# Patient Record
Sex: FEMALE | Race: BLACK OR AFRICAN AMERICAN | NOT HISPANIC OR LATINO | Employment: FULL TIME | URBAN - METROPOLITAN AREA
[De-identification: names, ages, dates, MRNs, and addresses within clinical notes are randomized per-mention and may not be internally consistent; named-entity substitution may affect disease eponyms.]

---

## 2017-01-09 RX ORDER — METFORMIN HYDROCHLORIDE 1000 MG/1
TABLET ORAL
Qty: 180 TABLET | Refills: 0 | Status: SHIPPED | OUTPATIENT
Start: 2017-01-09 | End: 2017-06-12 | Stop reason: SDUPTHER

## 2017-01-09 NOTE — TELEPHONE ENCOUNTER
Patient scheduled appt for lab and OV. Patient requesting to have CBC and CMP as well. Please advise

## 2017-02-02 ENCOUNTER — OFFICE VISIT (OUTPATIENT)
Dept: FAMILY MEDICINE | Facility: CLINIC | Age: 57
End: 2017-02-02
Payer: COMMERCIAL

## 2017-02-02 VITALS
RESPIRATION RATE: 16 BRPM | OXYGEN SATURATION: 96 % | SYSTOLIC BLOOD PRESSURE: 150 MMHG | HEIGHT: 67 IN | DIASTOLIC BLOOD PRESSURE: 84 MMHG | WEIGHT: 183 LBS | HEART RATE: 70 BPM | TEMPERATURE: 98 F | BODY MASS INDEX: 28.72 KG/M2

## 2017-02-02 DIAGNOSIS — E11.42 DIABETIC PERIPHERAL NEUROPATHY: Chronic | ICD-10-CM

## 2017-02-02 DIAGNOSIS — Z12.31 ENCOUNTER FOR SCREENING MAMMOGRAM FOR BREAST CANCER: ICD-10-CM

## 2017-02-02 DIAGNOSIS — Z00.00 WELL ADULT EXAM: Primary | ICD-10-CM

## 2017-02-02 DIAGNOSIS — E11.42 TYPE 2 DIABETES MELLITUS WITH DIABETIC POLYNEUROPATHY, WITH LONG-TERM CURRENT USE OF INSULIN: ICD-10-CM

## 2017-02-02 DIAGNOSIS — M17.0 PRIMARY OSTEOARTHRITIS OF BOTH KNEES: ICD-10-CM

## 2017-02-02 DIAGNOSIS — Z12.11 SCREEN FOR COLON CANCER: ICD-10-CM

## 2017-02-02 DIAGNOSIS — Z79.4 TYPE 2 DIABETES MELLITUS WITH DIABETIC POLYNEUROPATHY, WITH LONG-TERM CURRENT USE OF INSULIN: ICD-10-CM

## 2017-02-02 DIAGNOSIS — F51.02 INSOMNIA DUE TO STRESS: ICD-10-CM

## 2017-02-02 DIAGNOSIS — I10 ESSENTIAL HYPERTENSION: ICD-10-CM

## 2017-02-02 DIAGNOSIS — E11.3293 MILD NONPROLIFERATIVE DIABETIC RETINOPATHY OF BOTH EYES WITHOUT MACULAR EDEMA ASSOCIATED WITH TYPE 2 DIABETES MELLITUS: ICD-10-CM

## 2017-02-02 PROCEDURE — 99999 PR PBB SHADOW E&M-EST. PATIENT-LVL III: CPT | Mod: PBBFAC,,, | Performed by: FAMILY MEDICINE

## 2017-02-02 PROCEDURE — 99396 PREV VISIT EST AGE 40-64: CPT | Mod: S$GLB,,, | Performed by: FAMILY MEDICINE

## 2017-02-02 RX ORDER — INSULIN GLARGINE 300 [IU]/ML
48 INJECTION, SOLUTION SUBCUTANEOUS DAILY
Qty: 5 SYRINGE | Refills: 0 | Status: SHIPPED | OUTPATIENT
Start: 2017-02-02 | End: 2017-03-07 | Stop reason: SDUPTHER

## 2017-02-02 RX ORDER — DICLOFENAC SODIUM 75 MG/1
75 TABLET, DELAYED RELEASE ORAL 2 TIMES DAILY
Qty: 60 TABLET | Refills: 0 | Status: SHIPPED | OUTPATIENT
Start: 2017-02-02 | End: 2017-06-12

## 2017-02-02 NOTE — PROGRESS NOTES
Chief Complaint   Patient presents with    Diabetes       Cindy Billingsley is a 56 y.o. female who presents per the Chief Complaint.  Pt is known to me and was last seen by me on 9/9/2016.  All known chronic medical issues have been documented.       HPI Comments:       Diabetes   She presents for her follow-up diabetic visit. She has type 2 diabetes mellitus. Her disease course has been worsening. Hypoglycemia symptoms include headaches. Pertinent negatives for hypoglycemia include no confusion, dizziness, mood changes, nervousness/anxiousness, pallor, seizures, sleepiness, speech difficulty, sweats or tremors. Associated symptoms include blurred vision, foot paresthesias and visual change. Pertinent negatives for diabetes include no chest pain, no fatigue, no foot ulcerations, no polydipsia, no polyphagia, no polyuria, no weakness and no weight loss. There are no hypoglycemic complications. Diabetic complications include peripheral neuropathy and retinopathy. Pertinent negatives for diabetic complications include no autonomic neuropathy, CVA, heart disease, impotence, nephropathy or PVD. Risk factors for coronary artery disease include diabetes mellitus, hypertension and stress. Current diabetic treatment includes insulin injections and oral agent (monotherapy). She is compliant with treatment most of the time. Her weight is stable. She is following a generally healthy diet. She has not had a previous visit with a dietitian. She participates in exercise intermittently. An ACE inhibitor/angiotensin II receptor blocker is being taken. She does not see a podiatrist.Eye exam is current.   Hypertension   This is a chronic problem. The current episode started more than 1 year ago. The problem has been waxing and waning since onset. The problem is uncontrolled. Associated symptoms include anxiety, blurred vision, headaches and malaise/fatigue. Pertinent negatives include no chest pain, neck pain, orthopnea, palpitations,  peripheral edema, PND, shortness of breath or sweats. There are no associated agents to hypertension. Risk factors for coronary artery disease include diabetes mellitus, dyslipidemia and post-menopausal state. Past treatments include angiotensin blockers and diuretics. The current treatment provides moderate improvement. Compliance problems include psychosocial issues.  Hypertensive end-organ damage includes retinopathy. There is no history of angina, kidney disease, CAD/MI, CVA, heart failure, left ventricular hypertrophy, PVD or renovascular disease. There is no history of chronic renal disease, coarctation of the aorta, hyperparathyroidism, a hypertension causing med or sleep apnea.   Knee Pain    The incident occurred more than 1 week ago. There was no injury mechanism. The pain is present in the right knee and left knee. The quality of the pain is described as aching. The pain is at a severity of 6/10. The pain is moderate. The pain has been worsening since onset. Pertinent negatives include no inability to bear weight, loss of motion, loss of sensation, muscle weakness, numbness or tingling. She reports no foreign bodies present. The symptoms are aggravated by movement. She has tried NSAIDs for the symptoms. The treatment provided moderate relief.        ROS  Review of Systems   Constitutional: Positive for malaise/fatigue. Negative for activity change, appetite change, chills, diaphoresis, fatigue, fever, unexpected weight change and weight loss.   HENT: Negative for congestion, ear pain, hearing loss, nosebleeds, postnasal drip, rhinorrhea, sinus pressure, sneezing, sore throat and trouble swallowing.    Eyes: Positive for blurred vision. Negative for pain and visual disturbance.   Respiratory: Negative for cough, choking, chest tightness and shortness of breath.    Cardiovascular: Negative for chest pain, palpitations, orthopnea, leg swelling and PND.   Gastrointestinal: Negative for abdominal pain,  "constipation, diarrhea, nausea and vomiting.   Endocrine: Negative.  Negative for polydipsia, polyphagia and polyuria.   Genitourinary: Positive for flank pain (left sided). Negative for difficulty urinating, dysuria, frequency, impotence, menstrual problem, pelvic pain and urgency.   Musculoskeletal: Positive for arthralgias (left knee). Negative for back pain, gait problem, joint swelling, myalgias, neck pain and neck stiffness.   Skin: Negative.  Negative for pallor and rash.   Allergic/Immunologic: Negative.  Negative for environmental allergies, food allergies and immunocompromised state.   Neurological: Positive for headaches. Negative for dizziness, tingling, tremors, seizures, syncope, speech difficulty, weakness, light-headedness and numbness.   Hematological: Negative.    Psychiatric/Behavioral: Negative.  Negative for confusion, decreased concentration, dysphoric mood and sleep disturbance. The patient is not nervous/anxious.        Physical Exam  Vitals:    02/02/17 0900   BP: (!) 150/84   Pulse: 70   Resp: 16   Temp: 97.8 °F (36.6 °C)    Body mass index is 28.66 kg/(m^2).  Weight: 83 kg (182 lb 15.7 oz)   Height: 5' 7" (170.2 cm)     Physical Exam   Constitutional: She is oriented to person, place, and time. She appears well-developed and well-nourished. She is active and cooperative.  Non-toxic appearance. She does not have a sickly appearance. She does not appear ill. No distress.   HENT:   Head: Normocephalic and atraumatic.   Right Ear: Hearing and external ear normal. No decreased hearing is noted.   Left Ear: Hearing and external ear normal. No decreased hearing is noted.   Nose: Nose normal. No rhinorrhea or nasal deformity.   Mouth/Throat: Uvula is midline and oropharynx is clear and moist. She does not have dentures. Normal dentition.   Eyes: Conjunctivae, EOM and lids are normal. Pupils are equal, round, and reactive to light. Right eye exhibits no chemosis, no discharge and no exudate. No " foreign body present in the right eye. Left eye exhibits no chemosis, no discharge and no exudate. No foreign body present in the left eye. No scleral icterus.   Neck: Normal range of motion and full passive range of motion without pain. Neck supple.   Cardiovascular: Normal rate, regular rhythm, S1 normal, S2 normal and normal heart sounds.  Exam reveals no gallop and no friction rub.    No murmur heard.  Pulmonary/Chest: Effort normal and breath sounds normal. No accessory muscle usage. No respiratory distress. She has no decreased breath sounds. She has no wheezes. She has no rhonchi. She has no rales.   Abdominal: Soft. Normal appearance. She exhibits no distension. There is no hepatosplenomegaly. There is no tenderness. There is no rigidity, no rebound and no guarding.       Musculoskeletal: Normal range of motion.        Right knee: She exhibits abnormal alignment and bony tenderness. She exhibits normal range of motion, no swelling, no effusion, no ecchymosis, no deformity, no laceration, no erythema, no LCL laxity, normal patellar mobility, normal meniscus and no MCL laxity. Tenderness found. Medial joint line and lateral joint line tenderness noted. No MCL, no LCL and no patellar tendon tenderness noted.        Left knee: She exhibits bony tenderness. She exhibits normal range of motion, no swelling, no effusion, no ecchymosis, no deformity, no laceration, no erythema, normal alignment, no LCL laxity, normal meniscus and no MCL laxity. Tenderness found. Medial joint line and lateral joint line tenderness noted. No MCL, no LCL and no patellar tendon tenderness noted.   Neurological: She is alert and oriented to person, place, and time. She has normal strength. No cranial nerve deficit or sensory deficit. She exhibits normal muscle tone. She displays no seizure activity. Coordination and gait normal.   Skin: Skin is warm, dry and intact. No rash noted. She is not diaphoretic.   Psychiatric: She has a normal  mood and affect. Her speech is normal and behavior is normal. Judgment and thought content normal. Cognition and memory are normal. She is attentive.       Assessment & Plan    1. Well adult exam  Discussed age appropriate screenings at this visit and encouraged a healthy diet with low saturated fats, and increased physical activity.  Screening test will be ordered and once completed, patient will be notified of results when available.  If necessary, will follow up to discuss and manage further.     - CBC auto differential; Future  - Comprehensive metabolic panel; Future  - Lipid panel; Future  - TSH; Future  - T4, free; Future    2. Type 2 diabetes mellitus with diabetic polyneuropathy, with long-term current use of insulin  Patient is encouraged to continue a diet low in carbohydrates and simple sugars.  Advised to begin or continue a weight loss program which focuses on good food choices and increased physical activity and encouraged to adhere to medication regimen and check glucose as recommended daily and report any changes in usual trends.  Screening blood test is due at this time.   - Hemoglobin A1c; Future  - insulin glargine, TOUJEO, (TOUJEO SOLOSTAR) 300 unit/mL (1.5 mL) InPn pen; Inject 48 Units into the skin once daily. Pt has coupon card.  Dispense: 5 Syringe; Refill: 0    3. Essential hypertension  Patient was counseled and encouraged to maintain a low sodium diet, as well as increasing physical activity.  Recommend random BP checks at home on a regular basis.  Will continue medication at this time, and follow up as recommended, or sooner if blood pressure is not well controlled.     4. Mild nonproliferative diabetic retinopathy of both eyes without macular edema associated with type 2 diabetes mellitus  Stable; no therapeutic changes at this time.  Managed by Ophthalmology.    5. Diabetic peripheral neuropathy  Stable; no therapeutic changes at this time.     6. Primary osteoarthritis of both  knees  Patient is advised that arthritis is a result of regular daily use, and is caused by inflammation in the joint.  Patient was encouraged to exercise as tolerated, and attempt weight loss with sensible diet and lifestyle changes.  Patient was recommended to continue NSAID therapy to reduce inflammation, and to incorporate stretching into a daily routine.  Medication changed due to side effects.  Patient should follow up if pain is not well controlled.   - diclofenac (VOLTAREN) 75 MG EC tablet; Take 1 tablet (75 mg total) by mouth 2 (two) times daily.  Dispense: 60 tablet; Refill: 0    7. Screen for colon cancer  Patient is due for colonoscopy.  Order placed in [facility] and patient will be contacted to schedule appointment.  I will notify patient of results once available.    - Case request GI: COLONOSCOPY    8. Encounter for screening mammogram for breast cancer  Patient is due for her annual mammogram.  Order placed in [facility] and patient will be notified of results once available.   - Mammo Digital Screening Bilat with CAD; Future    Follow up documented    ACTIVE MEDICAL ISSUES:  Documented in Problem List    PAST MEDICAL HISTORY  Documented    PAST SURGICAL HISTORY:  Documented    SOCIAL HISTORY:  Documented    FAMILY HISTORY:  Documented    ALLERGIES AND MEDICATIONS: updated and reviewed.  Documented    Health Maintenance       Date Due Completion Date    Influenza Vaccine 8/1/2016 10/30/2015 (Done)    Override on 10/30/2015: Done    Override on 6/26/2015: Declined (not at this present time)    Override on 10/16/2014: Done    Hemoglobin A1c 12/22/2016 9/22/2016    Override on 6/26/2015: Done (will do future ,  appoint 06/29/15)    Eye Exam 4/1/2017 4/1/2016 (Done)    Override on 4/1/2016: Done (Dr Brown)    Override on 6/26/2015: Declined ( she will go until 08/2015)    Override on 6/24/2014: Declined (had one done)    Lipid Panel 6/7/2017 6/7/2016    Override on 1/12/2015: Done (future)    Foot  Exam 8/2/2017 8/2/2016    Override on 6/26/2015: Done (today)    Mammogram 4/12/2018 4/12/2016    Pap Smear 2/28/2019 2/29/2016    TETANUS VACCINE 8/1/2020 8/1/2010    Colonoscopy 1/24/2022 1/24/2012    Pneumococcal PPSV23 (Medium Risk) (2) 12/15/2024 8/2/2006

## 2017-02-03 ENCOUNTER — LAB VISIT (OUTPATIENT)
Dept: LAB | Facility: HOSPITAL | Age: 57
End: 2017-02-03
Attending: FAMILY MEDICINE
Payer: COMMERCIAL

## 2017-02-03 DIAGNOSIS — E11.42 TYPE 2 DIABETES MELLITUS WITH DIABETIC POLYNEUROPATHY, WITH LONG-TERM CURRENT USE OF INSULIN: ICD-10-CM

## 2017-02-03 DIAGNOSIS — Z79.4 TYPE 2 DIABETES MELLITUS WITH DIABETIC POLYNEUROPATHY, WITH LONG-TERM CURRENT USE OF INSULIN: ICD-10-CM

## 2017-02-03 DIAGNOSIS — Z00.00 WELL ADULT EXAM: ICD-10-CM

## 2017-02-03 LAB
BASOPHILS # BLD AUTO: 0.06 K/UL
BASOPHILS NFR BLD: 0.7 %
DIFFERENTIAL METHOD: NORMAL
EOSINOPHIL # BLD AUTO: 0.3 K/UL
EOSINOPHIL NFR BLD: 3.1 %
ERYTHROCYTE [DISTWIDTH] IN BLOOD BY AUTOMATED COUNT: 12.4 %
HCT VFR BLD AUTO: 38.9 %
HGB BLD-MCNC: 12.9 G/DL
LYMPHOCYTES # BLD AUTO: 3.8 K/UL
LYMPHOCYTES NFR BLD: 44.8 %
MCH RBC QN AUTO: 27.7 PG
MCHC RBC AUTO-ENTMCNC: 33.2 %
MCV RBC AUTO: 84 FL
MONOCYTES # BLD AUTO: 0.4 K/UL
MONOCYTES NFR BLD: 4.4 %
NEUTROPHILS # BLD AUTO: 4 K/UL
NEUTROPHILS NFR BLD: 46.8 %
PLATELET # BLD AUTO: 281 K/UL
PMV BLD AUTO: 11.6 FL
RBC # BLD AUTO: 4.66 M/UL
WBC # BLD AUTO: 8.46 K/UL

## 2017-02-03 PROCEDURE — 80061 LIPID PANEL: CPT

## 2017-02-03 PROCEDURE — 80053 COMPREHEN METABOLIC PANEL: CPT

## 2017-02-03 PROCEDURE — 83036 HEMOGLOBIN GLYCOSYLATED A1C: CPT

## 2017-02-03 PROCEDURE — 84439 ASSAY OF FREE THYROXINE: CPT

## 2017-02-03 PROCEDURE — 85025 COMPLETE CBC W/AUTO DIFF WBC: CPT

## 2017-02-03 PROCEDURE — 84443 ASSAY THYROID STIM HORMONE: CPT

## 2017-02-03 PROCEDURE — 36415 COLL VENOUS BLD VENIPUNCTURE: CPT | Mod: PO

## 2017-02-04 LAB
ALBUMIN SERPL BCP-MCNC: 3.5 G/DL
ALP SERPL-CCNC: 115 U/L
ALT SERPL W/O P-5'-P-CCNC: 17 U/L
ANION GAP SERPL CALC-SCNC: 13 MMOL/L
AST SERPL-CCNC: 19 U/L
BILIRUB SERPL-MCNC: 0.4 MG/DL
BUN SERPL-MCNC: 18 MG/DL
CALCIUM SERPL-MCNC: 9.2 MG/DL
CHLORIDE SERPL-SCNC: 104 MMOL/L
CHOLEST/HDLC SERPL: 5 {RATIO}
CO2 SERPL-SCNC: 21 MMOL/L
CREAT SERPL-MCNC: 1 MG/DL
EST. GFR  (AFRICAN AMERICAN): >60 ML/MIN/1.73 M^2
EST. GFR  (NON AFRICAN AMERICAN): >60 ML/MIN/1.73 M^2
ESTIMATED AVG GLUCOSE: 192 MG/DL
GLUCOSE SERPL-MCNC: 225 MG/DL
HBA1C MFR BLD HPLC: 8.3 %
HDL/CHOLESTEROL RATIO: 20.1 %
HDLC SERPL-MCNC: 174 MG/DL
HDLC SERPL-MCNC: 35 MG/DL
LDLC SERPL CALC-MCNC: 96 MG/DL
NONHDLC SERPL-MCNC: 139 MG/DL
POTASSIUM SERPL-SCNC: 4.4 MMOL/L
PROT SERPL-MCNC: 7.3 G/DL
SODIUM SERPL-SCNC: 138 MMOL/L
T4 FREE SERPL-MCNC: 0.85 NG/DL
TRIGL SERPL-MCNC: 215 MG/DL
TSH SERPL DL<=0.005 MIU/L-ACNC: 2.01 UIU/ML

## 2017-02-07 RX ORDER — ZOLPIDEM TARTRATE 5 MG/1
TABLET ORAL
Qty: 30 TABLET | Refills: 1 | Status: SHIPPED | OUTPATIENT
Start: 2017-02-07 | End: 2017-04-25 | Stop reason: SDUPTHER

## 2017-03-06 RX ORDER — VALSARTAN AND HYDROCHLOROTHIAZIDE 320; 25 MG/1; MG/1
TABLET, FILM COATED ORAL
Qty: 90 TABLET | Refills: 0 | Status: SHIPPED | OUTPATIENT
Start: 2017-03-06 | End: 2017-06-12 | Stop reason: SDUPTHER

## 2017-03-07 ENCOUNTER — TELEPHONE (OUTPATIENT)
Dept: FAMILY MEDICINE | Facility: CLINIC | Age: 57
End: 2017-03-07

## 2017-03-07 DIAGNOSIS — Z79.4 TYPE 2 DIABETES MELLITUS WITH DIABETIC POLYNEUROPATHY, WITH LONG-TERM CURRENT USE OF INSULIN: ICD-10-CM

## 2017-03-07 DIAGNOSIS — E11.42 TYPE 2 DIABETES MELLITUS WITH DIABETIC POLYNEUROPATHY, WITH LONG-TERM CURRENT USE OF INSULIN: ICD-10-CM

## 2017-03-07 NOTE — TELEPHONE ENCOUNTER
Patient informed of refill approval - valsartan/hctz and need for office visit. Patient verbalized understanding, scheduled appointment for 3/9/2017.

## 2017-03-08 RX ORDER — INSULIN GLARGINE 300 U/ML
INJECTION, SOLUTION SUBCUTANEOUS
Qty: 6 ML | Refills: 0 | Status: SHIPPED | OUTPATIENT
Start: 2017-03-08 | End: 2017-05-01 | Stop reason: SDUPTHER

## 2017-03-09 ENCOUNTER — OFFICE VISIT (OUTPATIENT)
Dept: FAMILY MEDICINE | Facility: CLINIC | Age: 57
End: 2017-03-09
Payer: COMMERCIAL

## 2017-03-09 VITALS
SYSTOLIC BLOOD PRESSURE: 146 MMHG | TEMPERATURE: 98 F | BODY MASS INDEX: 28.55 KG/M2 | OXYGEN SATURATION: 96 % | WEIGHT: 181.88 LBS | HEIGHT: 67 IN | RESPIRATION RATE: 16 BRPM | DIASTOLIC BLOOD PRESSURE: 78 MMHG | HEART RATE: 72 BPM

## 2017-03-09 DIAGNOSIS — Z79.4 TYPE 2 DIABETES MELLITUS WITH DIABETIC POLYNEUROPATHY, WITH LONG-TERM CURRENT USE OF INSULIN: Primary | ICD-10-CM

## 2017-03-09 DIAGNOSIS — F41.1 ANXIETY AS ACUTE REACTION TO EXCEPTIONAL STRESS: ICD-10-CM

## 2017-03-09 DIAGNOSIS — I10 ESSENTIAL HYPERTENSION: ICD-10-CM

## 2017-03-09 DIAGNOSIS — F43.0 ANXIETY AS ACUTE REACTION TO EXCEPTIONAL STRESS: ICD-10-CM

## 2017-03-09 DIAGNOSIS — E11.42 TYPE 2 DIABETES MELLITUS WITH DIABETIC POLYNEUROPATHY, WITH LONG-TERM CURRENT USE OF INSULIN: Primary | ICD-10-CM

## 2017-03-09 DIAGNOSIS — E78.3 HYPERCHYLOMICRONEMIA: ICD-10-CM

## 2017-03-09 DIAGNOSIS — M17.0 PRIMARY OSTEOARTHRITIS OF BOTH KNEES: ICD-10-CM

## 2017-03-09 PROCEDURE — 99214 OFFICE O/P EST MOD 30 MIN: CPT | Mod: S$GLB,,, | Performed by: FAMILY MEDICINE

## 2017-03-09 PROCEDURE — 99999 PR PBB SHADOW E&M-EST. PATIENT-LVL III: CPT | Mod: PBBFAC,,, | Performed by: FAMILY MEDICINE

## 2017-03-09 RX ORDER — BUSPIRONE HYDROCHLORIDE 15 MG/1
15 TABLET ORAL 2 TIMES DAILY
Qty: 60 TABLET | Refills: 2 | Status: SHIPPED | OUTPATIENT
Start: 2017-03-09 | End: 2017-10-18

## 2017-03-09 NOTE — PROGRESS NOTES
Chief Complaint   Patient presents with    Hypertension    Abdominal Pain       Cindy Billingsley is a 56 y.o. female who presents per the Chief Complaint.  Pt is known to me and was last seen by me on 2/2/2017.  All known chronic medical issues have been documented.       HPI Comments:       Hypertension   This is a chronic problem. The current episode started more than 1 year ago. The problem has been waxing and waning since onset. The problem is uncontrolled. Associated symptoms include anxiety, blurred vision, headaches and malaise/fatigue. Pertinent negatives include no chest pain, neck pain, orthopnea, palpitations, peripheral edema, PND, shortness of breath or sweats. There are no associated agents to hypertension. Risk factors for coronary artery disease include diabetes mellitus, dyslipidemia and post-menopausal state. Past treatments include angiotensin blockers and diuretics. The current treatment provides moderate improvement. Compliance problems include psychosocial issues.  Hypertensive end-organ damage includes retinopathy. There is no history of angina, kidney disease, CAD/MI, CVA, heart failure, left ventricular hypertrophy, PVD, renovascular disease or a thyroid problem. There is no history of chronic renal disease, coarctation of the aorta, hyperaldosteronism, hypercortisolism, hyperparathyroidism, a hypertension causing med, pheochromocytoma or sleep apnea.   Abdominal Pain   Associated symptoms include arthralgias (left knee) and headaches. Pertinent negatives include no constipation, diarrhea, dysuria, fever, frequency, myalgias, nausea, vomiting or weight loss.   Diabetes   She presents for her follow-up diabetic visit. She has type 2 diabetes mellitus. Her disease course has been worsening. Hypoglycemia symptoms include headaches and nervousness/anxiousness. Pertinent negatives for hypoglycemia include no confusion, dizziness, mood changes, pallor, seizures, sleepiness, speech difficulty,  sweats or tremors. Associated symptoms include blurred vision, foot paresthesias and visual change. Pertinent negatives for diabetes include no chest pain, no fatigue, no foot ulcerations, no polydipsia, no polyphagia, no polyuria, no weakness and no weight loss. There are no hypoglycemic complications. Diabetic complications include peripheral neuropathy and retinopathy. Pertinent negatives for diabetic complications include no autonomic neuropathy, CVA, heart disease, impotence, nephropathy or PVD. Risk factors for coronary artery disease include diabetes mellitus, hypertension and stress. Current diabetic treatment includes insulin injections and oral agent (monotherapy). She is compliant with treatment most of the time. Her weight is stable. She is following a generally healthy diet. She has not had a previous visit with a dietitian. She participates in exercise intermittently. An ACE inhibitor/angiotensin II receptor blocker is being taken. She does not see a podiatrist.Eye exam is current.   Knee Pain    The incident occurred more than 1 week ago. There was no injury mechanism. The pain is present in the right knee and left knee. The quality of the pain is described as aching. The pain is at a severity of 6/10. The pain is moderate. The pain has been worsening since onset. Pertinent negatives include no inability to bear weight, loss of motion, loss of sensation, muscle weakness, numbness or tingling. She reports no foreign bodies present. The symptoms are aggravated by movement. She has tried NSAIDs for the symptoms. The treatment provided moderate relief.        ROS  Review of Systems   Constitutional: Positive for malaise/fatigue. Negative for activity change, appetite change, chills, diaphoresis, fatigue, fever, unexpected weight change and weight loss.   HENT: Negative for congestion, ear pain, hearing loss, nosebleeds, postnasal drip, rhinorrhea, sinus pressure, sneezing, sore throat and trouble  "swallowing.    Eyes: Positive for blurred vision. Negative for pain and visual disturbance.   Respiratory: Negative for cough, choking and shortness of breath.    Cardiovascular: Negative for chest pain, palpitations, orthopnea, leg swelling and PND.   Gastrointestinal: Positive for abdominal pain. Negative for constipation, diarrhea, nausea and vomiting.   Endocrine: Negative for polydipsia, polyphagia and polyuria.   Genitourinary: Negative for difficulty urinating, dysuria, frequency, impotence and urgency.   Musculoskeletal: Positive for arthralgias (left knee). Negative for back pain, gait problem, joint swelling, myalgias and neck pain.   Skin: Negative.  Negative for pallor.   Allergic/Immunologic: Negative for environmental allergies and food allergies.   Neurological: Positive for headaches. Negative for dizziness, tingling, tremors, seizures, syncope, speech difficulty, weakness, light-headedness and numbness.   Psychiatric/Behavioral: Positive for dysphoric mood and sleep disturbance. Negative for agitation, behavioral problems, confusion, decreased concentration, hallucinations, self-injury and suicidal ideas. The patient is nervous/anxious. The patient is not hyperactive.        Physical Exam  Vitals:    03/09/17 0918   BP: (!) 146/78   Pulse: 72   Resp: 16   Temp: 98.1 °F (36.7 °C)    Body mass index is 28.49 kg/(m^2).  Weight: 82.5 kg (181 lb 14.1 oz)   Height: 5' 7" (170.2 cm)     Physical Exam   Constitutional: She is oriented to person, place, and time. She appears well-developed and well-nourished. She is active and cooperative.  Non-toxic appearance. She does not have a sickly appearance. She does not appear ill. No distress.   HENT:   Head: Normocephalic and atraumatic.   Right Ear: Hearing and external ear normal. No decreased hearing is noted.   Left Ear: Hearing and external ear normal. No decreased hearing is noted.   Nose: Nose normal. No rhinorrhea or nasal deformity.   Mouth/Throat: Uvula " is midline and oropharynx is clear and moist. She does not have dentures. Normal dentition.   Eyes: Conjunctivae, EOM and lids are normal. Pupils are equal, round, and reactive to light. Right eye exhibits no chemosis, no discharge and no exudate. No foreign body present in the right eye. Left eye exhibits no chemosis, no discharge and no exudate. No foreign body present in the left eye. No scleral icterus.   Neck: Normal range of motion and full passive range of motion without pain. Neck supple.   Cardiovascular: Normal rate, regular rhythm, S1 normal, S2 normal and normal heart sounds.  Exam reveals no gallop and no friction rub.    No murmur heard.  Pulmonary/Chest: Effort normal and breath sounds normal. No accessory muscle usage. No respiratory distress. She has no decreased breath sounds. She has no wheezes. She has no rhonchi. She has no rales.   Abdominal: Soft. Normal appearance and bowel sounds are normal. She exhibits no distension. There is no hepatosplenomegaly. There is no tenderness. There is no rigidity, no rebound and no guarding.   Musculoskeletal: Normal range of motion.        Right knee: She exhibits abnormal alignment and bony tenderness. She exhibits normal range of motion, no swelling, no effusion, no ecchymosis, no deformity, no laceration, no erythema, no LCL laxity, normal patellar mobility, normal meniscus and no MCL laxity. Tenderness found. Medial joint line and lateral joint line tenderness noted. No MCL, no LCL and no patellar tendon tenderness noted.        Left knee: She exhibits bony tenderness. She exhibits normal range of motion, no swelling, no effusion, no ecchymosis, no deformity, no laceration, no erythema, normal alignment, no LCL laxity, normal meniscus and no MCL laxity. Tenderness found. Medial joint line and lateral joint line tenderness noted. No MCL, no LCL and no patellar tendon tenderness noted.   Neurological: She is alert and oriented to person, place, and time.  She has normal strength. No cranial nerve deficit or sensory deficit. She exhibits normal muscle tone. She displays no seizure activity. Coordination and gait normal.   Skin: Skin is warm, dry and intact. No rash noted. She is not diaphoretic.   Psychiatric: She has a normal mood and affect. Her speech is normal and behavior is normal. Judgment and thought content normal. Cognition and memory are normal. She is attentive.       Assessment & Plan    1. Type 2 diabetes mellitus with diabetic polyneuropathy, with long-term current use of insulin  Patient is encouraged to continue a diet low in carbohydrates and simple sugars.  Advised to begin or continue a weight loss program which focuses on good food choices and increased physical activity and encouraged to adhere to medication regimen and check glucose as recommended daily and report any changes in usual trends.  Screening blood test is not due at this time.  Will restart Victoza which was most effective in the past but not covered by insurance; if glucose levels begin to drop, will decrease and possibly discontinue Toujeo.  - liraglutide 0.6 mg/0.1 mL, 18 mg/3 mL, subq PNIJ 0.6 mg/0.1 mL (18 mg/3 mL) PnIj; Inject 1.2 mg into the skin once daily.  Dispense: 3 mL; Refill: 5    2. Essential hypertension  Patient was counseled and encouraged to maintain a low sodium diet, as well as increasing physical activity.  Recommend random BP checks at home on a regular basis.  Will continue medication at this time, and follow up as recommended, or sooner if blood pressure is not well controlled.     3. Anxiety as acute reaction to exceptional stress  Discussed current issues that are contributing to anxiety.   We also discussed treatment options, including behavior modifications, alternative therapies, and traditional medications.  At this time, patient is interested in medication and is encouraged to follow up as needed.    - busPIRone (BUSPAR) 15 MG tablet; Take 1 tablet (15  mg total) by mouth 2 (two) times daily.  Dispense: 60 tablet; Refill: 2    4. Hyperchylomicronemia  We discussed ways to manage cholesterol levels, including increasing whole grain foods and decreasing fried and fatty foods.  I also recommended OTC Omega 3 and Omega 6 supplements to improve overall cholesterol levels.  Will continue current therapy at this visit; patient is not due for screening blood test at this time.     5. Primary osteoarthritis of both knees  The current medical regimen is effective at this time and no changes to present plan or medications will be made at this visit.         Follow up documented    ACTIVE MEDICAL ISSUES:  Documented in Problem List    PAST MEDICAL HISTORY  Documented    PAST SURGICAL HISTORY:  Documented    SOCIAL HISTORY:  Documented    FAMILY HISTORY:  Documented    ALLERGIES AND MEDICATIONS: updated and reviewed.  Documented    Health Maintenance       Date Due Completion Date    Influenza Vaccine 8/1/2016 10/30/2015 (Done)    Override on 10/30/2015: Done    Override on 6/26/2015: Declined (not at this present time)    Override on 10/16/2014: Done    Eye Exam 4/1/2017 4/1/2016 (Done)    Override on 4/1/2016: Done (Dr Brown)    Override on 6/26/2015: Declined ( she will go until 08/2015)    Override on 6/24/2014: Declined (had one done)    Hemoglobin A1c 5/3/2017 2/3/2017    Override on 6/26/2015: Done (will do future ,  appoint 06/29/15)    Foot Exam 8/2/2017 8/2/2016    Override on 6/26/2015: Done (today)    Lipid Panel 2/3/2018 2/3/2017    Override on 1/12/2015: Done (future)    Mammogram 4/12/2018 4/12/2016    Pap Smear 2/28/2019 2/29/2016    TETANUS VACCINE 8/1/2020 8/1/2010    Colonoscopy 1/24/2022 1/24/2012    Pneumococcal PPSV23 (Medium Risk) (2) 12/15/2024 8/2/2006

## 2017-03-22 ENCOUNTER — TELEPHONE (OUTPATIENT)
Dept: FAMILY MEDICINE | Facility: CLINIC | Age: 57
End: 2017-03-22

## 2017-04-13 ENCOUNTER — HOSPITAL ENCOUNTER (OUTPATIENT)
Dept: RADIOLOGY | Facility: HOSPITAL | Age: 57
Discharge: HOME | End: 2017-04-13
Attending: FAMILY MEDICINE
Payer: COMMERCIAL

## 2017-04-13 DIAGNOSIS — Z12.31 ENCOUNTER FOR SCREENING MAMMOGRAM FOR BREAST CANCER: ICD-10-CM

## 2017-04-13 PROCEDURE — 77067 SCR MAMMO BI INCL CAD: CPT | Mod: TC

## 2017-04-13 PROCEDURE — 77067 SCR MAMMO BI INCL CAD: CPT | Mod: 26,,, | Performed by: RADIOLOGY

## 2017-04-13 PROCEDURE — 77063 BREAST TOMOSYNTHESIS BI: CPT | Mod: 26,,, | Performed by: RADIOLOGY

## 2017-04-25 DIAGNOSIS — F51.02 INSOMNIA DUE TO STRESS: ICD-10-CM

## 2017-04-26 RX ORDER — ZOLPIDEM TARTRATE 5 MG/1
TABLET ORAL
Qty: 30 TABLET | Refills: 1 | Status: SHIPPED | OUTPATIENT
Start: 2017-04-26 | End: 2017-07-01 | Stop reason: SDUPTHER

## 2017-05-01 DIAGNOSIS — Z79.4 TYPE 2 DIABETES MELLITUS WITH DIABETIC POLYNEUROPATHY, WITH LONG-TERM CURRENT USE OF INSULIN: ICD-10-CM

## 2017-05-01 DIAGNOSIS — E11.42 TYPE 2 DIABETES MELLITUS WITH DIABETIC POLYNEUROPATHY, WITH LONG-TERM CURRENT USE OF INSULIN: ICD-10-CM

## 2017-05-01 RX ORDER — INSULIN GLARGINE 300 U/ML
INJECTION, SOLUTION SUBCUTANEOUS
Qty: 6 ML | Refills: 0 | Status: SHIPPED | OUTPATIENT
Start: 2017-05-01 | End: 2017-06-01 | Stop reason: SDUPTHER

## 2017-05-01 RX ORDER — INSULIN GLARGINE 300 [IU]/ML
48 INJECTION, SOLUTION SUBCUTANEOUS DAILY
Qty: 15 ML | Refills: 0 | OUTPATIENT
Start: 2017-05-01

## 2017-05-01 RX ORDER — PEN NEEDLE, DIABETIC 30 GX3/16"
NEEDLE, DISPOSABLE MISCELLANEOUS
Qty: 150 EACH | Refills: 6 | Status: SHIPPED | OUTPATIENT
Start: 2017-05-01

## 2017-06-01 DIAGNOSIS — E11.42 TYPE 2 DIABETES MELLITUS WITH DIABETIC POLYNEUROPATHY, WITH LONG-TERM CURRENT USE OF INSULIN: ICD-10-CM

## 2017-06-01 DIAGNOSIS — Z79.4 TYPE 2 DIABETES MELLITUS WITH DIABETIC POLYNEUROPATHY, WITH LONG-TERM CURRENT USE OF INSULIN: ICD-10-CM

## 2017-06-01 RX ORDER — INSULIN GLARGINE 300 U/ML
INJECTION, SOLUTION SUBCUTANEOUS
Qty: 6 ML | Refills: 0 | Status: SHIPPED | OUTPATIENT
Start: 2017-06-01 | End: 2017-07-01 | Stop reason: SDUPTHER

## 2017-06-07 ENCOUNTER — PATIENT MESSAGE (OUTPATIENT)
Dept: ADMINISTRATIVE | Facility: HOSPITAL | Age: 57
End: 2017-06-07

## 2017-06-12 ENCOUNTER — OFFICE VISIT (OUTPATIENT)
Dept: FAMILY MEDICINE | Facility: CLINIC | Age: 57
End: 2017-06-12
Payer: COMMERCIAL

## 2017-06-12 ENCOUNTER — HOSPITAL ENCOUNTER (OUTPATIENT)
Dept: RADIOLOGY | Facility: HOSPITAL | Age: 57
Discharge: HOME | End: 2017-06-12
Attending: FAMILY MEDICINE
Payer: COMMERCIAL

## 2017-06-12 VITALS
DIASTOLIC BLOOD PRESSURE: 74 MMHG | SYSTOLIC BLOOD PRESSURE: 126 MMHG | TEMPERATURE: 99 F | RESPIRATION RATE: 17 BRPM | OXYGEN SATURATION: 99 % | HEART RATE: 88 BPM | BODY MASS INDEX: 27.86 KG/M2 | HEIGHT: 67 IN | WEIGHT: 177.5 LBS

## 2017-06-12 DIAGNOSIS — Z12.11 SCREEN FOR COLON CANCER: ICD-10-CM

## 2017-06-12 DIAGNOSIS — M17.0 PRIMARY OSTEOARTHRITIS OF BOTH KNEES: ICD-10-CM

## 2017-06-12 DIAGNOSIS — Z79.4 TYPE 2 DIABETES MELLITUS WITH DIABETIC POLYNEUROPATHY, WITH LONG-TERM CURRENT USE OF INSULIN: Primary | ICD-10-CM

## 2017-06-12 DIAGNOSIS — I10 ESSENTIAL HYPERTENSION: ICD-10-CM

## 2017-06-12 DIAGNOSIS — E11.42 TYPE 2 DIABETES MELLITUS WITH DIABETIC POLYNEUROPATHY, WITH LONG-TERM CURRENT USE OF INSULIN: Primary | ICD-10-CM

## 2017-06-12 PROCEDURE — 3045F PR MOST RECENT HEMOGLOBIN A1C LEVEL 7.0-9.0%: CPT | Mod: S$GLB,,, | Performed by: FAMILY MEDICINE

## 2017-06-12 PROCEDURE — 99999 PR PBB SHADOW E&M-EST. PATIENT-LVL III: CPT | Mod: PBBFAC,,, | Performed by: FAMILY MEDICINE

## 2017-06-12 PROCEDURE — 73562 X-RAY EXAM OF KNEE 3: CPT | Mod: 50,TC,PO

## 2017-06-12 PROCEDURE — 4010F ACE/ARB THERAPY RXD/TAKEN: CPT | Mod: S$GLB,,, | Performed by: FAMILY MEDICINE

## 2017-06-12 PROCEDURE — 73562 X-RAY EXAM OF KNEE 3: CPT | Mod: 26,50,, | Performed by: RADIOLOGY

## 2017-06-12 PROCEDURE — 99214 OFFICE O/P EST MOD 30 MIN: CPT | Mod: S$GLB,,, | Performed by: FAMILY MEDICINE

## 2017-06-12 RX ORDER — METFORMIN HYDROCHLORIDE 1000 MG/1
1000 TABLET ORAL 2 TIMES DAILY WITH MEALS
Qty: 180 TABLET | Refills: 2 | Status: SHIPPED | OUTPATIENT
Start: 2017-06-12 | End: 2018-03-07 | Stop reason: SDUPTHER

## 2017-06-12 RX ORDER — VALSARTAN AND HYDROCHLOROTHIAZIDE 320; 25 MG/1; MG/1
1 TABLET, FILM COATED ORAL DAILY
Qty: 90 TABLET | Refills: 1 | Status: SHIPPED | OUTPATIENT
Start: 2017-06-12 | End: 2017-12-13 | Stop reason: SDUPTHER

## 2017-06-12 RX ORDER — INSULIN LISPRO 100 [IU]/ML
INJECTION, SOLUTION INTRAVENOUS; SUBCUTANEOUS
Qty: 3 BOX | Refills: 2 | Status: SHIPPED | OUTPATIENT
Start: 2017-06-12 | End: 2018-06-26 | Stop reason: SDUPTHER

## 2017-06-12 RX ORDER — DICLOFENAC SODIUM 10 MG/G
2 GEL TOPICAL 4 TIMES DAILY
Qty: 100 G | Refills: 2 | Status: SHIPPED | OUTPATIENT
Start: 2017-06-12 | End: 2017-08-16 | Stop reason: SDUPTHER

## 2017-06-12 NOTE — PROGRESS NOTES
Chief Complaint   Patient presents with    Diabetes    Routine Foot Care    Colon Cancer Screening       Cindy Billingsley is a 56 y.o. female who presents per the Chief Complaint.  Pt is known to me and was last seen by me on 3/9/2017.  All known chronic medical issues have been documented.               Diabetes   She presents for her follow-up diabetic visit. She has type 2 diabetes mellitus. Her disease course has been worsening. Pertinent negatives for hypoglycemia include no confusion, dizziness, headaches, mood changes, nervousness/anxiousness, seizures, sleepiness, speech difficulty, sweats or tremors. Associated symptoms include blurred vision, foot paresthesias and visual change. Pertinent negatives for diabetes include no chest pain, no fatigue, no foot ulcerations, no polydipsia, no polyphagia, no polyuria and no weakness. There are no hypoglycemic complications. Diabetic complications include peripheral neuropathy and retinopathy. Pertinent negatives for diabetic complications include no autonomic neuropathy, CVA, heart disease, impotence, nephropathy or PVD. Risk factors for coronary artery disease include diabetes mellitus, hypertension and stress. Current diabetic treatment includes insulin injections and oral agent (monotherapy). She is compliant with treatment most of the time. Her weight is stable. She is following a generally healthy diet. She has not had a previous visit with a dietitian. She participates in exercise intermittently. An ACE inhibitor/angiotensin II receptor blocker is being taken. She does not see a podiatrist.Eye exam is current.   Hypertension   This is a chronic problem. The current episode started more than 1 year ago. The problem has been waxing and waning since onset. The problem is uncontrolled. Associated symptoms include anxiety and blurred vision. Pertinent negatives include no chest pain, headaches, neck pain, peripheral edema, shortness of breath or sweats. There are  no associated agents to hypertension. Risk factors for coronary artery disease include diabetes mellitus, dyslipidemia and post-menopausal state. Past treatments include angiotensin blockers and diuretics. The current treatment provides moderate improvement. Compliance problems include psychosocial issues.  Hypertensive end-organ damage includes retinopathy. There is no history of angina, kidney disease, CAD/MI, CVA, heart failure, left ventricular hypertrophy, PVD, renovascular disease or a thyroid problem. There is no history of chronic renal disease, coarctation of the aorta, hyperaldosteronism, hypercortisolism, hyperparathyroidism, a hypertension causing med, pheochromocytoma or sleep apnea.   Knee Pain    The incident occurred more than 1 week ago. There was no injury mechanism. The pain is present in the right knee and left knee. The quality of the pain is described as aching. The pain is at a severity of 6/10. The pain is moderate. The pain has been worsening since onset. Pertinent negatives include no inability to bear weight, loss of motion, loss of sensation, muscle weakness, numbness or tingling. She reports no foreign bodies present. The symptoms are aggravated by movement. She has tried NSAIDs for the symptoms. The treatment provided moderate relief.        ROS  Review of Systems   Constitutional: Negative.  Negative for activity change, appetite change, chills, diaphoresis, fatigue, fever and unexpected weight change.   HENT: Negative.  Negative for congestion, ear pain, hearing loss, nosebleeds, postnasal drip, rhinorrhea, sinus pressure, sneezing, sore throat and trouble swallowing.    Eyes: Positive for blurred vision. Negative for pain and visual disturbance.   Respiratory: Negative for cough, choking, shortness of breath, wheezing and stridor.    Cardiovascular: Negative for chest pain and leg swelling.   Gastrointestinal: Negative for abdominal pain, constipation, diarrhea, nausea and vomiting.  "  Endocrine: Negative for polydipsia, polyphagia and polyuria.   Genitourinary: Negative for difficulty urinating, dysuria, frequency, impotence and urgency.   Musculoskeletal: Positive for arthralgias (bilateral knee pain; left worse than right) and gait problem. Negative for back pain, joint swelling, myalgias and neck pain.   Skin: Negative.    Allergic/Immunologic: Negative for environmental allergies and food allergies.   Neurological: Negative for dizziness, tingling, tremors, seizures, syncope, speech difficulty, weakness, light-headedness, numbness and headaches.   Psychiatric/Behavioral: Negative.  Negative for confusion, decreased concentration, dysphoric mood and sleep disturbance. The patient is not nervous/anxious.        Physical Exam  Vitals:    06/12/17 1358   BP: 126/74   Pulse: 88   Resp: 17   Temp: 98.5 °F (36.9 °C)    Body mass index is 27.8 kg/m².  Weight: 80.5 kg (177 lb 7.5 oz)   Height: 5' 7" (170.2 cm)     Physical Exam   Constitutional: She is oriented to person, place, and time. She appears well-developed and well-nourished. She is active and cooperative.  Non-toxic appearance. She does not have a sickly appearance. She does not appear ill. No distress.   HENT:   Head: Normocephalic and atraumatic.   Right Ear: Hearing and external ear normal. No decreased hearing is noted.   Left Ear: Hearing and external ear normal. No decreased hearing is noted.   Nose: Nose normal. No rhinorrhea or nasal deformity.   Mouth/Throat: Uvula is midline and oropharynx is clear and moist. She does not have dentures. Normal dentition.   Eyes: Conjunctivae, EOM and lids are normal. Pupils are equal, round, and reactive to light. Right eye exhibits no chemosis, no discharge and no exudate. No foreign body present in the right eye. Left eye exhibits no chemosis, no discharge and no exudate. No foreign body present in the left eye. No scleral icterus.   Neck: Normal range of motion and full passive range of motion " without pain. Neck supple.   Cardiovascular: Normal rate, regular rhythm, S1 normal, S2 normal and normal heart sounds.  Exam reveals no gallop and no friction rub.    No murmur heard.  Pulses:       Dorsalis pedis pulses are 1+ on the right side, and 1+ on the left side.   Pulmonary/Chest: Effort normal and breath sounds normal. No accessory muscle usage. No respiratory distress. She has no decreased breath sounds. She has no wheezes. She has no rhonchi. She has no rales.   Abdominal: Soft. Normal appearance. She exhibits no distension. There is no hepatosplenomegaly. There is no tenderness. There is no rigidity, no rebound and no guarding.   Musculoskeletal: Normal range of motion.        Right knee: She exhibits normal range of motion, no swelling, no effusion, no ecchymosis, no deformity, no laceration, no erythema, normal alignment, no LCL laxity, normal patellar mobility, no bony tenderness, normal meniscus and no MCL laxity. Tenderness found. No medial joint line, no lateral joint line, no MCL, no LCL and no patellar tendon tenderness noted.        Left knee: She exhibits normal range of motion, no swelling, no effusion, no ecchymosis, no deformity, no laceration, no erythema, normal alignment, no LCL laxity, normal patellar mobility, no bony tenderness, normal meniscus and no MCL laxity. Tenderness found. No medial joint line, no lateral joint line, no MCL, no LCL and no patellar tendon tenderness noted.        Legs:       Feet:    Feet:   Right Foot:   Protective Sensation: 7 sites tested. 7 sites sensed.   Skin Integrity: Negative for ulcer, blister, skin breakdown, erythema, warmth, callus or dry skin.   Left Foot:   Protective Sensation: 7 sites tested. 7 sites sensed.   Skin Integrity: Negative for ulcer, blister, skin breakdown, erythema, warmth, callus or dry skin.   Neurological: She is alert and oriented to person, place, and time. She has normal strength. No cranial nerve deficit or sensory  deficit. She exhibits normal muscle tone. She displays no seizure activity. Coordination and gait normal.   Skin: Skin is warm, dry and intact. No rash noted. She is not diaphoretic.   Psychiatric: She has a normal mood and affect. Her speech is normal and behavior is normal. Judgment and thought content normal. Cognition and memory are normal. She is attentive.       Assessment & Plan    1. Type 2 diabetes mellitus with diabetic polyneuropathy, with long-term current use of insulin  Patient is encouraged to continue a diet low in carbohydrates and simple sugars.  Advised to begin or continue a weight loss program which focuses on good food choices and increased physical activity and encouraged to adhere to medication regimen and check glucose as recommended daily and report any changes in usual trends.  Screening blood test is due at this time.  Foot exam was done at this visit.  Complete diabetic foot exam was performed at this visit.  Feet were exposed and checked for abnormality and 10 g monofilament testing was performed.  Visual inspection and pedal pulses normal and protective sensation intact.  Patient was advised to use caution with bare feet and to avoid traveling outside without feet being protected.   - Hemoglobin A1c; Future  - metformin (GLUCOPHAGE) 1000 MG tablet; Take 1 tablet (1,000 mg total) by mouth 2 (two) times daily with meals.  Dispense: 180 tablet; Refill: 2  - insulin lispro (HUMALOG KWIKPEN) 100 unit/mL InPn pen; Inject 10 units w/ each meal plus scale 150-200 +2, 201-250 +4, 251-300 +6, 301-350 +8, >350 +10.  Dispense: 3 Box; Refill: 2    2. Essential hypertension  Patient was counseled and encouraged to maintain a low sodium diet, as well as increasing physical activity.  Recommend random BP checks at home on a regular basis.  Repeat BP at end of visit was not necessary. Will continue medication at this time, and follow up in 3 months, or sooner if blood pressure is not well controlled.      - valsartan-hydrochlorothiazide (DIOVAN-HCT) 320-25 mg per tablet; Take 1 tablet by mouth once daily.  Dispense: 90 tablet; Refill: 1    3. Primary osteoarthritis of both knees  Will order imaging to further evaluate and manage accordingly.  Will start topical NSAID therapy for symptom relief.    - X-Ray Knee 3 View Bilateral; Future  - diclofenac sodium 1 % Gel; Apply 2 g topically 4 (four) times daily.  Dispense: 100 g; Refill: 2    4. Screen for colon cancer  Patient is due for colonoscopy.  Order placed in [facility] and patient will be contacted to schedule appointment.  I will notify patient of results once available.    - Case request GI: COLONOSCOPY      Follow up documented    ACTIVE MEDICAL ISSUES:  Documented in Problem List    PAST MEDICAL HISTORY  Documented    PAST SURGICAL HISTORY:  Documented    SOCIAL HISTORY:  Documented    FAMILY HISTORY:  Documented    ALLERGIES AND MEDICATIONS: updated and reviewed.  Documented    Health Maintenance       Date Due Completion Date    Eye Exam 04/01/2017 4/1/2016 (Done)    Override on 4/1/2016: Done (Dr Brown)    Override on 6/26/2015: Declined ( she will go until 08/2015)    Override on 6/24/2014: Declined (had one done)    Hemoglobin A1c 05/03/2017 2/3/2017    Override on 6/26/2015: Done (will do future ,  appoint 06/29/15)    Foot Exam 08/02/2017 8/2/2016    Override on 6/26/2015: Done (today)    Influenza Vaccine 08/01/2017 10/30/2015 (Done)    Override on 10/30/2015: Done    Override on 6/26/2015: Declined (not at this present time)    Override on 10/16/2014: Done    Lipid Panel 02/03/2018 2/3/2017    Override on 1/12/2015: Done (future)    Pap Smear with HPV Cotest 02/28/2019 2/29/2016    Mammogram 04/17/2019 4/17/2017    TETANUS VACCINE 08/01/2020 8/1/2010    Colonoscopy 01/24/2022 1/24/2012    Pneumococcal PPSV23 (Medium Risk) (2) 12/15/2024 8/2/2006

## 2017-07-01 DIAGNOSIS — F51.02 INSOMNIA DUE TO STRESS: ICD-10-CM

## 2017-07-01 DIAGNOSIS — E11.42 TYPE 2 DIABETES MELLITUS WITH DIABETIC POLYNEUROPATHY, WITH LONG-TERM CURRENT USE OF INSULIN: ICD-10-CM

## 2017-07-01 DIAGNOSIS — Z79.4 TYPE 2 DIABETES MELLITUS WITH DIABETIC POLYNEUROPATHY, WITH LONG-TERM CURRENT USE OF INSULIN: ICD-10-CM

## 2017-07-06 RX ORDER — INSULIN GLARGINE 300 U/ML
INJECTION, SOLUTION SUBCUTANEOUS
Qty: 6 SYRINGE | Refills: 0 | Status: SHIPPED | OUTPATIENT
Start: 2017-07-06 | End: 2017-08-16 | Stop reason: SDUPTHER

## 2017-07-06 RX ORDER — ZOLPIDEM TARTRATE 5 MG/1
TABLET ORAL
Qty: 30 TABLET | Refills: 0 | Status: SHIPPED | OUTPATIENT
Start: 2017-07-06 | End: 2017-08-16 | Stop reason: SDUPTHER

## 2017-07-13 DIAGNOSIS — F51.02 INSOMNIA DUE TO STRESS: ICD-10-CM

## 2017-07-16 RX ORDER — ZOLPIDEM TARTRATE 5 MG/1
5 TABLET ORAL NIGHTLY
Qty: 30 TABLET | Refills: 2 | OUTPATIENT
Start: 2017-07-16

## 2017-07-17 NOTE — TELEPHONE ENCOUNTER
Was trying to notify patient medication  Ambien was refused , no one answer , left message to call clinic .

## 2017-08-16 DIAGNOSIS — M17.0 PRIMARY OSTEOARTHRITIS OF BOTH KNEES: ICD-10-CM

## 2017-08-16 DIAGNOSIS — F51.02 INSOMNIA DUE TO STRESS: ICD-10-CM

## 2017-08-16 DIAGNOSIS — E11.42 TYPE 2 DIABETES MELLITUS WITH DIABETIC POLYNEUROPATHY, WITH LONG-TERM CURRENT USE OF INSULIN: ICD-10-CM

## 2017-08-16 DIAGNOSIS — Z79.4 TYPE 2 DIABETES MELLITUS WITH DIABETIC POLYNEUROPATHY, WITH LONG-TERM CURRENT USE OF INSULIN: ICD-10-CM

## 2017-08-17 RX ORDER — ZOLPIDEM TARTRATE 5 MG/1
5 TABLET ORAL NIGHTLY
Qty: 30 TABLET | Refills: 0 | Status: SHIPPED | OUTPATIENT
Start: 2017-08-17 | End: 2017-10-06 | Stop reason: SDUPTHER

## 2017-08-17 RX ORDER — INSULIN GLARGINE 300 [IU]/ML
INJECTION, SOLUTION SUBCUTANEOUS
Qty: 6 SYRINGE | Refills: 0 | Status: SHIPPED | OUTPATIENT
Start: 2017-08-17 | End: 2017-10-06 | Stop reason: SDUPTHER

## 2017-08-17 RX ORDER — DICLOFENAC SODIUM 10 MG/G
2 GEL TOPICAL 4 TIMES DAILY
Qty: 100 G | Refills: 2 | Status: SHIPPED | OUTPATIENT
Start: 2017-08-17 | End: 2018-02-26

## 2017-10-06 DIAGNOSIS — E11.42 TYPE 2 DIABETES MELLITUS WITH DIABETIC POLYNEUROPATHY, WITH LONG-TERM CURRENT USE OF INSULIN: ICD-10-CM

## 2017-10-06 DIAGNOSIS — Z79.4 TYPE 2 DIABETES MELLITUS WITH DIABETIC POLYNEUROPATHY, WITH LONG-TERM CURRENT USE OF INSULIN: ICD-10-CM

## 2017-10-06 DIAGNOSIS — F51.02 INSOMNIA DUE TO STRESS: ICD-10-CM

## 2017-10-12 RX ORDER — ZOLPIDEM TARTRATE 5 MG/1
TABLET ORAL
Qty: 30 TABLET | Refills: 0 | Status: SHIPPED | OUTPATIENT
Start: 2017-10-12 | End: 2017-10-19 | Stop reason: SDUPTHER

## 2017-10-12 RX ORDER — LIRAGLUTIDE 6 MG/ML
INJECTION SUBCUTANEOUS
Qty: 6 SYRINGE | Refills: 5 | Status: SHIPPED | OUTPATIENT
Start: 2017-10-12 | End: 2018-02-26

## 2017-10-12 RX ORDER — INSULIN GLARGINE 300 U/ML
INJECTION, SOLUTION SUBCUTANEOUS
Qty: 12 SYRINGE | Refills: 0 | Status: SHIPPED | OUTPATIENT
Start: 2017-10-12 | End: 2017-12-08 | Stop reason: SDUPTHER

## 2017-10-13 DIAGNOSIS — E11.9 TYPE 2 DIABETES MELLITUS WITHOUT COMPLICATION: ICD-10-CM

## 2017-10-18 ENCOUNTER — LAB VISIT (OUTPATIENT)
Dept: LAB | Facility: HOSPITAL | Age: 57
End: 2017-10-18
Attending: FAMILY MEDICINE
Payer: COMMERCIAL

## 2017-10-18 ENCOUNTER — OFFICE VISIT (OUTPATIENT)
Dept: FAMILY MEDICINE | Facility: CLINIC | Age: 57
End: 2017-10-18
Payer: COMMERCIAL

## 2017-10-18 VITALS
BODY MASS INDEX: 28 KG/M2 | HEIGHT: 67 IN | HEART RATE: 78 BPM | WEIGHT: 178.38 LBS | OXYGEN SATURATION: 96 % | RESPIRATION RATE: 17 BRPM | SYSTOLIC BLOOD PRESSURE: 138 MMHG | DIASTOLIC BLOOD PRESSURE: 82 MMHG | TEMPERATURE: 98 F

## 2017-10-18 DIAGNOSIS — R10.13 EPIGASTRIC PAIN: ICD-10-CM

## 2017-10-18 DIAGNOSIS — Z79.4 TYPE 2 DIABETES MELLITUS WITH DIABETIC POLYNEUROPATHY, WITH LONG-TERM CURRENT USE OF INSULIN: Primary | ICD-10-CM

## 2017-10-18 DIAGNOSIS — I10 ESSENTIAL HYPERTENSION: ICD-10-CM

## 2017-10-18 DIAGNOSIS — Z12.11 SCREEN FOR COLON CANCER: ICD-10-CM

## 2017-10-18 DIAGNOSIS — E11.42 TYPE 2 DIABETES MELLITUS WITH DIABETIC POLYNEUROPATHY, WITH LONG-TERM CURRENT USE OF INSULIN: Primary | ICD-10-CM

## 2017-10-18 DIAGNOSIS — E11.9 TYPE 2 DIABETES MELLITUS WITHOUT COMPLICATION: ICD-10-CM

## 2017-10-18 PROCEDURE — 83036 HEMOGLOBIN GLYCOSYLATED A1C: CPT

## 2017-10-18 PROCEDURE — 99999 PR PBB SHADOW E&M-EST. PATIENT-LVL III: CPT | Mod: PBBFAC,,, | Performed by: FAMILY MEDICINE

## 2017-10-18 PROCEDURE — 36415 COLL VENOUS BLD VENIPUNCTURE: CPT | Mod: PO

## 2017-10-18 PROCEDURE — 99214 OFFICE O/P EST MOD 30 MIN: CPT | Mod: S$GLB,,, | Performed by: FAMILY MEDICINE

## 2017-10-18 RX ORDER — DICYCLOMINE HYDROCHLORIDE 20 MG/1
20 TABLET ORAL EVERY 6 HOURS
Qty: 20 TABLET | Refills: 0 | Status: SHIPPED | OUTPATIENT
Start: 2017-10-18 | End: 2017-12-12

## 2017-10-18 RX ORDER — PANTOPRAZOLE SODIUM 40 MG/1
40 TABLET, DELAYED RELEASE ORAL DAILY
Qty: 30 TABLET | Refills: 1 | Status: SHIPPED | OUTPATIENT
Start: 2017-10-18 | End: 2018-01-15

## 2017-10-18 NOTE — PROGRESS NOTES
"Chief Complaint   Patient presents with    Diabetes    Abdominal Pain       Cindy Billingsley is a 56 y.o. female who presents per the Chief Complaint.  Pt is known to me and was last seen by me on 6/12/2017.  All known chronic medical issues have been documented.       HPI     ROS  Review of Systems   Gastrointestinal: Positive for abdominal pain.       Physical Exam  Vitals:    10/18/17 1530   BP: 138/82   Pulse: 78   Resp: 17   Temp: 98.2 °F (36.8 °C)    Body mass index is 27.93 kg/m².  Weight: 80.9 kg (178 lb 5.6 oz)   Height: 5' 7" (170.2 cm)     Physical Exam   Abdominal: Normal appearance and bowel sounds are normal. There is no hepatosplenomegaly or hepatomegaly. There is tenderness in the epigastric area. There is no rigidity, no rebound, no guarding, no CVA tenderness, no tenderness at McBurney's point and negative Bonilla's sign.       Assessment & Plan    1. Type 2 diabetes mellitus with diabetic polyneuropathy, with long-term current use of insulin  Patient is encouraged to continue a diet low in carbohydrates and simple sugars.  Advised to begin or continue a weight loss program which focuses on good food choices and increased physical activity and encouraged to adhere to medication regimen and check glucose as recommended daily and report any changes in usual trends.  Screening blood test is due at this time.  Foot exam was not done at this visit.     2. Essential hypertension  Patient was counseled and encouraged to maintain a low sodium diet, as well as increasing physical activity.  Recommend random BP checks at home on a regular basis.  Repeat BP at end of visit was not necessary. Will continue medication at this time, and follow up in 3 months, or sooner if blood pressure is not well controlled.       3. Epigastric pain  Possible gastritis; consider constipation.  Will treat with PPI and motility antagonist medication and monitor; will consider imaging and GI referral if necessary.  - pantoprazole " (PROTONIX) 40 MG tablet; Take 1 tablet (40 mg total) by mouth once daily.  Dispense: 30 tablet; Refill: 1  - dicyclomine (BENTYL) 20 mg tablet; Take 1 tablet (20 mg total) by mouth every 6 (six) hours.  Dispense: 20 tablet; Refill: 0    4. Screen for colon cancer  Patient is due for colonoscopy.  Order placed in [facility] and patient will be contacted to schedule appointment.  I will notify patient of results once available.    - Case request GI: COLONOSCOPY      Follow up documented    ACTIVE MEDICAL ISSUES:  Documented in Problem List    PAST MEDICAL HISTORY  Documented    PAST SURGICAL HISTORY:  Documented    SOCIAL HISTORY:  Documented    FAMILY HISTORY:  Documented    ALLERGIES AND MEDICATIONS: updated and reviewed.  Documented    Health Maintenance       Date Due Completion Date    Influenza Vaccine 08/01/2017 10/30/2015 (Done)    Override on 10/30/2015: Done    Override on 6/26/2015: Declined (not at this present time)    Override on 10/16/2014: Done    Hemoglobin A1c 09/12/2017 6/12/2017    Override on 6/26/2015: Done (will do future ,  appoint 06/29/15)    Lipid Panel 02/03/2018 2/3/2017    Override on 1/12/2015: Done (future)    Foot Exam 06/12/2018 6/12/2017    Override on 6/12/2017: Done    Override on 6/26/2015: Done (today)    Eye Exam 06/19/2018 6/19/2017 (Done)    Override on 6/19/2017: Done (Dr Enrique)    Override on 12/16/2016: Done (Dr. Royal)    Override on 4/1/2016: Done (Dr Brown)    Override on 6/26/2015: Declined ( she will go until 08/2015)    Override on 6/24/2014: Declined (had one done)    Pap Smear with HPV Cotest 02/28/2019 2/29/2016    Mammogram 04/17/2019 4/17/2017    TETANUS VACCINE 08/01/2020 8/1/2010    Colonoscopy 01/24/2022 1/24/2012    Pneumococcal PPSV23 (Medium Risk) (2) 12/15/2024 8/2/2006

## 2017-10-19 DIAGNOSIS — F51.02 INSOMNIA DUE TO STRESS: ICD-10-CM

## 2017-10-19 LAB
ESTIMATED AVG GLUCOSE: 160 MG/DL
HBA1C MFR BLD HPLC: 7.2 %

## 2017-10-19 RX ORDER — ZOLPIDEM TARTRATE 5 MG/1
5 TABLET ORAL NIGHTLY
Qty: 30 TABLET | Refills: 2 | Status: SHIPPED | OUTPATIENT
Start: 2017-10-19 | End: 2018-03-05 | Stop reason: SDUPTHER

## 2017-10-27 ENCOUNTER — TELEPHONE (OUTPATIENT)
Dept: HEPATOLOGY | Facility: CLINIC | Age: 57
End: 2017-10-27

## 2017-10-27 DIAGNOSIS — K76.0 NAFLD (NONALCOHOLIC FATTY LIVER DISEASE): Primary | ICD-10-CM

## 2017-10-27 NOTE — TELEPHONE ENCOUNTER
----- Message from Zulma Zambrano sent at 10/27/2017  9:49 AM CDT -----  Contact: pt [phone]  Pt requests to schedule a follow up visit. Pt can be reached at [phone] (home)

## 2017-10-30 ENCOUNTER — TELEPHONE (OUTPATIENT)
Dept: HEPATOLOGY | Facility: CLINIC | Age: 57
End: 2017-10-30

## 2017-10-30 NOTE — TELEPHONE ENCOUNTER
MA attempted to call patient to schedule her labs, US and follow up visit. Patient unable to reached left her VM to please give us a callback. SENIA

## 2017-11-16 ENCOUNTER — HOSPITAL ENCOUNTER (OUTPATIENT)
Facility: HOSPITAL | Age: 57
Discharge: HOME | End: 2017-11-16
Attending: INTERNAL MEDICINE | Admitting: INTERNAL MEDICINE
Payer: COMMERCIAL

## 2017-11-16 ENCOUNTER — ANESTHESIA (OUTPATIENT)
Dept: ENDOSCOPY | Facility: HOSPITAL | Age: 57
End: 2017-11-16
Payer: COMMERCIAL

## 2017-11-16 ENCOUNTER — ANESTHESIA EVENT (OUTPATIENT)
Dept: ENDOSCOPY | Facility: HOSPITAL | Age: 57
End: 2017-11-16
Payer: COMMERCIAL

## 2017-11-16 VITALS
OXYGEN SATURATION: 98 % | BODY MASS INDEX: 28.77 KG/M2 | WEIGHT: 179 LBS | TEMPERATURE: 99 F | RESPIRATION RATE: 18 BRPM | HEIGHT: 66 IN | DIASTOLIC BLOOD PRESSURE: 82 MMHG | SYSTOLIC BLOOD PRESSURE: 166 MMHG | HEART RATE: 68 BPM

## 2017-11-16 DIAGNOSIS — Z12.11 COLON CANCER SCREENING: ICD-10-CM

## 2017-11-16 LAB — POCT GLUCOSE: 149 MG/DL (ref 70–110)

## 2017-11-16 PROCEDURE — 88305 TISSUE EXAM BY PATHOLOGIST: CPT

## 2017-11-16 PROCEDURE — 37000008 HC ANESTHESIA 1ST 15 MINUTES: Performed by: INTERNAL MEDICINE

## 2017-11-16 PROCEDURE — D9220A PRA ANESTHESIA: Mod: 33,CRNA,, | Performed by: NURSE ANESTHETIST, CERTIFIED REGISTERED

## 2017-11-16 PROCEDURE — D9220A PRA ANESTHESIA: Mod: 33,ANES,, | Performed by: ANESTHESIOLOGY

## 2017-11-16 PROCEDURE — 25000003 PHARM REV CODE 250: Performed by: ANESTHESIOLOGY

## 2017-11-16 PROCEDURE — 63600175 PHARM REV CODE 636 W HCPCS: Performed by: NURSE ANESTHETIST, CERTIFIED REGISTERED

## 2017-11-16 PROCEDURE — 27201089 HC SNARE, DISP (ANY): Performed by: INTERNAL MEDICINE

## 2017-11-16 PROCEDURE — 88305 TISSUE EXAM BY PATHOLOGIST: CPT | Mod: 26,,,

## 2017-11-16 PROCEDURE — 37000009 HC ANESTHESIA EA ADD 15 MINS: Performed by: INTERNAL MEDICINE

## 2017-11-16 PROCEDURE — 45385 COLONOSCOPY W/LESION REMOVAL: CPT | Performed by: INTERNAL MEDICINE

## 2017-11-16 RX ORDER — PROPOFOL 10 MG/ML
VIAL (ML) INTRAVENOUS
Status: DISCONTINUED
Start: 2017-11-16 | End: 2017-11-16 | Stop reason: HOSPADM

## 2017-11-16 RX ORDER — SODIUM CHLORIDE 9 MG/ML
INJECTION, SOLUTION INTRAVENOUS CONTINUOUS
Status: DISCONTINUED | OUTPATIENT
Start: 2017-11-16 | End: 2017-11-16 | Stop reason: HOSPADM

## 2017-11-16 RX ORDER — PROPOFOL 10 MG/ML
VIAL (ML) INTRAVENOUS
Status: DISCONTINUED | OUTPATIENT
Start: 2017-11-16 | End: 2017-11-16

## 2017-11-16 RX ORDER — LIDOCAINE HYDROCHLORIDE 20 MG/ML
INJECTION, SOLUTION EPIDURAL; INFILTRATION; INTRACAUDAL; PERINEURAL
Status: DISCONTINUED
Start: 2017-11-16 | End: 2017-11-16 | Stop reason: HOSPADM

## 2017-11-16 RX ORDER — LIDOCAINE HCL/PF 100 MG/5ML
SYRINGE (ML) INTRAVENOUS
Status: DISCONTINUED | OUTPATIENT
Start: 2017-11-16 | End: 2017-11-16

## 2017-11-16 RX ADMIN — PROPOFOL 80 MG: 10 INJECTION, EMULSION INTRAVENOUS at 10:11

## 2017-11-16 RX ADMIN — PROPOFOL 40 MG: 10 INJECTION, EMULSION INTRAVENOUS at 10:11

## 2017-11-16 RX ADMIN — SODIUM CHLORIDE: 0.9 INJECTION, SOLUTION INTRAVENOUS at 10:11

## 2017-11-16 RX ADMIN — LIDOCAINE HYDROCHLORIDE 100 MG: 20 INJECTION, SOLUTION INTRAVENOUS at 10:11

## 2017-11-16 NOTE — DISCHARGE INSTRUCTIONS
Diverticulosis    Diverticulosis means that small pouches have formed in the wall of your large intestine (colon). Most often, this problem causes no symptoms and is common as people age. But the pouches in the colon are at risk of becoming infected. When this happens, the condition is called diverticulitis. Although most people with diverticulosis never develop diverticulitis, it is still not uncommon. Rectal bleeding can also occur and in less common situations, a type of colon inflammation called colitis.  While most people do not have symptoms, some people with diverticulosis may have:  · Abdominal cramps and pain  · Bloating  · Constipation  · Change in bowel habits  Causes  The exact cause of diverticulosis (and diverticulitis) has not been proved, but a few things are associated with the condition:  · Low-fiber diet  · Constipation  · Lack of exercise  Your healthcare provider will talk with you about how to manage your condition. Diet changes may be all that are needed to help control diverticulosis and prevent progression to diverticulitis. If you develop diverticulitis, you will likely need other treatments.  Home care  You may be told to take fiber supplements daily. Fiber adds bulk to the stool so that it passes through the colon more easily. Stool softeners may be recommended. You may also be given medications for pain relief. Be sure to take all medications as directed.  In the past, people were told to avoid corn, nuts, and seeds. This is no longer necessary.  Follow these guidelines when caring for yourself at home:  · Eat unprocessed foods that are high in fiber. Whole grains, fruits, and vegetables are good choices.  · Drink 6 to 8 glasses of water every day unless your healthcare provider has you limit how much fluid you should have.  · Watch for changes in your bowel movements. Tell your provider if you notice any changes.  · Begin an exercise program. Ask your provider how to get started.  Generally, walking is the best.  · Get plenty of rest and sleep.  Follow-up care  Follow up with your healthcare provider, or as advised. Regular visits may be needed to check on your health. Sometimes special procedures such as colonoscopy, are needed after an episode of diverticulitis or blooding. Be sure to keep all your appointments.  If a stool sample was taken, or cultures were done, you should be told if they are positive, or if your treatment needs to be changed. You can call as directed for the results.  If X-rays were done, a radiologist will look at them. You will be told if there is a change in your treatment.  If antibiotics were prescribed, be sure to finish them all.  When to seek medical advice  Call your healthcare provider right away if any of these occur:  · Fever of 100.4°F (38°C) or higher, or as directed by your healthcare provider  · Severe cramps in the lower left side of the abdomen or pain that is getting worse  · Tenderness in the lower left side of the abdomen or worsening pain throughout the abdomen  · Diarrhea or constipation that doesn't get better within 24 hours  · Nausea and vomiting  · Bleeding from the rectum  Call 911  Call emergency services if any of the following occur:  · Trouble breathing  · Confusion  · Very drowsy or trouble awakening  · Fainting or loss of consciousness  · Rapid heart rate  · Chest pain  Date Last Reviewed: 12/30/2015 © 2000-2017 [employer]. [address] [place] [address]. All rights reserved. This information is not intended as a substitute for professional medical care. Always follow your healthcare professional's instructions.        High-Fiber Diet  Fiber is in fruits, vegetables, cereals, and grains. Fiber passes through your body undigested. A high-fiber diet helps food move through your intestinal tract. The added bulk is helpful in preventing constipation. In people with diverticulosis, fiber helps clean out the pouches  along the colon wall. It also prevents new pouches from forming. A high-fiber diet reduces the risk of colon cancer. It also lowers blood cholesterol and prevents high blood sugar in people with diabetes.    The fiber-rich foods listed below should be part of your diet. If you are not used to high-fiber foods, start with 1 or 2 foods from this list. Every 3 to 4 days add a new one to your diet. Do this until you are eating 4 high-fiber foods per day. This should give you 20 to 35 grams of fiber a day. It is also important to drink a lot of water when you are on this diet. You should have 6 to 8 glasses of water a day. Water makes the fiber swell and increases the benefit.  Foods high in dietary fiber  The following foods are high in dietary fiber:  · Breads. Breads made with 100% whole-wheat flour; mona, wheat, or rye crackers; whole-grain tortillas, bran muffins.  · Cereals. Whole-grain and bran cereals with bran (shredded wheat, wheat flakes, raisin bran, corn bran); oatmeal, rolled oats, granola, and brown rice.  · Fruits. Fresh fruits and their edible skins (pears, prunes, raisins, berries, apples, and apricots); bananas, citrus fruit, mangoes, pineapple; and prune juice.  · Nuts. Any nuts and seeds.  · Vegetables. Best served raw or lightly cooked. All types, especially: green peas, celery, eggplant, potatoes, spinach, broccoli, [place] sprouts, winter squash, carrots, cauliflower, soybeans, lentils, and fresh and dried beans of all kinds.  · Other. Popcorn, any spices.  Date Last Reviewed: 8/1/2016  © 8991-2523 [employer]. [address] [place] [address], [place] [address]. All rights reserved. This information is not intended as a substitute for professional medical care. Always follow your healthcare professional's instructions.        Understanding Colon and Rectal Polyps    The colon (also called the large intestine) is a muscular tube that forms the last part of the digestive tract. It absorbs  water and stores food waste. The colon is about 4 to 6 feet long. The rectum is the last 6 inches of the colon. The colon and rectum have a smooth lining composed of millions of cells. Changes in these cells can lead to growths in the colon that can become cancerous and should be removed. Multiple tests are available to screen for colon cancer, but the colonoscopy is the most recommended test. During colonoscopy, these polyps can be removed. How often you need this test depends on many things including your condition, your family history, symptoms, and what the findings were at the previous colonoscopy.   When the colon lining changes  Changes that happen in the cells that line the colon or rectum can lead to growths called polyps. Over a period of years, polyps can turn cancerous. Removing polyps early may prevent cancer from ever forming.  Polyps  Polyps are fleshy clumps of tissue that form on the lining of the colon or rectum. Small polyps are usually benign (not cancerous). However, over time, cells in a polyp can change and become cancerous. Certain types of polyps known as adenomatous polyps are premalignant. The risk for invasive cancer increases with the size of the polyp and certain cell and gene features. This means that they can become cancerous if they're not removed. Hyperplastic polyps are benign. They can grow quite large and not turn cancerous.   Cancer  Almost all colorectal cancers start when polyp cells begin growing abnormally. As a cancerous tumor grows, it may involve more and more of the colon or rectum. In time, cancer can also grow beyond the colon or rectum and spread to nearby organs or to glands called lymph nodes. The cells can also travel to other parts of the body. This is known as metastasis. The earlier a cancerous tumor is removed, the better the chance of preventing its spread.    Date Last Reviewed: 8/1/2016  © 5278-3986 [employer]. [address] [place] [address]  [place] [address]. All rights reserved. This information is not intended as a substitute for professional medical care. Always follow your healthcare professional's instructions.

## 2017-11-16 NOTE — TRANSFER OF CARE
"Anesthesia Transfer of Care Note    Patient: Cindy Billingsley    Procedure(s) Performed: Procedure(s) (LRB):  COLONOSCOPY (N/A)    Patient location: PACU    Anesthesia Type: general    Transport from OR: Transported from OR on room air with adequate spontaneous ventilation    Post pain: adequate analgesia    Post assessment: no apparent anesthetic complications    Post vital signs: stable    Level of consciousness: awake and alert    Nausea/Vomiting: no nausea/vomiting    Complications: none    Transfer of care protocol was followed      Last vitals:   Visit Vitals  /65 (BP Location: Left arm, Patient Position: Lying)   Pulse 69   Temp 37 °C (98.6 °F) (Oral)   Resp 12   Ht 5' 6" (1.676 m)   Wt 81.2 kg (179 lb)   LMP 02/01/2016 (Approximate)   SpO2 95%   Breastfeeding? No   BMI 28.89 kg/m²     "

## 2017-11-16 NOTE — ANESTHESIA PREPROCEDURE EVALUATION
11/16/2017  Cindy Billingsley is a 56 y.o., female.    Anesthesia Evaluation    I have reviewed the Patient Summary Reports.     I have reviewed the Medications.     Review of Systems  Anesthesia Hx:  No problems with previous Anesthesia    Social:  Former Smoker, Alcohol Use    Cardiovascular:   Hypertension    Hepatic/GI:   GERD Liver Disease,    Neurological:   Neuromuscular Disease,    Endocrine:   Diabetes, type 2    Psych:   anxiety depression          Physical Exam  General:  Well nourished    Airway/Jaw/Neck:  Airway Findings: Mouth Opening: Normal Tongue: Normal  General Airway Assessment: Adult  Mallampati: II  TM Distance: Normal, at least 6 cm  Jaw/Neck Findings:  Neck ROM: Normal ROM      Dental:  Dental Findings: In tact   Chest/Lungs:  Chest/Lungs Findings: Clear to auscultation, Normal Respiratory Rate     Heart/Vascular:  Heart Findings: Rate: Normal        Mental Status:  Mental Status Findings:  Cooperative, Alert and Oriented         Anesthesia Plan  Type of Anesthesia, risks & benefits discussed:  Anesthesia Type:  general  Patient's Preference:   Intra-op Monitoring Plan: standard ASA monitors  Intra-op Monitoring Plan Comments:   Post Op Pain Control Plan: multimodal analgesia, IV/PO Opioids PRN and per primary service following discharge from PACU  Post Op Pain Control Plan Comments:   Induction:   IV  Beta Blocker:  Patient is not currently on a Beta-Blocker (No further documentation required).       Informed Consent: Patient understands risks and agrees with Anesthesia plan.  Questions answered. Anesthesia consent signed with patient.  ASA Score: 3     Day of Surgery Review of History & Physical:    H&P update referred to the surgeon.         Ready For Surgery From Anesthesia Perspective.

## 2017-11-16 NOTE — H&P (VIEW-ONLY)
"Chief Complaint   Patient presents with    Diabetes    Abdominal Pain       Cindy Billingsley is a 56 y.o. female who presents per the Chief Complaint.  Pt is known to me and was last seen by me on 6/12/2017.  All known chronic medical issues have been documented.       HPI     ROS  Review of Systems   Gastrointestinal: Positive for abdominal pain.       Physical Exam  Vitals:    10/18/17 1530   BP: 138/82   Pulse: 78   Resp: 17   Temp: 98.2 °F (36.8 °C)    Body mass index is 27.93 kg/m².  Weight: 80.9 kg (178 lb 5.6 oz)   Height: 5' 7" (170.2 cm)     Physical Exam   Abdominal: Normal appearance and bowel sounds are normal. There is no hepatosplenomegaly or hepatomegaly. There is tenderness in the epigastric area. There is no rigidity, no rebound, no guarding, no CVA tenderness, no tenderness at McBurney's point and negative Bonilla's sign.       Assessment & Plan    1. Type 2 diabetes mellitus with diabetic polyneuropathy, with long-term current use of insulin  Patient is encouraged to continue a diet low in carbohydrates and simple sugars.  Advised to begin or continue a weight loss program which focuses on good food choices and increased physical activity and encouraged to adhere to medication regimen and check glucose as recommended daily and report any changes in usual trends.  Screening blood test is due at this time.  Foot exam was not done at this visit.     2. Essential hypertension  Patient was counseled and encouraged to maintain a low sodium diet, as well as increasing physical activity.  Recommend random BP checks at home on a regular basis.  Repeat BP at end of visit was not necessary. Will continue medication at this time, and follow up in 3 months, or sooner if blood pressure is not well controlled.       3. Epigastric pain  Possible gastritis; consider constipation.  Will treat with PPI and motility antagonist medication and monitor; will consider imaging and GI referral if necessary.  - pantoprazole " (PROTONIX) 40 MG tablet; Take 1 tablet (40 mg total) by mouth once daily.  Dispense: 30 tablet; Refill: 1  - dicyclomine (BENTYL) 20 mg tablet; Take 1 tablet (20 mg total) by mouth every 6 (six) hours.  Dispense: 20 tablet; Refill: 0    4. Screen for colon cancer  Patient is due for colonoscopy.  Order placed in [facility] and patient will be contacted to schedule appointment.  I will notify patient of results once available.    - Case request GI: COLONOSCOPY      Follow up documented    ACTIVE MEDICAL ISSUES:  Documented in Problem List    PAST MEDICAL HISTORY  Documented    PAST SURGICAL HISTORY:  Documented    SOCIAL HISTORY:  Documented    FAMILY HISTORY:  Documented    ALLERGIES AND MEDICATIONS: updated and reviewed.  Documented    Health Maintenance       Date Due Completion Date    Influenza Vaccine 08/01/2017 10/30/2015 (Done)    Override on 10/30/2015: Done    Override on 6/26/2015: Declined (not at this present time)    Override on 10/16/2014: Done    Hemoglobin A1c 09/12/2017 6/12/2017    Override on 6/26/2015: Done (will do future ,  appoint 06/29/15)    Lipid Panel 02/03/2018 2/3/2017    Override on 1/12/2015: Done (future)    Foot Exam 06/12/2018 6/12/2017    Override on 6/12/2017: Done    Override on 6/26/2015: Done (today)    Eye Exam 06/19/2018 6/19/2017 (Done)    Override on 6/19/2017: Done (Dr Enrique)    Override on 12/16/2016: Done (Dr. Royal)    Override on 4/1/2016: Done (Dr Brown)    Override on 6/26/2015: Declined ( she will go until 08/2015)    Override on 6/24/2014: Declined (had one done)    Pap Smear with HPV Cotest 02/28/2019 2/29/2016    Mammogram 04/17/2019 4/17/2017    TETANUS VACCINE 08/01/2020 8/1/2010    Colonoscopy 01/24/2022 1/24/2012    Pneumococcal PPSV23 (Medium Risk) (2) 12/15/2024 8/2/2006

## 2017-11-17 ENCOUNTER — OFFICE VISIT (OUTPATIENT)
Dept: FAMILY MEDICINE | Facility: CLINIC | Age: 57
End: 2017-11-17
Payer: COMMERCIAL

## 2017-11-17 VITALS
RESPIRATION RATE: 17 BRPM | DIASTOLIC BLOOD PRESSURE: 82 MMHG | HEART RATE: 82 BPM | BODY MASS INDEX: 28.38 KG/M2 | OXYGEN SATURATION: 95 % | SYSTOLIC BLOOD PRESSURE: 138 MMHG | HEIGHT: 66 IN | WEIGHT: 176.56 LBS | TEMPERATURE: 99 F

## 2017-11-17 DIAGNOSIS — I10 ESSENTIAL HYPERTENSION: Primary | ICD-10-CM

## 2017-11-17 PROCEDURE — 99213 OFFICE O/P EST LOW 20 MIN: CPT | Mod: S$GLB,,, | Performed by: FAMILY MEDICINE

## 2017-11-17 PROCEDURE — 99999 PR PBB SHADOW E&M-EST. PATIENT-LVL III: CPT | Mod: PBBFAC,,, | Performed by: FAMILY MEDICINE

## 2017-11-17 NOTE — ANESTHESIA POSTPROCEDURE EVALUATION
"Anesthesia Post Evaluation    Patient: Cindy Billingsley    Procedure(s) Performed: Procedure(s) (LRB):  COLONOSCOPY (N/A)    Final Anesthesia Type: general  Patient location during evaluation: GI PACU  Patient participation: Yes- Able to Participate  Level of consciousness: awake and alert, awake and oriented  Post-procedure vital signs: reviewed and stable  Pain management: adequate  Airway patency: patent  PONV status at discharge: No PONV  Anesthetic complications: no      Cardiovascular status: blood pressure returned to baseline and hemodynamically stable  Respiratory status: unassisted, spontaneous ventilation and room air  Hydration status: euvolemic  Follow-up not needed.        Visit Vitals  BP (!) 166/82 (BP Location: Left arm, Patient Position: Lying)   Pulse 68   Temp 37 °C (98.6 °F) (Oral)   Resp 18   Ht 5' 6" (1.676 m)   Wt 81.2 kg (179 lb)   LMP 02/01/2016 (Approximate)   SpO2 98%   Breastfeeding? No   BMI 28.89 kg/m²       Pain/Janet Score: Pain Assessment Performed: Yes (11/16/2017 11:29 AM)  Presence of Pain: denies (11/16/2017 11:29 AM)  Pain Rating Prior to Med Admin: 0 (11/16/2017  9:07 AM)  Pain Rating Post Med Admin: 0 (11/16/2017  9:07 AM)  Janet Score: 10 (11/16/2017 11:29 AM)      "

## 2017-11-17 NOTE — PROGRESS NOTES
Chief Complaint   Patient presents with    Hypertension       Cindy Billingsley is a 56 y.o. female who presents per the Chief Complaint.  Pt is known to me and was last seen by me on 10/18/2017.  All known chronic medical issues have been documented.  She reports elevated reading on her automatic blood pressure cuff for the last few days and states the machine is new.             Hypertension   This is a chronic problem. The current episode started more than 1 year ago. The problem has been waxing and waning since onset. The problem is controlled. Pertinent negatives include no anxiety, blurred vision, chest pain, headaches, malaise/fatigue, neck pain, orthopnea, palpitations, peripheral edema, PND, shortness of breath or sweats. There are no associated agents to hypertension. Risk factors for coronary artery disease include diabetes mellitus, dyslipidemia and post-menopausal state. Past treatments include angiotensin blockers and diuretics. The current treatment provides moderate improvement. Compliance problems include psychosocial issues.  Hypertensive end-organ damage includes retinopathy. There is no history of angina, kidney disease, CAD/MI, CVA, heart failure, left ventricular hypertrophy, PVD, renovascular disease or a thyroid problem. There is no history of chronic renal disease, coarctation of the aorta, hyperaldosteronism, hypercortisolism, hyperparathyroidism, a hypertension causing med, pheochromocytoma or sleep apnea.        ROS  Review of Systems   Constitutional: Negative.  Negative for activity change, appetite change, chills, diaphoresis, fatigue, fever, malaise/fatigue and unexpected weight change.   HENT: Negative.  Negative for congestion, ear pain, hearing loss, nosebleeds, postnasal drip, rhinorrhea, sinus pressure, sneezing, sore throat and trouble swallowing.    Eyes: Negative for blurred vision, pain and visual disturbance.   Respiratory: Negative for cough, choking and shortness of breath.   "  Cardiovascular: Negative for chest pain, palpitations, orthopnea, leg swelling and PND.   Gastrointestinal: Negative for abdominal pain, constipation, diarrhea, nausea and vomiting.   Endocrine: Negative for polydipsia, polyphagia and polyuria.   Genitourinary: Negative for difficulty urinating, dysuria, frequency, impotence and urgency.   Musculoskeletal: Negative.  Negative for arthralgias, back pain, gait problem, joint swelling, myalgias and neck pain.   Skin: Negative.    Allergic/Immunologic: Negative for environmental allergies and food allergies.   Neurological: Negative.  Negative for dizziness, tingling, tremors, seizures, syncope, speech difficulty, weakness, light-headedness, numbness and headaches.   Psychiatric/Behavioral: Negative.  Negative for confusion, decreased concentration, dysphoric mood and sleep disturbance. The patient is not nervous/anxious.        Physical Exam  Vitals:    11/17/17 1521   BP: 138/82   Pulse: 82   Resp: 17   Temp: 98.6 °F (37 °C)    Body mass index is 28.5 kg/m².  Weight: 80.1 kg (176 lb 9.4 oz)   Height: 5' 6" (167.6 cm)     Physical Exam   Constitutional: She is oriented to person, place, and time. She appears well-developed and well-nourished. She is active and cooperative.  Non-toxic appearance. She does not have a sickly appearance. She does not appear ill. No distress.   HENT:   Head: Normocephalic and atraumatic.   Right Ear: Hearing and external ear normal. No decreased hearing is noted.   Left Ear: Hearing and external ear normal. No decreased hearing is noted.   Nose: Nose normal. No rhinorrhea or nasal deformity.   Mouth/Throat: Uvula is midline and oropharynx is clear and moist. She does not have dentures. Normal dentition.   Eyes: Conjunctivae, EOM and lids are normal. Pupils are equal, round, and reactive to light. Right eye exhibits no chemosis, no discharge and no exudate. No foreign body present in the right eye. Left eye exhibits no chemosis, no " discharge and no exudate. No foreign body present in the left eye. No scleral icterus.   Neck: Normal range of motion and full passive range of motion without pain. Neck supple.   Cardiovascular: Normal rate, regular rhythm, S1 normal, S2 normal and normal heart sounds.  Exam reveals no gallop and no friction rub.    No murmur heard.  Pulmonary/Chest: Effort normal and breath sounds normal. No accessory muscle usage. No respiratory distress. She has no decreased breath sounds. She has no wheezes. She has no rhonchi. She has no rales.   Abdominal: Soft. Normal appearance. She exhibits no distension. There is no hepatosplenomegaly. There is no tenderness. There is no rigidity, no rebound and no guarding.   Musculoskeletal: Normal range of motion.   Neurological: She is alert and oriented to person, place, and time. She has normal strength. No cranial nerve deficit or sensory deficit. She exhibits normal muscle tone. She displays no seizure activity. Coordination and gait normal.   Skin: Skin is warm, dry and intact. No rash noted. She is not diaphoretic.   Psychiatric: She has a normal mood and affect. Her speech is normal and behavior is normal. Judgment and thought content normal. Cognition and memory are normal. She is attentive.       Assessment & Plan    1. Essential hypertension  Patient was counseled and encouraged to maintain a low sodium diet, as well as increasing physical activity.  Recommend random BP checks at home on a regular basis.  Repeat BP at end of visit was consistent with initial readings and we agree electronic sphygmomanometer is incorrect.  Advised to have BP rechecked during work tonight by coworker to verify controlled BP. Will continue medication at this time, and follow up in 3-6 months, or sooner if blood pressure begins to increase.         Follow up documented    ACTIVE MEDICAL ISSUES:  Documented in Problem List    PAST MEDICAL HISTORY  Documented    PAST SURGICAL  HISTORY:  Documented    SOCIAL HISTORY:  Documented    FAMILY HISTORY:  Documented    ALLERGIES AND MEDICATIONS: updated and reviewed.  Documented    Health Maintenance       Date Due Completion Date    Hemoglobin A1c 01/18/2018 10/18/2017    Override on 6/26/2015: Done (will do future ,  appoint 06/29/15)    Lipid Panel 02/03/2018 2/3/2017    Override on 1/12/2015: Done (future)    Foot Exam 06/12/2018 6/12/2017    Override on 6/12/2017: Done    Override on 6/26/2015: Done (today)    Eye Exam 06/19/2018 6/19/2017 (Done)    Override on 6/19/2017: Done (Dr Enrique)    Override on 12/16/2016: Done (Dr. Royal)    Override on 4/1/2016: Done (Dr Brown)    Override on 6/26/2015: Declined ( she will go until 08/2015)    Override on 6/24/2014: Declined (had one done)    Pap Smear with HPV Cotest 02/28/2019 2/29/2016    Mammogram 04/17/2019 4/17/2017    TETANUS VACCINE 08/01/2020 8/1/2010    Pneumococcal PPSV23 (Medium Risk) (2) 12/15/2024 8/2/2006    Colonoscopy 11/16/2027 11/16/2017

## 2017-11-24 ENCOUNTER — OFFICE VISIT (OUTPATIENT)
Dept: FAMILY MEDICINE | Facility: CLINIC | Age: 57
End: 2017-11-24
Payer: COMMERCIAL

## 2017-11-24 VITALS
RESPIRATION RATE: 16 BRPM | OXYGEN SATURATION: 97 % | HEIGHT: 66 IN | BODY MASS INDEX: 28.45 KG/M2 | TEMPERATURE: 98 F | HEART RATE: 86 BPM | WEIGHT: 177 LBS | DIASTOLIC BLOOD PRESSURE: 76 MMHG | SYSTOLIC BLOOD PRESSURE: 138 MMHG

## 2017-11-24 DIAGNOSIS — I10 ESSENTIAL HYPERTENSION: Primary | ICD-10-CM

## 2017-11-24 PROCEDURE — 99999 PR PBB SHADOW E&M-EST. PATIENT-LVL III: CPT | Mod: PBBFAC,,, | Performed by: FAMILY MEDICINE

## 2017-11-24 PROCEDURE — 99214 OFFICE O/P EST MOD 30 MIN: CPT | Mod: S$GLB,,, | Performed by: FAMILY MEDICINE

## 2017-11-24 RX ORDER — CLONIDINE HYDROCHLORIDE 0.1 MG/1
0.1 TABLET ORAL DAILY PRN
Qty: 30 TABLET | Refills: 2 | Status: SHIPPED | OUTPATIENT
Start: 2017-11-24 | End: 2018-01-23

## 2017-11-24 RX ORDER — METOPROLOL SUCCINATE 50 MG/1
50 TABLET, EXTENDED RELEASE ORAL DAILY
Qty: 30 TABLET | Refills: 2 | Status: SHIPPED | OUTPATIENT
Start: 2017-11-24 | End: 2018-01-23 | Stop reason: SDUPTHER

## 2017-11-24 NOTE — PROGRESS NOTES
Chief Complaint   Patient presents with    Hypertension       Cindy Billinsgley is a 56 y.o. female who presents per the Chief Complaint.  Pt is known to me and was last seen by me on 11/17/2017.  All known chronic medical issues have been documented.  Patient reports continued elevated BP on her personal blood pressure cuff and at work.  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          Hypertension   This is a chronic problem. The current episode started more than 1 year ago. The problem has been waxing and waning since onset. The problem is controlled. Associated symptoms include headaches. Pertinent negatives include no anxiety, blurred vision, chest pain, malaise/fatigue, neck pain, orthopnea, palpitations, peripheral edema, PND, shortness of breath or sweats. There are no associated agents to hypertension. Risk factors for coronary artery disease include diabetes mellitus, dyslipidemia and post-menopausal state. Past treatments include angiotensin blockers and diuretics. The current treatment provides moderate improvement. Compliance problems include psychosocial issues.  Hypertensive end-organ damage includes retinopathy. There is no history of angina, kidney disease, CAD/MI, CVA, heart failure, left ventricular hypertrophy, PVD, renovascular disease or a thyroid problem. There is no history of chronic renal disease, coarctation of the aorta, hyperaldosteronism, hypercortisolism, hyperparathyroidism, a hypertension causing med, pheochromocytoma or sleep apnea.   Headache    This is a recurrent problem. The current episode started 1 to 4 weeks ago. The problem occurs daily. The problem has been unchanged. The pain is located in the bilateral and frontal region. The pain does not radiate. The pain quality is not similar to prior headaches. The quality of the pain is described as dull. The pain is at a severity of 4/10. The pain is moderate. Associated symptoms include a sore  throat. Pertinent negatives include no abdominal pain, abnormal behavior, anorexia, back pain, blurred vision, coughing, dizziness, drainage, ear pain, eye pain, eye redness, eye watering, facial sweating, fever, hearing loss, insomnia, loss of balance, muscle aches, nausea, neck pain, numbness, phonophobia, photophobia, rhinorrhea, scalp tenderness, seizures, sinus pressure, swollen glands, tingling, tinnitus, visual change, vomiting, weakness or weight loss. Nothing aggravates the symptoms. She has tried NSAIDs for the symptoms. The treatment provided mild relief.        ROS  Review of Systems   Constitutional: Negative.  Negative for activity change, appetite change, chills, diaphoresis, fatigue, fever, malaise/fatigue, unexpected weight change and weight loss.   HENT: Positive for sore throat. Negative for congestion, ear pain, hearing loss, nosebleeds, postnasal drip, rhinorrhea, sinus pressure, sneezing, tinnitus and trouble swallowing.    Eyes: Negative for blurred vision, photophobia, pain, redness and visual disturbance.   Respiratory: Negative for cough, choking and shortness of breath.    Cardiovascular: Negative for chest pain, palpitations, orthopnea, leg swelling and PND.   Gastrointestinal: Negative for abdominal pain, anorexia, constipation, diarrhea, nausea and vomiting.   Genitourinary: Negative for difficulty urinating, dysuria, frequency and urgency.   Musculoskeletal: Negative.  Negative for arthralgias, back pain, gait problem, joint swelling, myalgias and neck pain.   Skin: Negative.    Allergic/Immunologic: Negative for environmental allergies and food allergies.   Neurological: Positive for headaches. Negative for dizziness, tingling, seizures, syncope, weakness, light-headedness, numbness and loss of balance.   Psychiatric/Behavioral: Negative.  Negative for confusion, decreased concentration, dysphoric mood and sleep disturbance. The patient is not nervous/anxious and does not have  "insomnia.        Physical Exam  Vitals:    11/24/17 1140   BP: 138/76   Pulse: 86   Resp: 16   Temp: 97.9 °F (36.6 °C)    Body mass index is 28.57 kg/m².  Weight: 80.3 kg (177 lb 0.5 oz)   Height: 5' 6" (167.6 cm)     Physical Exam   Constitutional: She is oriented to person, place, and time. She appears well-developed and well-nourished. She is active and cooperative.  Non-toxic appearance. She does not have a sickly appearance. She does not appear ill. No distress.   HENT:   Head: Normocephalic and atraumatic.   Right Ear: Hearing and external ear normal. No decreased hearing is noted.   Left Ear: Hearing and external ear normal. No decreased hearing is noted.   Nose: Nose normal. No rhinorrhea or nasal deformity.   Mouth/Throat: Uvula is midline. She does not have dentures. Normal dentition. Posterior oropharyngeal erythema present.   Eyes: Conjunctivae, EOM and lids are normal. Pupils are equal, round, and reactive to light. Right eye exhibits no chemosis, no discharge and no exudate. No foreign body present in the right eye. Left eye exhibits no chemosis, no discharge and no exudate. No foreign body present in the left eye. No scleral icterus.   Neck: Normal range of motion and full passive range of motion without pain. Neck supple.   Cardiovascular: Normal rate, regular rhythm, S1 normal, S2 normal and normal heart sounds.  Exam reveals no gallop and no friction rub.    No murmur heard.  Pulmonary/Chest: Effort normal and breath sounds normal. No accessory muscle usage. No respiratory distress. She has no decreased breath sounds. She has no wheezes. She has no rhonchi. She has no rales.   Abdominal: Soft. Normal appearance. She exhibits no distension. There is no hepatosplenomegaly. There is no tenderness. There is no rigidity, no rebound and no guarding.   Musculoskeletal: Normal range of motion.   Neurological: She is alert and oriented to person, place, and time. She has normal strength. No cranial nerve " deficit or sensory deficit. She exhibits normal muscle tone. She displays no seizure activity. Coordination and gait normal.   Skin: Skin is warm, dry and intact. No rash noted. She is not diaphoretic.   Psychiatric: She has a normal mood and affect. Her speech is normal and behavior is normal. Judgment and thought content normal. Cognition and memory are normal. She is attentive.       Assessment & Plan    1. Essential hypertension  Patient was counseled and encouraged to maintain a low sodium diet, as well as increasing physical activity.  Recommend random BP checks at home on a regular basis.  Repeat BP at end of visit was not necessary. Will continue medication at this time, and follow up in 3-6 months, or sooner if blood pressure begins to increase.  Will add BB which patient previously took and clonidine as needed if BP > 160/100; discussed possible rebound hypertension related to clonidine.     - metoprolol succinate (TOPROL-XL) 50 MG 24 hr tablet; Take 1 tablet (50 mg total) by mouth once daily.  Dispense: 30 tablet; Refill: 2  - cloNIDine (CATAPRES) 0.1 MG tablet; Take 1 tablet (0.1 mg total) by mouth daily as needed.  Dispense: 30 tablet; Refill: 2      Follow up documented    ACTIVE MEDICAL ISSUES:  Documented in Problem List    PAST MEDICAL HISTORY  Documented    PAST SURGICAL HISTORY:  Documented    SOCIAL HISTORY:  Documented    FAMILY HISTORY:  Documented    ALLERGIES AND MEDICATIONS: updated and reviewed.  Documented    Health Maintenance       Date Due Completion Date    Hemoglobin A1c 01/18/2018 10/18/2017    Override on 6/26/2015: Done (will do future ,  appoint 06/29/15)    Lipid Panel 02/03/2018 2/3/2017    Override on 1/12/2015: Done (future)    Foot Exam 06/12/2018 6/12/2017    Override on 6/12/2017: Done    Override on 6/26/2015: Done (today)    Eye Exam 06/19/2018 6/19/2017 (Done)    Override on 6/19/2017: Done (Dr Enrique)    Override on 12/16/2016: Done (Dr. Royal)    Override on  4/1/2016: Done (Dr Brown)    Override on 6/26/2015: Declined ( she will go until 08/2015)    Override on 6/24/2014: Declined (had one done)    Pap Smear with HPV Cotest 02/28/2019 2/29/2016    Mammogram 04/17/2019 4/17/2017    TETANUS VACCINE 08/01/2020 8/1/2010    Pneumococcal PPSV23 (Medium Risk) (2) 12/15/2024 8/2/2006    Colonoscopy 11/16/2027 11/16/2017

## 2017-12-08 DIAGNOSIS — E11.42 TYPE 2 DIABETES MELLITUS WITH DIABETIC POLYNEUROPATHY, WITH LONG-TERM CURRENT USE OF INSULIN: ICD-10-CM

## 2017-12-08 DIAGNOSIS — Z79.4 TYPE 2 DIABETES MELLITUS WITH DIABETIC POLYNEUROPATHY, WITH LONG-TERM CURRENT USE OF INSULIN: ICD-10-CM

## 2017-12-11 RX ORDER — INSULIN GLARGINE 300 U/ML
INJECTION, SOLUTION SUBCUTANEOUS
Qty: 12 ML | Refills: 0 | Status: SHIPPED | OUTPATIENT
Start: 2017-12-11 | End: 2018-02-06 | Stop reason: SDUPTHER

## 2017-12-12 ENCOUNTER — HOSPITAL ENCOUNTER (OUTPATIENT)
Dept: RADIOLOGY | Facility: HOSPITAL | Age: 57
Discharge: HOME | End: 2017-12-12
Attending: NURSE PRACTITIONER
Payer: COMMERCIAL

## 2017-12-12 ENCOUNTER — OFFICE VISIT (OUTPATIENT)
Dept: HEPATOLOGY | Facility: CLINIC | Age: 57
End: 2017-12-12
Payer: COMMERCIAL

## 2017-12-12 VITALS
SYSTOLIC BLOOD PRESSURE: 141 MMHG | WEIGHT: 175.94 LBS | TEMPERATURE: 97 F | BODY MASS INDEX: 28.27 KG/M2 | HEART RATE: 95 BPM | DIASTOLIC BLOOD PRESSURE: 67 MMHG | OXYGEN SATURATION: 94 % | RESPIRATION RATE: 18 BRPM | HEIGHT: 66 IN

## 2017-12-12 DIAGNOSIS — K76.0 NAFLD (NONALCOHOLIC FATTY LIVER DISEASE): Primary | ICD-10-CM

## 2017-12-12 DIAGNOSIS — K76.0 NAFLD (NONALCOHOLIC FATTY LIVER DISEASE): ICD-10-CM

## 2017-12-12 DIAGNOSIS — Z79.4 TYPE 2 DIABETES MELLITUS WITH DIABETIC POLYNEUROPATHY, WITH LONG-TERM CURRENT USE OF INSULIN: ICD-10-CM

## 2017-12-12 DIAGNOSIS — I10 ESSENTIAL HYPERTENSION: ICD-10-CM

## 2017-12-12 DIAGNOSIS — E11.42 TYPE 2 DIABETES MELLITUS WITH DIABETIC POLYNEUROPATHY, WITH LONG-TERM CURRENT USE OF INSULIN: ICD-10-CM

## 2017-12-12 PROCEDURE — 99213 OFFICE O/P EST LOW 20 MIN: CPT | Mod: S$GLB,,, | Performed by: NURSE PRACTITIONER

## 2017-12-12 PROCEDURE — 99999 PR PBB SHADOW E&M-EST. PATIENT-LVL IV: CPT | Mod: PBBFAC,,, | Performed by: NURSE PRACTITIONER

## 2017-12-12 PROCEDURE — 76700 US EXAM ABDOM COMPLETE: CPT | Mod: TC

## 2017-12-12 PROCEDURE — 76700 US EXAM ABDOM COMPLETE: CPT | Mod: 26,,, | Performed by: RADIOLOGY

## 2017-12-12 NOTE — PROGRESS NOTES
[facility] Established Patient Visit    Reason for Visit:  F/u fatty liver    PCP: Azikiwe K Lombard    HPI:  This is a 56 y.o. female here for f/u of fatty liver. She also has some stable liver lesions that are small and cannot be characterized well due to size but have been stable since 2008, likely hemangiomas. Her risk factors for NAFLD include overweight, DM, HTN. She denies any significant alcohol intake. She has been seeing endocrine for her DM and this is getting better controlled.     Her workup has been reassuring that she has no evidence of advanced liver disease. Both Fibrosure and Fibroscan showed no fibrosis. She is immune to hep A per labs and has been vaccinated for hep B. She has normal liver enzymes and synthetic liver functioning. Plts nl at 246. Spleen nl on imaging. She feels well today. Has not lost any weight since last visit. She denies jaundice, dark urine, hematemesis, melena, slowed mentation, abdominal distention.      Her preliminary u/s today shows stable liver lesions, normal liver, no mention of steatosis. CT in 2/2016 suggested presence of hepatic steatosis. Her u/s in 2012 also did not show steatosis.       ROS:   GENERAL: Denies fever, chills, weight loss/gain, fatigue  HEENT: Denies headaches, dizziness, vision/hearing changes  CARDIOVASCULAR: Denies chest pain, palpitations, or edema  RESPIRATORY: Denies dyspnea, cough  GI: Denies abdominal pain, rectal bleeding, nausea, vomiting. No change in bowel pattern or color  : Denies dysuria, hematuria   SKIN: Denies rash, itching   NEURO: Denies confusion, memory loss, or mood changes  PSYCH: Denies depression or anxiety  HEME/LYMPH: Denies easy bruising or bleeding      PMHX:  has a past medical history of Anxiety; Arthritis; Depression; Diabetes mellitus type II; Diabetic peripheral neuropathy (9/10/2012); Facial spasm - right side (9/10/2012); History of irregular menstrual bleeding; Hypertension; and Knee  "pain.    PSHX:  has a past surgical history that includes Tubal ligation; breast reduction; and Colonoscopy (N/A, 11/16/2017).    The patient's social and family histories were reviewed by me and updated in the appropriate section of the electronic medical record.    Allergies   Allergen Reactions    Amlodipine      Other reaction(s): Edema    Pristiq  [Desvenlafaxine]      Other reaction(s): Nausea       Medications reviewed in Epic      Objective Findings:    PHYSICAL EXAM:   Friendly Black or  female, in no acute distress; alert and oriented to person, place and time  VITALS:   BP (!) 141/67 (BP Location: Right arm, Patient Position: Sitting, BP Method: Medium (Automatic))   Pulse 95   Temp 97.1 °F (36.2 °C) (Oral)   Resp 18   Ht 5' 6" (1.676 m)   Wt 79.8 kg (175 lb 14.8 oz)   LMP 02/01/2016 (Approximate)   SpO2 (!) 94%   BMI 28.40 kg/m²   HENT: Normocephalic, without obvious abnormality. Oral mucosa pink and moist. Dentition good.  EYES: Sclerae anicteric.   RESPIRATORY: Normal respiratory effort.   GI: Non-distended.   EXTREMITIES:  No clubbing, cyanosis or edema.  SKIN: Warm and dry. No jaundice. No rashes noted to exposed skin.   NEURO:  Normal gate.  PSYCH:  Memory intact. Thought and speech pattern appropriate. Behavior normal. No depression or anxiety noted.      Labs:  Lab Results   Component Value Date    WBC 7.99 12/12/2017    HGB 12.7 12/12/2017    HCT 38.2 12/12/2017     12/12/2017    CHOL 174 02/03/2017    TRIG 215 (H) 02/03/2017    HDL 35 (L) 02/03/2017     12/12/2017    K 4.1 12/12/2017    CREATININE 0.9 12/12/2017    ALT 19 12/12/2017    AST 20 12/12/2017    ALKPHOS 121 12/12/2017    BILITOT 0.6 12/12/2017    ALBUMIN 3.4 (L) 12/12/2017    INR 1.1 12/12/2017    AFP 3.1 12/12/2017       ASSESSMENT:  56 y.o. Black or  female with:  1. ? NAFLD  -- transaminases normal  -- CT showed hepatic steatosis but ultrasounds show normal liver, no " steatosis  -- synthetic liver function nl  -- risk factors for fatty liver include overweight, HTN, DM  -- Fibrosure = F0  -- Fibroscan = F0-1  -- (+) immunity to hep A and B  2. Liver lesions, stable since 2008, indeterminate due to small size  -- discussed the stability over time suggests these are benign and recommend no further evaluation for these      EDUCATION:   We discussed the manifestations of NAFLD. At this time there is no FDA approved therapy for NAFLD.   The patient and I discussed the importance of diet, exercise, and weight loss for management of NAFLD. We discussed a low fat, low carb/low sugar, high fiber diet, and a goal of 30 minutes of exercise 5 times per week.   Pt is aware that fatty liver can progress to steatohepatitis and possibly to cirrhosis, putting one at increased risk for liver cancer, liver failure, and death.      Reassured her that her workup has shown no evidence of cirrhosis and it appears she may have simple steatosis that is not causing any problems in her liver. Ultrasound do not show steatosis.    PLAN:  1. Recommended weight loss through diet and exercise  2. Recommend good control of cholesterol, blood pressure, blood sugar levels  3. No f/u with us needed.  PCP can continue to monitor with routine labs and re-refer her if ever felt necessary.    Thank you for allowing me to participate in the care of JOMAR Irvin

## 2017-12-13 DIAGNOSIS — I10 ESSENTIAL HYPERTENSION: ICD-10-CM

## 2017-12-13 RX ORDER — VALSARTAN AND HYDROCHLOROTHIAZIDE 320; 25 MG/1; MG/1
TABLET, FILM COATED ORAL
Qty: 90 TABLET | Refills: 1 | Status: SHIPPED | OUTPATIENT
Start: 2017-12-13 | End: 2018-07-17 | Stop reason: SDUPTHER

## 2018-01-15 ENCOUNTER — HOSPITAL ENCOUNTER (OUTPATIENT)
Facility: HOSPITAL | Age: 58
Discharge: HOME | End: 2018-01-15
Attending: EMERGENCY MEDICINE | Admitting: HOSPITALIST
Payer: COMMERCIAL

## 2018-01-15 VITALS
WEIGHT: 180 LBS | OXYGEN SATURATION: 96 % | RESPIRATION RATE: 18 BRPM | BODY MASS INDEX: 28.93 KG/M2 | DIASTOLIC BLOOD PRESSURE: 80 MMHG | HEART RATE: 80 BPM | TEMPERATURE: 98 F | SYSTOLIC BLOOD PRESSURE: 150 MMHG | HEIGHT: 66 IN

## 2018-01-15 DIAGNOSIS — R07.9 CHEST PAIN: ICD-10-CM

## 2018-01-15 DIAGNOSIS — I10 ESSENTIAL HYPERTENSION: ICD-10-CM

## 2018-01-15 DIAGNOSIS — R10.13 EPIGASTRIC PAIN: ICD-10-CM

## 2018-01-15 DIAGNOSIS — R07.9 CHEST PAIN, UNSPECIFIED TYPE: Primary | ICD-10-CM

## 2018-01-15 DIAGNOSIS — K21.9 GASTROESOPHAGEAL REFLUX DISEASE WITHOUT ESOPHAGITIS: ICD-10-CM

## 2018-01-15 DIAGNOSIS — E11.42 TYPE 2 DIABETES MELLITUS WITH DIABETIC POLYNEUROPATHY, WITH LONG-TERM CURRENT USE OF INSULIN: ICD-10-CM

## 2018-01-15 DIAGNOSIS — Z79.4 TYPE 2 DIABETES MELLITUS WITH DIABETIC POLYNEUROPATHY, WITH LONG-TERM CURRENT USE OF INSULIN: ICD-10-CM

## 2018-01-15 LAB
ALBUMIN SERPL BCP-MCNC: 3.7 G/DL
ALP SERPL-CCNC: 124 U/L
ALT SERPL W/O P-5'-P-CCNC: 19 U/L
ANION GAP SERPL CALC-SCNC: 10 MMOL/L
AST SERPL-CCNC: 16 U/L
BASOPHILS # BLD AUTO: 0.03 K/UL
BASOPHILS NFR BLD: 0.4 %
BILIRUB SERPL-MCNC: 0.7 MG/DL
BNP SERPL-MCNC: 12 PG/ML
BUN SERPL-MCNC: 16 MG/DL
CALCIUM SERPL-MCNC: 9.9 MG/DL
CHLORIDE SERPL-SCNC: 102 MMOL/L
CHOLEST SERPL-MCNC: 191 MG/DL
CHOLEST/HDLC SERPL: 4.1 {RATIO}
CK MB SERPL-MCNC: 1.3 NG/ML
CK MB SERPL-MCNC: 1.4 NG/ML
CK MB SERPL-RTO: 1 %
CK MB SERPL-RTO: 1 %
CK SERPL-CCNC: 128 U/L
CK SERPL-CCNC: 139 U/L
CO2 SERPL-SCNC: 30 MMOL/L
CREAT SERPL-MCNC: 0.9 MG/DL
DIASTOLIC DYSFUNCTION: YES
DIFFERENTIAL METHOD: NORMAL
EOSINOPHIL # BLD AUTO: 0.1 K/UL
EOSINOPHIL NFR BLD: 1.6 %
ERYTHROCYTE [DISTWIDTH] IN BLOOD BY AUTOMATED COUNT: 12.9 %
EST. GFR  (AFRICAN AMERICAN): >60 ML/MIN/1.73 M^2
EST. GFR  (NON AFRICAN AMERICAN): >60 ML/MIN/1.73 M^2
ESTIMATED PA SYSTOLIC PRESSURE: 34.14
GLOBAL PERICARDIAL EFFUSION: ABNORMAL
GLUCOSE SERPL-MCNC: 210 MG/DL
HCT VFR BLD AUTO: 39.1 %
HDLC SERPL-MCNC: 47 MG/DL
HDLC SERPL: 24.6 %
HGB BLD-MCNC: 13.1 G/DL
INR PPP: 1
LDLC SERPL CALC-MCNC: 115.8 MG/DL
LYMPHOCYTES # BLD AUTO: 2 K/UL
LYMPHOCYTES NFR BLD: 26.2 %
MCH RBC QN AUTO: 28.2 PG
MCHC RBC AUTO-ENTMCNC: 33.5 G/DL
MCV RBC AUTO: 84 FL
MITRAL VALVE REGURGITATION: ABNORMAL
MONOCYTES # BLD AUTO: 0.8 K/UL
MONOCYTES NFR BLD: 10.5 %
NEUTROPHILS # BLD AUTO: 4.7 K/UL
NEUTROPHILS NFR BLD: 61.3 %
NONHDLC SERPL-MCNC: 144 MG/DL
PLATELET # BLD AUTO: 248 K/UL
PMV BLD AUTO: 11.2 FL
POCT GLUCOSE: 174 MG/DL (ref 70–110)
POTASSIUM SERPL-SCNC: 3.6 MMOL/L
PROT SERPL-MCNC: 7.4 G/DL
PROTHROMBIN TIME: 10.9 SEC
RBC # BLD AUTO: 4.64 M/UL
RETIRED EF AND QEF - SEE NOTES: 55 (ref 55–65)
SODIUM SERPL-SCNC: 142 MMOL/L
TRICUSPID VALVE REGURGITATION: ABNORMAL
TRIGL SERPL-MCNC: 141 MG/DL
TROPONIN I SERPL DL<=0.01 NG/ML-MCNC: 0.01 NG/ML
TROPONIN I SERPL DL<=0.01 NG/ML-MCNC: <0.006 NG/ML
TROPONIN I SERPL DL<=0.01 NG/ML-MCNC: <0.006 NG/ML
TSH SERPL DL<=0.005 MIU/L-ACNC: 0.95 UIU/ML
WBC # BLD AUTO: 7.6 K/UL

## 2018-01-15 PROCEDURE — 93306 TTE W/DOPPLER COMPLETE: CPT

## 2018-01-15 PROCEDURE — 83880 ASSAY OF NATRIURETIC PEPTIDE: CPT

## 2018-01-15 PROCEDURE — G0378 HOSPITAL OBSERVATION PER HR: HCPCS

## 2018-01-15 PROCEDURE — 93306 TTE W/DOPPLER COMPLETE: CPT | Mod: 26,,, | Performed by: INTERNAL MEDICINE

## 2018-01-15 PROCEDURE — 84484 ASSAY OF TROPONIN QUANT: CPT | Mod: 91

## 2018-01-15 PROCEDURE — 80061 LIPID PANEL: CPT

## 2018-01-15 PROCEDURE — 85025 COMPLETE CBC W/AUTO DIFF WBC: CPT

## 2018-01-15 PROCEDURE — 84484 ASSAY OF TROPONIN QUANT: CPT

## 2018-01-15 PROCEDURE — 84443 ASSAY THYROID STIM HORMONE: CPT

## 2018-01-15 PROCEDURE — 96375 TX/PRO/DX INJ NEW DRUG ADDON: CPT

## 2018-01-15 PROCEDURE — 93010 ELECTROCARDIOGRAM REPORT: CPT | Mod: ,,, | Performed by: INTERNAL MEDICINE

## 2018-01-15 PROCEDURE — 85610 PROTHROMBIN TIME: CPT

## 2018-01-15 PROCEDURE — 99285 EMERGENCY DEPT VISIT HI MDM: CPT | Mod: 25

## 2018-01-15 PROCEDURE — 63600175 PHARM REV CODE 636 W HCPCS: Performed by: EMERGENCY MEDICINE

## 2018-01-15 PROCEDURE — 96374 THER/PROPH/DIAG INJ IV PUSH: CPT

## 2018-01-15 PROCEDURE — S0028 INJECTION, FAMOTIDINE, 20 MG: HCPCS | Performed by: EMERGENCY MEDICINE

## 2018-01-15 PROCEDURE — 82962 GLUCOSE BLOOD TEST: CPT

## 2018-01-15 PROCEDURE — 82553 CREATINE MB FRACTION: CPT | Mod: 91

## 2018-01-15 PROCEDURE — 80053 COMPREHEN METABOLIC PANEL: CPT

## 2018-01-15 PROCEDURE — 25000003 PHARM REV CODE 250: Performed by: EMERGENCY MEDICINE

## 2018-01-15 RX ORDER — ENOXAPARIN SODIUM 100 MG/ML
40 INJECTION SUBCUTANEOUS EVERY 24 HOURS
Status: DISCONTINUED | OUTPATIENT
Start: 2018-01-15 | End: 2018-01-15 | Stop reason: HOSPADM

## 2018-01-15 RX ORDER — METOPROLOL SUCCINATE 50 MG/1
50 TABLET, EXTENDED RELEASE ORAL DAILY
Status: DISCONTINUED | OUTPATIENT
Start: 2018-01-16 | End: 2018-01-15 | Stop reason: HOSPADM

## 2018-01-15 RX ORDER — ONDANSETRON 2 MG/ML
4 INJECTION INTRAMUSCULAR; INTRAVENOUS
Status: COMPLETED | OUTPATIENT
Start: 2018-01-15 | End: 2018-01-15

## 2018-01-15 RX ORDER — IBUPROFEN 800 MG/1
800 TABLET ORAL 2 TIMES DAILY
Qty: 20 TABLET | Refills: 0 | Status: SHIPPED | OUTPATIENT
Start: 2018-01-15 | End: 2019-12-18 | Stop reason: SDUPTHER

## 2018-01-15 RX ORDER — ASPIRIN 325 MG
325 TABLET, DELAYED RELEASE (ENTERIC COATED) ORAL DAILY
Status: DISCONTINUED | OUTPATIENT
Start: 2018-01-16 | End: 2018-01-15

## 2018-01-15 RX ORDER — LOSARTAN POTASSIUM AND HYDROCHLOROTHIAZIDE 12.5; 5 MG/1; MG/1
1 TABLET ORAL DAILY
Status: DISCONTINUED | OUTPATIENT
Start: 2018-01-16 | End: 2018-01-15 | Stop reason: HOSPADM

## 2018-01-15 RX ORDER — BACLOFEN 10 MG/1
10 TABLET ORAL 2 TIMES DAILY
Qty: 20 TABLET | Refills: 0 | Status: SHIPPED | OUTPATIENT
Start: 2018-01-15 | End: 2018-02-27 | Stop reason: ALTCHOICE

## 2018-01-15 RX ORDER — MORPHINE SULFATE 10 MG/ML
2 INJECTION INTRAMUSCULAR; INTRAVENOUS; SUBCUTANEOUS
Status: COMPLETED | OUTPATIENT
Start: 2018-01-15 | End: 2018-01-15

## 2018-01-15 RX ORDER — PANTOPRAZOLE SODIUM 40 MG/1
40 TABLET, DELAYED RELEASE ORAL DAILY
Qty: 30 TABLET | Refills: 1 | Status: SHIPPED | OUTPATIENT
Start: 2018-01-15 | End: 2018-04-26 | Stop reason: SDUPTHER

## 2018-01-15 RX ORDER — ASPIRIN 325 MG
325 TABLET, DELAYED RELEASE (ENTERIC COATED) ORAL
Status: COMPLETED | OUTPATIENT
Start: 2018-01-15 | End: 2018-01-15

## 2018-01-15 RX ORDER — ASPIRIN 81 MG/1
81 TABLET ORAL DAILY
Status: DISCONTINUED | OUTPATIENT
Start: 2018-01-16 | End: 2018-01-15 | Stop reason: HOSPADM

## 2018-01-15 RX ORDER — FAMOTIDINE 10 MG/ML
20 INJECTION INTRAVENOUS
Status: COMPLETED | OUTPATIENT
Start: 2018-01-15 | End: 2018-01-15

## 2018-01-15 RX ADMIN — MORPHINE SULFATE 2 MG: 10 INJECTION INTRAVENOUS at 04:01

## 2018-01-15 RX ADMIN — LIDOCAINE HYDROCHLORIDE: 20 SOLUTION ORAL; TOPICAL at 04:01

## 2018-01-15 RX ADMIN — FAMOTIDINE 20 MG: 10 INJECTION, SOLUTION INTRAVENOUS at 04:01

## 2018-01-15 RX ADMIN — ASPIRIN 325 MG: 325 TABLET, DELAYED RELEASE ORAL at 04:01

## 2018-01-15 RX ADMIN — ONDANSETRON 4 MG: 2 INJECTION INTRAMUSCULAR; INTRAVENOUS at 04:01

## 2018-01-15 RX ADMIN — NITROGLYCERIN 1 INCH: 20 OINTMENT TOPICAL at 04:01

## 2018-01-15 NOTE — ED PROVIDER NOTES
Encounter Date: 1/15/2018    SCRIBE #1 NOTE: I, Tian Thanh II, am scribing for, and in the presence of,  Cali Reyes MD. I have scribed the following portions of the note - Other sections scribed: HPI, ROS, PE.       History     Chief Complaint   Patient presents with    Chest Pain     reports crushing left sided chest pain that wraps around to her back. Denies radiation to limbs, SOB, N/V or jaw pain. Was seen at urgent care earlier, was told that she has had a heart attack in the past but is not having one now. To come to ED     CC: Chest Pain     HPI: This 57 y.o. female with IDDM, HTN, anxiety, knee pain, arthritis, depression, diabetic peripheral neuropathy, right sided facial spasm, and irregular mestrual presents to the ED c/o acute onset, left sided chest pain with back pain x1 day. Pt reports an intermittent chest pain that is alleviated with staying still. Pt reports her chest pain is exacerbated with certain movements. Pt reports visiting Urgent Care earlier today and being advised to visit the ED after being told she had an abnormal ECG. Pt reports taking Tums, Gas X, and drinking a hot coke with minimal alleviation. Pt denies hx of heart attack. Pt reports taking a baby aspirin at one hour pta. Pt denies nausea, vomiting, SOB and diaphoresis.         The history is provided by the patient. No  was used.     Review of patient's allergies indicates:   Allergen Reactions    Amlodipine      Other reaction(s): Edema    Pristiq  [desvenlafaxine]      Other reaction(s): Nausea     Past Medical History:   Diagnosis Date    Anxiety     Arthritis     Depression     Diabetes mellitus type II     Diabetic peripheral neuropathy 9/10/2012    Facial spasm - right side 9/10/2012    History of irregular menstrual bleeding     Hypertension     Knee pain      Past Surgical History:   Procedure Laterality Date    breast reduction      COLONOSCOPY N/A 11/16/2017    Procedure:  COLONOSCOPY;  Surgeon: Alan Acosta MD;  Location: [facility];  Service: Endoscopy;  Laterality: N/A;  confirmed appt    TUBAL LIGATION       Family History   Problem Relation Age of Onset    Heart disease Mother     Hypertension Mother     Diabetes Father     Breast cancer Neg Hx     Colon cancer Neg Hx     Stroke Neg Hx     Ovarian cancer Neg Hx     Melanoma Neg Hx     Cervical cancer Neg Hx     Endometrial cancer Neg Hx     Vaginal cancer Neg Hx      Social History   Substance Use Topics    Smoking status: Former Smoker     Quit date: 1/9/1990    Smokeless tobacco: Never Used      Comment: patient quit in 1990    Alcohol use 0.0 oz/week      Comment: Occasional     Review of Systems   Constitutional: Negative for chills and fever.   HENT: Negative for congestion, ear pain, rhinorrhea and sore throat.    Eyes: Negative for pain and visual disturbance.   Respiratory: Negative for cough and shortness of breath.    Cardiovascular: Positive for chest pain.   Gastrointestinal: Negative for abdominal pain, diarrhea, nausea and vomiting.   Genitourinary: Negative for dysuria.   Musculoskeletal: Positive for back pain. Negative for neck pain.   Skin: Negative for rash.   Neurological: Negative for headaches.       Physical Exam     Initial Vitals [01/15/18 1035]   BP Pulse Resp Temp SpO2   (!) 184/86 94 18 97.6 °F (36.4 °C) 95 %      MAP       118.67         Physical Exam    Nursing note and vitals reviewed.  Constitutional: She appears well-developed and well-nourished. She is cooperative.  Non-toxic appearance. No distress.   HENT:   Head: Normocephalic and atraumatic.   Mouth/Throat: Oropharynx is clear and moist.   Eyes: Conjunctivae and EOM are normal. Pupils are equal, round, and reactive to light.   Neck: Normal range of motion and full passive range of motion without pain. Neck supple. No thyromegaly present. No JVD present.   Cardiovascular: Normal rate, regular rhythm, normal heart sounds and  normal pulses. Exam reveals no gallop and no friction rub.    No murmur heard.  Pulmonary/Chest: Effort normal and breath sounds normal. No respiratory distress. She has no wheezes. She has no rhonchi. She has no rales.   Abdominal: Soft. Normal appearance and bowel sounds are normal. She exhibits no distension and no mass. There is no tenderness.   Musculoskeletal: Normal range of motion.   Neurological: She is alert and oriented to person, place, and time. She has normal strength. No cranial nerve deficit or sensory deficit.   Skin: Skin is warm, dry and intact. No rash noted.   Psychiatric: She has a normal mood and affect. Her speech is normal and behavior is normal. Judgment and thought content normal.         ED Course   Procedures  Labs Reviewed   COMPREHENSIVE METABOLIC PANEL - Abnormal; Notable for the following:        Result Value    CO2 30 (*)     Glucose 210 (*)     All other components within normal limits   CBC W/ AUTO DIFFERENTIAL   B-TYPE NATRIURETIC PEPTIDE   TROPONIN I   PROTIME-INR                        Scribe Attestation:   Scribe #1: I performed the above scribed service and the documentation accurately describes the services I performed. I attest to the accuracy of the note.    Attending Attestation:           Physician Attestation for Scribe:  Physician Attestation Statement for Scribe #1: I, Cali Reyes MD, reviewed documentation, as scribed by Tian Law II in my presence, and it is both accurate and complete.                 ED Course      Clinical Impression:   Chest pain    Disposition:   Disposition: Admitted  57-year-old black female with a history of diabetes and hypertension psych history of anxiety and depression presents to the ER complaining of one week of chest pain off and on afebrile vitals are normal except for blood pressure 184/86 lungs clear auscultation bilaterally heart regular rate and rhythm no service for murmurs CBC normal complete metabolic panel normal  troponin negative chest x-ray no acute process per radiologist EKG no ischemic changes the patient took a baby aspirin and 5 mg of metoprolol her usual daily dose prior to arrival she went to urgent care Center was sent here for evaluation and admission she has not had a recent cardiac risk stratification test no recent stress test or catheter she does have a family history of her mom dying of a heart attack at age 40.  I gave her 1 inch of Nitropaste to the chest wall, a GI cocktail, 20 mg of Pepcid IV, 325 mg of aspirin by mouth, 2 mg morphine IV, 4 mg of Zofran IV.  I called the nurse practitioner and admitted her under observation for chest pain rule out MI she.  She is hemodynamically stable at the time of admission                        Cali Reyes MD  01/15/18 [id number]

## 2018-01-15 NOTE — ED TRIAGE NOTES
Pt presents to ED c/o L side crushing chest pain since yesterday. Currently rates pain 9/10. Reports hx of MI.

## 2018-01-16 NOTE — HOSPITAL COURSE
Patient admitted with chest pain and ruled out for acute coronary syndrome. EKG is non ischemic. Serial troponin levels normal x 3. BNP normal. CXR without acute cardiothoracic processes. Echocardiogram performed shows EF 55% + DD. Chest pain likely atypical in nature and could be ?MSK +/- GI etiology as she reports symptoms improving with GI cocktail.  Started on NSAID + muscle relaxer, with RX given. Patient is stable and will discharge home at this time with instructions to follow up with PCP and cardiology in one week. Instructed to resume current medication regimen. Resume cardiac diet. Activity as tolerated

## 2018-01-16 NOTE — CARE UPDATE
01/15/2018      Cindy Billingsley  [address]  [facility] [address]       [facility] Department of Hospital Medicine  [address] [place] [address], [place] [address] [phone] fax    Cindy Billingsley has been hospitalized at the [facility] since 1/15/2018.  Please excuse the patient from duties.  Patient may return on 01/17/2017 .  No restrictions.     Please contact me if you have any questions.                  __________________________  LAURIE Rodgers  01/15/2018

## 2018-01-16 NOTE — DISCHARGE SUMMARY
"[facility] Medicine  Discharge Summary      Patient Name: Cindy Billingsley  MRN: [id number]  Admission Date: 1/15/2018  Hospital Length of Stay: 0 days  Discharge Date and Time: 1/15/2018  6:57 PM  Attending Physician: No att. providers found   Discharging Provider: LAURIE Rodgers  Primary Care Provider: Azikiwe K Lombard, MD      HPI:   Cindy Billingsley is a 57 y.o. female with a history as below who presented to the ED from Urgent Care for evaluation of chest pain. Patient reports reproducible mid-sternal chest pain radiating to back arm and shoulder, worse with movement. Reports pain continuous since yesterday and states when she woke up this AM it was still there so she went to Urgent Care.  At Urgent Care an EKG was completed, and per the patient, she was told she had a "heart attack".  Patient further states she thought it was gas pains, so she attempted relief with tums, gas-x and aleve with very minimal relief.  She denies fever, chills, diaphoresis, SOB, palpitations, abdominal pain, NVD or any other associated symptoms. She does not smoke or drink. Patient reports getting ASA and NTG paste, which did not help, then states she go GI cocktail, which improved her symptoms. Found in ED to have non-ischemic EKG.  Initial troponin negative. BNP wnl. CXR is non-revealing.  Admitted to [facility] for ACS rule out, however, discharged in the ED with negative work-up.   No previous cardiac issues or recent w/u. She denies recurrent CP and is non-distressed.                                          * No surgery found *      Hospital Course:   Patient admitted with chest pain and ruled out for acute coronary syndrome. EKG is non ischemic. Serial troponin levels normal x 3. BNP normal. CXR without acute cardiothoracic processes. Echocardiogram performed shows EF 55% + DD. Chest pain likely atypical in nature and could be ?MSK +/- GI etiology as she reports symptoms improving with GI cocktail.  " Started on NSAID + muscle relaxer, with RX given. Patient is stable and will discharge home at this time with instructions to follow up with PCP and cardiology in one week. Instructed to resume current medication regimen. Resume cardiac diet. Activity as tolerated       Consults:     No new Assessment & Plan notes have been filed under this hospital service since the last note was generated.  Service: Hospital Medicine    Final Active Diagnoses:    Diagnosis Date Noted POA    PRINCIPAL PROBLEM:  Chest pain [R07.9] 01/15/2018 Yes    Essential hypertension [I10] 01/23/2013 Yes    GERD (gastroesophageal reflux disease) [K21.9] 07/09/2013 Yes    Type 2 diabetes mellitus with diabetic polyneuropathy, with long-term current use of insulin [E11.42, Z79.4] 07/20/2012 Not Applicable      Problems Resolved During this Admission:    Diagnosis Date Noted Date Resolved POA       Discharged Condition: good    Disposition: Home or Self Care    Follow Up:  Follow-up Information     Azikiwe K Lombard, MD.    Specialty:  Family Medicine  Contact information:  [address] [place] [address]  [phone]             Milton Boston MD.    Specialties:  Cardiology, INTERVENTIONAL CARDIOLOGY  Contact information:  [address] [address]  [phone]                 Patient Instructions:     Diet Cardiac     Diet diabetic     Activity as tolerated     Notify your health care provider if you experience any of the following:  persistent nausea and vomiting or diarrhea     Notify your health care provider if you experience any of the following:  severe uncontrolled pain     Notify your health care provider if you experience any of the following:  persistent dizziness, light-headedness, or visual disturbances     Notify your health care provider if you experience any of the following:  difficulty breathing or increased cough         Significant Diagnostic Studies: Labs: All labs within the past 24 hours  "have been reviewed    Pending Diagnostic Studies:     None         Medications:  Reconciled Home Medications:   Discharge Medication List as of 1/15/2018  6:43 PM      START taking these medications    Details   baclofen (LIORESAL) 10 MG tablet Take 1 tablet (10 mg total) by mouth 2 (two) times daily., Starting Mon 1/15/2018, Until Thu 1/25/2018, Normal      ibuprofen (ADVIL,MOTRIN) 800 MG tablet Take 1 tablet (800 mg total) by mouth 2 (two) times daily., Starting Mon 1/15/2018, Until Thu 1/25/2018, Normal         CONTINUE these medications which have CHANGED    Details   pantoprazole (PROTONIX) 40 MG tablet Take 1 tablet (40 mg total) by mouth once daily., Starting Mon 1/15/2018, Until Wed 2/14/2018, Normal         CONTINUE these medications which have NOT CHANGED    Details   aspirin (ADULT ASPIRIN EC LOW STRENGTH) 81 MG EC tablet Take 81 mg by mouth. 1 Tablet, Delayed Release (E.C.) Oral Every day, Until Discontinued, Historical Med      blood sugar diagnostic (BLOOD GLUCOSE TEST) Strp One touch, Normal      insulin lispro (HUMALOG KWIKPEN) 100 unit/mL InPn pen Inject 10 units w/ each meal plus scale 150-200 +2, 201-250 +4, 251-300 +6, 301-350 +8, >350 +10., Normal      insulin syringe-needle U-100 1/2 mL 30 x 5/16" Syrg Starting 12/17/2015, Until Discontinued, Historical Med      lancets (MICROLET LANCET) Misc Inject 1 each into the skin 3 (three) times daily., Starting 11/6/2014, Until Discontinued, Normal      metformin (GLUCOPHAGE) 1000 MG tablet Take 1 tablet (1,000 mg total) by mouth 2 (two) times daily with meals., Starting Mon 6/12/2017, Normal      metoprolol succinate (TOPROL-XL) 50 MG 24 hr tablet Take 1 tablet (50 mg total) by mouth once daily., Starting Fri 11/24/2017, Normal      multivitamin (MULTIVITAMIN) per tablet Take 1 tablet by mouth once daily.  , Starting 9/7/2012, Until Discontinued, Historical Med      pen needle, diabetic (PEN NEEDLE) 31 gauge x 5/16" Ndle USE ONE  SUBCUTANEOUSLY 4 TIMES " DAILY, Normal      TOUJEO SOLOSTAR 300 unit/mL (1.5 mL) InPn pen INJECT 48 UNITS SUBCUTANEOUSLY ONCE DAILY, Normal      valsartan-hydrochlorothiazide (DIOVAN-HCT) 320-25 mg per tablet TAKE ONE TABLET BY MOUTH ONCE DAILY, Normal      VICTOZA 2-ASIYA 0.6 mg/0.1 mL (18 mg/3 mL) PnIj INJECT 1.2MG INTO THE SKIN ONCE DAILY, Normal      vitamin E 400 UNIT capsule Take 400 Units by mouth once daily.  , Starting 9/7/2012, Until Discontinued, Historical Med      blood-glucose meter kit Use as instructed, Normal      cloNIDine (CATAPRES) 0.1 MG tablet Take 1 tablet (0.1 mg total) by mouth daily as needed., Starting Fri 11/24/2017, Normal      diclofenac sodium 1 % Gel Apply 2 g topically 4 (four) times daily., Starting Thu 8/17/2017, Until Sun 8/27/2017, Normal      zolpidem (AMBIEN) 5 MG Tab Take 1 tablet (5 mg total) by mouth every evening., Starting Thu 10/19/2017, Normal             Indwelling Lines/Drains at time of discharge:   Lines/Drains/Airways          No matching active lines, drains, or airways          Time spent on the discharge of patient: 30 minutes  Patient was seen and examined on the date of discharge and determined to be suitable for discharge.         LAURIE Rodgers  Department of Hospital Medicine  [facility]

## 2018-01-16 NOTE — ASSESSMENT & PLAN NOTE
A1C 7.2 Oct 2018. Controlled on a home regimen. POC glucose checks AC and HS. Basal-bolus + Correction scale and adjust accordingly. Hypoglycemia Protocol

## 2018-01-16 NOTE — ASSESSMENT & PLAN NOTE
Admitted for rule out ACS.  Chest pain reproducible on assessment and worse with position changes. Ischemic work-up (-) to date. EKG is non-ischemic. Initial troponin level negative. BNP wnl. Plan for continuous cardiac monitoring. Continue to trend serial troponins q6 hours X 2. ASA/NTG 0.4 mg SL PRN CP. Echo pending.

## 2018-01-16 NOTE — SUBJECTIVE & OBJECTIVE
"Past Medical History:   Diagnosis Date    Anxiety     Arthritis     Depression     Diabetes mellitus type II     Diabetic peripheral neuropathy 9/10/2012    Facial spasm - right side 9/10/2012    History of irregular menstrual bleeding     Hypertension     Knee pain        Past Surgical History:   Procedure Laterality Date    breast reduction      COLONOSCOPY N/A 11/16/2017    Procedure: COLONOSCOPY;  Surgeon: Alan Acosta MD;  Location: [facility];  Service: Endoscopy;  Laterality: N/A;  confirmed appt    TUBAL LIGATION         Review of patient's allergies indicates:   Allergen Reactions    Amlodipine      Other reaction(s): Edema    Pristiq  [desvenlafaxine]      Other reaction(s): Nausea       No current facility-administered medications on file prior to encounter.      Current Outpatient Prescriptions on File Prior to Encounter   Medication Sig    aspirin (ADULT ASPIRIN EC LOW STRENGTH) 81 MG EC tablet Take 81 mg by mouth. 1 Tablet, Delayed Release (E.C.) Oral Every day    blood sugar diagnostic (BLOOD GLUCOSE TEST) Strp One touch (Patient taking differently: 3 (three) times daily. One touch)    insulin lispro (HUMALOG KWIKPEN) 100 unit/mL InPn pen Inject 10 units w/ each meal plus scale 150-200 +2, 201-250 +4, 251-300 +6, 301-350 +8, >350 +10.    insulin syringe-needle U-100 1/2 mL 30 x 5/16" Syrg     lancets (MICROLET LANCET) Misc Inject 1 each into the skin 3 (three) times daily.    metformin (GLUCOPHAGE) 1000 MG tablet Take 1 tablet (1,000 mg total) by mouth 2 (two) times daily with meals. (Patient taking differently: Take 1,000 mg by mouth once daily. )    metoprolol succinate (TOPROL-XL) 50 MG 24 hr tablet Take 1 tablet (50 mg total) by mouth once daily.    multivitamin (MULTIVITAMIN) per tablet Take 1 tablet by mouth once daily.      pen needle, diabetic (PEN NEEDLE) 31 gauge x 5/16" Ndle USE ONE  SUBCUTANEOUSLY 4 TIMES DAILY    TOUJEO SOLOSTAR 300 unit/mL (1.5 mL) InPn pen " INJECT 48 UNITS SUBCUTANEOUSLY ONCE DAILY    valsartan-hydrochlorothiazide (DIOVAN-HCT) 320-25 mg per tablet TAKE ONE TABLET BY MOUTH ONCE DAILY    VICTOZA 2-ASIYA 0.6 mg/0.1 mL (18 mg/3 mL) PnIj INJECT 1.2MG INTO THE SKIN ONCE DAILY    vitamin E 400 UNIT capsule Take 400 Units by mouth once daily.      blood-glucose meter kit Use as instructed    cloNIDine (CATAPRES) 0.1 MG tablet Take 1 tablet (0.1 mg total) by mouth daily as needed.    diclofenac sodium 1 % Gel Apply 2 g topically 4 (four) times daily.    zolpidem (AMBIEN) 5 MG Tab Take 1 tablet (5 mg total) by mouth every evening.    [DISCONTINUED] pantoprazole (PROTONIX) 40 MG tablet Take 1 tablet (40 mg total) by mouth once daily.     Family History     Problem Relation (Age of Onset)    Diabetes Father    Heart disease Mother    Hypertension Mother        Social History Main Topics    Smoking status: Former Smoker     Quit date: 1/9/1990    Smokeless tobacco: Never Used      Comment: patient quit in 1990    Alcohol use 0.0 oz/week      Comment: Occasional    Drug use: No    Sexual activity: Not Currently     Birth control/ protection: None, Rhythm     Review of Systems   Constitutional: Negative for chills, diaphoresis and fever.   HENT: Negative for congestion, postnasal drip, sinus pressure and sore throat.    Eyes: Negative for visual disturbance.   Respiratory: Negative for cough, chest tightness, shortness of breath and wheezing.    Cardiovascular: Positive for chest pain (Reproducible and worsen with prosition changes). Negative for palpitations and leg swelling.   Gastrointestinal: Negative for abdominal distention, abdominal pain, blood in stool, constipation, diarrhea, nausea and vomiting.   Endocrine: Negative.    Genitourinary: Negative for dysuria.   Musculoskeletal: Negative.         Left shoulder and back   Skin: Negative.    Allergic/Immunologic: Negative.    Neurological: Negative for dizziness, weakness, numbness and headaches.    Hematological: Negative.    Psychiatric/Behavioral: Negative.      Objective:     Vital Signs (Most Recent):  Temp: 97.7 °F (36.5 °C) (01/15/18 1604)  Pulse: 80 (01/15/18 1830)  Resp: 18 (01/15/18 1830)  BP: (!) 150/80 (01/15/18 1830)  SpO2: 96 % (01/15/18 1830) Vital Signs (24h Range):  Temp:  [97.6 °F (36.4 °C)-97.7 °F (36.5 °C)] 97.7 °F (36.5 °C)  Pulse:  [76-94] 80  Resp:  [18] 18  SpO2:  [95 %-99 %] 96 %  BP: (135-184)/(73-89) 150/80     Weight: 81.6 kg (180 lb)  Body mass index is 29.05 kg/m².    Physical Exam   Constitutional: She is oriented to person, place, and time. She appears well-developed and well-nourished. She is cooperative.  Non-toxic appearance. No distress.   HENT:   Head: Normocephalic and atraumatic.   Eyes: Conjunctivae and lids are normal. Pupils are equal, round, and reactive to light.   Neck: Trachea normal, normal range of motion and full passive range of motion without pain. Neck supple. Normal carotid pulses and no JVD present. No tracheal deviation present. No thyroid mass and no thyromegaly present.   Cardiovascular: Normal rate, regular rhythm, S1 normal, S2 normal, normal heart sounds and normal pulses.    Pulmonary/Chest: Effort normal and breath sounds normal. No stridor.   Abdominal: Soft. Normal appearance and bowel sounds are normal. There is no tenderness.   Musculoskeletal: Normal range of motion.   Neurological: She is alert and oriented to person, place, and time. She has normal strength. No cranial nerve deficit or sensory deficit.   Skin: Skin is warm, dry and intact. She is not diaphoretic. No cyanosis. Nails show no clubbing.   Psychiatric: She has a normal mood and affect. Her speech is normal and behavior is normal. Judgment and thought content normal. Cognition and memory are normal.         CRANIAL NERVES     CN III, IV, VI   Pupils are equal, round, and reactive to light.       Significant Labs:   CBC:   Recent Labs  Lab 01/15/18  1130   WBC 7.60   HGB 13.1   HCT  39.1        CMP:   Recent Labs  Lab 01/15/18  1130      K 3.6      CO2 30*   *   BUN 16   CREATININE 0.9   CALCIUM 9.9   PROT 7.4   ALBUMIN 3.7   BILITOT 0.7   ALKPHOS 124   AST 16   ALT 19   ANIONGAP 10   EGFRNONAA >60     Cardiac Markers:   Recent Labs  Lab 01/15/18  1130   BNP 12       Recent Labs  Lab 01/15/18  1130 01/15/18  1555 01/15/18  1740   CPK  --  139 128   TROPONINI <0.006 <0.006 0.008   CPKMB  --  1.4  --    MB  --  1.0  --        Coagulation:   Recent Labs  Lab 01/15/18  1130   INR 1.0     Significant Imaging:   Imaging Results          X-Ray Chest PA And Lateral (Final result)  Result time 01/15/18 11:21:19    Final result by Guillaume Pineda Jr., MD (01/15/18 11:21:19)                 Impression:     No acute abnormality evident.      Electronically signed by: GUILLAUME PINEDA MD  Date:     01/15/18  Time:    11:21              Narrative:    Chest 2 views.  Prior of November 2008 not available for direct comparison.  Heart size and pulmonary vessels are normal.  The lungs are fairly well aerated.  No confluent consolidation.  Degenerative change of the spine.

## 2018-01-16 NOTE — H&P
"[facility] Hospital Medicine  History & Physical    Patient Name: Cindy Billingsley  MRN: [id number]  Admission Date: 1/15/2018  Attending Physician: Cali Reyes MD   Primary Care Provider: Azikiwe K Lombard, MD         Patient information was obtained from patient and ER records.     Subjective:     Principal Problem:Chest pain    Chief Complaint:   Chief Complaint   Patient presents with    Chest Pain     reports crushing left sided chest pain that wraps around to her back. Denies radiation to limbs, SOB, N/V or jaw pain. Was seen at urgent care earlier, was told that she has had a heart attack in the past but is not having one now. To come to ED        HPI: Cindy Billingsley is a 57 y.o. female with a history as below who presented to the ED from Urgent Care for evaluation of chest pain. Patient reports reproducible mid-sternal chest pain radiating to back arm and shoulder, worse with movement. Reports pain continuous since yesterday and states when she woke up this AM it was still there so she went to Urgent Care.  At Urgent Care an EKG was completed, and per the patient, she was told she had a "heart attack".  Patient further states she thought it was gas pains, so she attempted relief with tums, gas-x and aleve with very minimal relief.  She denies fever, chills, diaphoresis, SOB, palpitations, abdominal pain, NVD or any other associated symptoms. She does not smoke or drink. Patient reports getting ASA and NTG paste, which did not help, then states she go GI cocktail, which improved her symptoms. Found in ED to have non-ischemic EKG.  Initial troponin negative. BNP wnl. CXR is non-revealing.  Admitted to OBS for ACS rule out, however, discharged in the ED with negative work-up.   No previous cardiac issues or recent w/u. She denies recurrent CP and is non-distressed.                                          Past Medical History:   Diagnosis Date    Anxiety     Arthritis     Depression  " "   Diabetes mellitus type II     Diabetic peripheral neuropathy 9/10/2012    Facial spasm - right side 9/10/2012    History of irregular menstrual bleeding     Hypertension     Knee pain        Past Surgical History:   Procedure Laterality Date    breast reduction      COLONOSCOPY N/A 11/16/2017    Procedure: COLONOSCOPY;  Surgeon: Alan Acosta MD;  Location: [facility];  Service: Endoscopy;  Laterality: N/A;  confirmed appt    TUBAL LIGATION         Review of patient's allergies indicates:   Allergen Reactions    Amlodipine      Other reaction(s): Edema    Pristiq  [desvenlafaxine]      Other reaction(s): Nausea       No current facility-administered medications on file prior to encounter.      Current Outpatient Prescriptions on File Prior to Encounter   Medication Sig    aspirin (ADULT ASPIRIN EC LOW STRENGTH) 81 MG EC tablet Take 81 mg by mouth. 1 Tablet, Delayed Release (E.C.) Oral Every day    blood sugar diagnostic (BLOOD GLUCOSE TEST) Strp One touch (Patient taking differently: 3 (three) times daily. One touch)    insulin lispro (HUMALOG KWIKPEN) 100 unit/mL InPn pen Inject 10 units w/ each meal plus scale 150-200 +2, 201-250 +4, 251-300 +6, 301-350 +8, >350 +10.    insulin syringe-needle U-100 1/2 mL 30 x 5/16" Syrg     lancets (MICROLET LANCET) Misc Inject 1 each into the skin 3 (three) times daily.    metformin (GLUCOPHAGE) 1000 MG tablet Take 1 tablet (1,000 mg total) by mouth 2 (two) times daily with meals. (Patient taking differently: Take 1,000 mg by mouth once daily. )    metoprolol succinate (TOPROL-XL) 50 MG 24 hr tablet Take 1 tablet (50 mg total) by mouth once daily.    multivitamin (MULTIVITAMIN) per tablet Take 1 tablet by mouth once daily.      pen needle, diabetic (PEN NEEDLE) 31 gauge x 5/16" Ndle USE ONE  SUBCUTANEOUSLY 4 TIMES DAILY    TOUJEO SOLOSTAR 300 unit/mL (1.5 mL) InPn pen INJECT 48 UNITS SUBCUTANEOUSLY ONCE DAILY    valsartan-hydrochlorothiazide " (DIOVAN-HCT) 320-25 mg per tablet TAKE ONE TABLET BY MOUTH ONCE DAILY    VICTOZA 2-ASIYA 0.6 mg/0.1 mL (18 mg/3 mL) PnIj INJECT 1.2MG INTO THE SKIN ONCE DAILY    vitamin E 400 UNIT capsule Take 400 Units by mouth once daily.      blood-glucose meter kit Use as instructed    cloNIDine (CATAPRES) 0.1 MG tablet Take 1 tablet (0.1 mg total) by mouth daily as needed.    diclofenac sodium 1 % Gel Apply 2 g topically 4 (four) times daily.    zolpidem (AMBIEN) 5 MG Tab Take 1 tablet (5 mg total) by mouth every evening.    [DISCONTINUED] pantoprazole (PROTONIX) 40 MG tablet Take 1 tablet (40 mg total) by mouth once daily.     Family History     Problem Relation (Age of Onset)    Diabetes Father    Heart disease Mother    Hypertension Mother        Social History Main Topics    Smoking status: Former Smoker     Quit date: 1/9/1990    Smokeless tobacco: Never Used      Comment: patient quit in 1990    Alcohol use 0.0 oz/week      Comment: Occasional    Drug use: No    Sexual activity: Not Currently     Birth control/ protection: None, Rhythm     Review of Systems   Constitutional: Negative for chills, diaphoresis and fever.   HENT: Negative for congestion, postnasal drip, sinus pressure and sore throat.    Eyes: Negative for visual disturbance.   Respiratory: Negative for cough, chest tightness, shortness of breath and wheezing.    Cardiovascular: Positive for chest pain (Reproducible and worsen with prosition changes). Negative for palpitations and leg swelling.   Gastrointestinal: Negative for abdominal distention, abdominal pain, blood in stool, constipation, diarrhea, nausea and vomiting.   Endocrine: Negative.    Genitourinary: Negative for dysuria.   Musculoskeletal: Negative.         Left shoulder and back   Skin: Negative.    Allergic/Immunologic: Negative.    Neurological: Negative for dizziness, weakness, numbness and headaches.   Hematological: Negative.    Psychiatric/Behavioral: Negative.      Objective:      Vital Signs (Most Recent):  Temp: 97.7 °F (36.5 °C) (01/15/18 1604)  Pulse: 80 (01/15/18 1830)  Resp: 18 (01/15/18 1830)  BP: (!) 150/80 (01/15/18 1830)  SpO2: 96 % (01/15/18 1830) Vital Signs (24h Range):  Temp:  [97.6 °F (36.4 °C)-97.7 °F (36.5 °C)] 97.7 °F (36.5 °C)  Pulse:  [76-94] 80  Resp:  [18] 18  SpO2:  [95 %-99 %] 96 %  BP: (135-184)/(73-89) 150/80     Weight: 81.6 kg (180 lb)  Body mass index is 29.05 kg/m².    Physical Exam   Constitutional: She is oriented to person, place, and time. She appears well-developed and well-nourished. She is cooperative.  Non-toxic appearance. No distress.   HENT:   Head: Normocephalic and atraumatic.   Eyes: Conjunctivae and lids are normal. Pupils are equal, round, and reactive to light.   Neck: Trachea normal, normal range of motion and full passive range of motion without pain. Neck supple. Normal carotid pulses and no JVD present. No tracheal deviation present. No thyroid mass and no thyromegaly present.   Cardiovascular: Normal rate, regular rhythm, S1 normal, S2 normal, normal heart sounds and normal pulses.    Pulmonary/Chest: Effort normal and breath sounds normal. No stridor.   Abdominal: Soft. Normal appearance and bowel sounds are normal. There is no tenderness.   Musculoskeletal: Normal range of motion.   Neurological: She is alert and oriented to person, place, and time. She has normal strength. No cranial nerve deficit or sensory deficit.   Skin: Skin is warm, dry and intact. She is not diaphoretic. No cyanosis. Nails show no clubbing.   Psychiatric: She has a normal mood and affect. Her speech is normal and behavior is normal. Judgment and thought content normal. Cognition and memory are normal.         CRANIAL NERVES     CN III, IV, VI   Pupils are equal, round, and reactive to light.       Significant Labs:   CBC:   Recent Labs  Lab 01/15/18  1130   WBC 7.60   HGB 13.1   HCT 39.1        CMP:   Recent Labs  Lab 01/15/18  1130      K 3.6   CL  102   CO2 30*   *   BUN 16   CREATININE 0.9   CALCIUM 9.9   PROT 7.4   ALBUMIN 3.7   BILITOT 0.7   ALKPHOS 124   AST 16   ALT 19   ANIONGAP 10   EGFRNONAA >60     Cardiac Markers:   Recent Labs  Lab 01/15/18  1130   BNP 12       Recent Labs  Lab 01/15/18  1130 01/15/18  1555 01/15/18  1740   CPK  --  139 128   TROPONINI <0.006 <0.006 0.008   CPKMB  --  1.4  --    MB  --  1.0  --        Coagulation:   Recent Labs  Lab 01/15/18  1130   INR 1.0     Significant Imaging:   Imaging Results          X-Ray Chest PA And Lateral (Final result)  Result time 01/15/18 11:21:19    Final result by Guillaume Pineda Jr., MD (01/15/18 11:21:19)                 Impression:     No acute abnormality evident.      Electronically signed by: GUILLAUME PINEDA MD  Date:     01/15/18  Time:    11:21              Narrative:    Chest 2 views.  Prior of November 2008 not available for direct comparison.  Heart size and pulmonary vessels are normal.  The lungs are fairly well aerated.  No confluent consolidation.  Degenerative change of the spine.                                Assessment/Plan:     * Chest pain    Admitted for rule out ACS.  Chest pain reproducible on assessment and worse with position changes. Ischemic work-up (-) to date. EKG is non-ischemic. Initial troponin level negative. BNP wnl. Plan for continuous cardiac monitoring. Continue to trend serial troponins q6 hours X 2. ASA/NTG 0.4 mg SL PRN CP. Echo pending.                 Essential hypertension    Continue current antihypertensive medication regimen.           GERD (gastroesophageal reflux disease)    Resume PPI          Type 2 diabetes mellitus with diabetic polyneuropathy, with long-term current use of insulin    A1C 7.2 Oct 2018. Controlled on a home regimen. POC glucose checks AC and HS. Basal-bolus + Correction scale and adjust accordingly. Hypoglycemia Protocol                  VTE Risk Mitigation         Ordered     enoxaparin injection 40 mg  Daily     Route:   Subcutaneous        01/15/18 1821     Medium Risk of VTE  Once      01/15/18 1821             LAURIE Rodgers  Department of Hospital Medicine   [facility]

## 2018-01-16 NOTE — HPI
"Cindy Billingsley is a 57 y.o. female with a history as below who presented to the ED from Urgent Care for evaluation of chest pain. Patient reports reproducible mid-sternal chest pain radiating to back arm and shoulder, worse with movement. Reports pain continuous since yesterday and states when she woke up this AM it was still there so she went to Urgent Care.  At Urgent Care an EKG was completed, and per the patient, she was told she had a "heart attack".  Patient further states she thought it was gas pains, so she attempted relief with tums, gas-x and aleve with very minimal relief.  She denies fever, chills, diaphoresis, SOB, palpitations, abdominal pain, NVD or any other associated symptoms. She does not smoke or drink. Patient reports getting ASA and NTG paste, which did not help, then states she go GI cocktail, which improved her symptoms. Found in ED to have non-ischemic EKG.  Initial troponin negative. BNP wnl. CXR is non-revealing.  Admitted to OBS for ACS rule out, however, discharged in the ED with negative work-up.   No previous cardiac issues or recent w/u. She denies recurrent CP and is non-distressed.                                        "

## 2018-01-23 ENCOUNTER — OFFICE VISIT (OUTPATIENT)
Dept: CARDIOLOGY | Facility: CLINIC | Age: 58
End: 2018-01-23
Payer: COMMERCIAL

## 2018-01-23 VITALS
OXYGEN SATURATION: 95 % | HEIGHT: 66 IN | BODY MASS INDEX: 29.23 KG/M2 | WEIGHT: 181.88 LBS | HEART RATE: 89 BPM | DIASTOLIC BLOOD PRESSURE: 80 MMHG | RESPIRATION RATE: 15 BRPM | SYSTOLIC BLOOD PRESSURE: 198 MMHG

## 2018-01-23 DIAGNOSIS — Z79.4 LONG-TERM CURRENT USE OF INSULIN FOR DIABETES MELLITUS: ICD-10-CM

## 2018-01-23 DIAGNOSIS — R07.9 CHEST PAIN, UNSPECIFIED TYPE: Primary | ICD-10-CM

## 2018-01-23 DIAGNOSIS — E11.42 TYPE 2 DIABETES MELLITUS WITH DIABETIC POLYNEUROPATHY, WITH LONG-TERM CURRENT USE OF INSULIN: ICD-10-CM

## 2018-01-23 DIAGNOSIS — E11.9 LONG-TERM CURRENT USE OF INSULIN FOR DIABETES MELLITUS: ICD-10-CM

## 2018-01-23 DIAGNOSIS — I10 ESSENTIAL HYPERTENSION: ICD-10-CM

## 2018-01-23 DIAGNOSIS — Z79.4 TYPE 2 DIABETES MELLITUS WITH DIABETIC POLYNEUROPATHY, WITH LONG-TERM CURRENT USE OF INSULIN: ICD-10-CM

## 2018-01-23 PROCEDURE — 99215 OFFICE O/P EST HI 40 MIN: CPT | Mod: S$GLB,,, | Performed by: INTERNAL MEDICINE

## 2018-01-23 PROCEDURE — 99999 PR PBB SHADOW E&M-EST. PATIENT-LVL III: CPT | Mod: PBBFAC,,, | Performed by: INTERNAL MEDICINE

## 2018-01-23 RX ORDER — ATORVASTATIN CALCIUM 40 MG/1
40 TABLET, FILM COATED ORAL NIGHTLY
Qty: 90 TABLET | Refills: 3 | Status: SHIPPED | OUTPATIENT
Start: 2018-01-23 | End: 2019-01-25 | Stop reason: SDUPTHER

## 2018-01-23 RX ORDER — AMLODIPINE BESYLATE 5 MG/1
5 TABLET ORAL DAILY
Qty: 90 TABLET | Refills: 3 | Status: SHIPPED | OUTPATIENT
Start: 2018-01-23 | End: 2018-09-25 | Stop reason: SDUPTHER

## 2018-01-23 RX ORDER — METOPROLOL SUCCINATE 100 MG/1
100 TABLET, EXTENDED RELEASE ORAL DAILY
Qty: 90 TABLET | Refills: 3 | Status: SHIPPED | OUTPATIENT
Start: 2018-01-23 | End: 2019-01-29 | Stop reason: SDUPTHER

## 2018-01-23 NOTE — PROGRESS NOTES
CARDIOVASCULAR CONSULTATION    REASON FOR CONSULT:   Cindy Billingsley is a 57 y.o. female who presents for eval of HTN, recent ER visit for CP.    PCP: [place]  HISTORY OF PRESENT ILLNESS:   Last seen by Dr. Pina 7/16/2015.    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  She recently presented to the emergency room with chest discomfort via an urgent care center.  Symptoms seem to be atypical and she ruled out for myocardial infarction.  Echocardiogram was normal.  She's had no further chest discomfort.  Her blood pressure has remained uncontrolled, and the patient attributes some of this to significant anxiety.  I've suggested she follow-up with her primary care physician to consider management of anxiety and/or depression.  She otherwise has had no palpitations, lightheadedness, dizziness, or syncope.  There's been no PND, orthopnea, or lower extremity edema.  She denies melena, hematuria, or claudicant symptoms.    Family history appears to be negative for premature CAD.    The patient is a nonsmoker.    CARDIOVASCULAR HISTORY:   none    PAST MEDICAL HISTORY:     Past Medical History:   Diagnosis Date    Anxiety     Arthritis     Depression     Diabetes mellitus type II     Diabetic peripheral neuropathy 9/10/2012    Facial spasm - right side 9/10/2012    History of irregular menstrual bleeding     Hypertension     Knee pain        PAST SURGICAL HISTORY:     Past Surgical History:   Procedure Laterality Date    breast reduction      COLONOSCOPY N/A 11/16/2017    Procedure: COLONOSCOPY;  Surgeon: Aaln Acosta MD;  Location: [facility];  Service: Endoscopy;  Laterality: N/A;  confirmed appt    TUBAL LIGATION         ALLERGIES AND MEDICATION:     Review of patient's allergies indicates:   Allergen Reactions    Amlodipine      Other reaction(s): Edema    Pristiq  [desvenlafaxine]      Other reaction(s): Nausea     Previous Medications     "ASPIRIN (ADULT ASPIRIN EC LOW STRENGTH) 81 MG EC TABLET    Take 81 mg by mouth. 1 Tablet, Delayed Release (E.C.) Oral Every day    BACLOFEN (LIORESAL) 10 MG TABLET    Take 1 tablet (10 mg total) by mouth 2 (two) times daily.    BLOOD SUGAR DIAGNOSTIC (BLOOD GLUCOSE TEST) STRP    One touch    BLOOD-GLUCOSE METER KIT    Use as instructed    CLONIDINE (CATAPRES) 0.1 MG TABLET    Take 1 tablet (0.1 mg total) by mouth daily as needed.    DICLOFENAC SODIUM 1 % GEL    Apply 2 g topically 4 (four) times daily.    IBUPROFEN (ADVIL,MOTRIN) 800 MG TABLET    Take 1 tablet (800 mg total) by mouth 2 (two) times daily.    INSULIN LISPRO (HUMALOG KWIKPEN) 100 UNIT/ML INPN PEN    Inject 10 units w/ each meal plus scale 150-200 +2, 201-250 +4, 251-300 +6, 301-350 +8, >350 +10.    INSULIN SYRINGE-NEEDLE U-100 1/2 ML 30 X 5/16" SYRG        LANCETS (MICROLET LANCET) MISC    Inject 1 each into the skin 3 (three) times daily.    METFORMIN (GLUCOPHAGE) 1000 MG TABLET    Take 1 tablet (1,000 mg total) by mouth 2 (two) times daily with meals.    METOPROLOL SUCCINATE (TOPROL-XL) 50 MG 24 HR TABLET    Take 1 tablet (50 mg total) by mouth once daily.    MULTIVITAMIN (MULTIVITAMIN) PER TABLET    Take 1 tablet by mouth once daily.      PANTOPRAZOLE (PROTONIX) 40 MG TABLET    Take 1 tablet (40 mg total) by mouth once daily.    PEN NEEDLE, DIABETIC (PEN NEEDLE) 31 GAUGE X 5/16" NDLE    USE ONE  SUBCUTANEOUSLY 4 TIMES DAILY    TOUJEO SOLOSTAR 300 UNIT/ML (1.5 ML) INPN PEN    INJECT 48 UNITS SUBCUTANEOUSLY ONCE DAILY    VALSARTAN-HYDROCHLOROTHIAZIDE (DIOVAN-HCT) 320-25 MG PER TABLET    TAKE ONE TABLET BY MOUTH ONCE DAILY    VICTOZA 2-ASIYA 0.6 MG/0.1 ML (18 MG/3 ML) PNIJ    INJECT 1.2MG INTO THE SKIN ONCE DAILY    VITAMIN E 400 UNIT CAPSULE    Take 400 Units by mouth once daily.      ZOLPIDEM (AMBIEN) 5 MG TAB    Take 1 tablet (5 mg total) by mouth every evening.       SOCIAL HISTORY:     Social History     Social History    Marital status: Single     " Spouse name: N/A    Number of children: N/A    Years of education: N/A     Occupational History    RN [employer]     Social History Main Topics    Smoking status: Former Smoker     Quit date: 1/9/1990    Smokeless tobacco: Never Used      Comment: patient quit in 1990    Alcohol use 0.0 oz/week      Comment: Occasional    Drug use: No    Sexual activity: Not Currently     Birth control/ protection: None, Rhythm     Other Topics Concern    Are You Pregnant Or Think You May Be? No    Breast-Feeding No     Social History Narrative    Works night shift 7p - 7a at [employer] 3 x week; enrolled in [employer]       FAMILY HISTORY:     Family History   Problem Relation Age of Onset    Heart disease Mother     Hypertension Mother     Diabetes Father     Breast cancer Neg Hx     Colon cancer Neg Hx     Stroke Neg Hx     Ovarian cancer Neg Hx     Melanoma Neg Hx     Cervical cancer Neg Hx     Endometrial cancer Neg Hx     Vaginal cancer Neg Hx        REVIEW OF SYSTEMS:   Review of Systems   Constitutional: Negative for chills, diaphoresis and fever.   HENT: Negative for nosebleeds.    Eyes: Negative for blurred vision, double vision and photophobia.   Respiratory: Negative for hemoptysis, shortness of breath and wheezing.    Cardiovascular: Negative for chest pain, palpitations, orthopnea, claudication, leg swelling and PND.   Gastrointestinal: Negative for abdominal pain, blood in stool, heartburn, melena, nausea and vomiting.   Genitourinary: Negative for flank pain and hematuria.   Musculoskeletal: Negative for falls, myalgias and neck pain.   Skin: Negative for rash.   Neurological: Negative for dizziness, seizures, loss of consciousness, weakness and headaches.   Endo/Heme/Allergies: Negative for polydipsia. Does not bruise/bleed easily.   Psychiatric/Behavioral: Negative for depression and memory loss. The patient is nervous/anxious.        PHYSICAL EXAM:     Vitals:    01/23/18 0909  "  BP: (!) 198/80   Pulse: 89   Resp: 15    Body mass index is 29.36 kg/m².  Weight: 82.5 kg (181 lb 14.1 oz)   Height: 5' 6" (167.6 cm)     Physical Exam   Constitutional: She is oriented to person, place, and time. She appears well-developed and well-nourished. She is cooperative.  Non-toxic appearance. No distress.   HENT:   Head: Normocephalic and atraumatic.   Eyes: Conjunctivae and EOM are normal. Pupils are equal, round, and reactive to light. No scleral icterus.   Neck: Trachea normal. Neck supple. Normal carotid pulses and no JVD present. Carotid bruit is not present. No neck rigidity. No edema present. No thyromegaly present.   Cardiovascular: Normal rate, regular rhythm, S1 normal and S2 normal.  PMI is not displaced.  Exam reveals no gallop and no friction rub.    No murmur heard.  Pulses:       Carotid pulses are 2+ on the right side, and 2+ on the left side.  Pulmonary/Chest: Effort normal and breath sounds normal. No respiratory distress. She has no wheezes. She has no rales. She exhibits no tenderness.   Abdominal: Soft. Bowel sounds are normal. She exhibits no distension and no mass. There is no hepatosplenomegaly. There is no tenderness.   Musculoskeletal: She exhibits no edema or tenderness.   Feet:   Right Foot:   Skin Integrity: Negative for ulcer.   Left Foot:   Skin Integrity: Negative for ulcer.   Neurological: She is alert and oriented to person, place, and time. No cranial nerve deficit.   Skin: Skin is warm and dry. No rash noted. No erythema.   Psychiatric: She has a normal mood and affect. Her speech is normal and behavior is normal.   Vitals reviewed.      DATA:   EKG: (personally reviewed tracing)  1/15/18 SR 92    Laboratory:  CBC:    Recent Labs  Lab 02/03/17  0740 12/12/17  0950 01/15/18  1130   WHITE BLOOD CELL COUNT 8.46 7.99 7.60   HEMOGLOBIN 12.9 12.7 13.1   HEMATOCRIT 38.9 38.2 39.1   PLATELETS 281 246 248       CHEMISTRIES:    Recent Labs  Lab 02/03/17  0740 12/12/17  0950 " 01/15/18  1130   GLUCOSE 225 H 136 H 210 H   SODIUM 138 143 142   POTASSIUM 4.4 4.1 3.6   BUN BLD 18 18 16   CREATININE 1.0 0.9 0.9   EGFR IF AFRICAN AMERICAN >60.0 >60.0 >60   EGFR IF NON- >60.0 >60.0 >60   CALCIUM 9.2 9.8 9.9       CARDIAC BIOMARKERS:    Recent Labs  Lab 01/15/18  1130 01/15/18  1555 01/15/18  1740   CPK  --  139 128   CPK MB  --  1.4 1.3   TROPONIN I <0.006 <0.006 0.008       COAGS:    Recent Labs  Lab 06/07/16  0800 12/12/17  0950 01/15/18  1130   INR 1.0 1.1 1.0       LIPIDS/LFTS:    Recent Labs  Lab 06/07/16  0801 02/03/17  0740 12/12/17  0950 01/15/18  1130 01/15/18  1835   CHOLESTEROL 208 H 174  --   --  191   TRIGLYCERIDES 119 215 H  --   --  141   HDL 46 35 L  --   --  47   LDL CHOLESTEROL 138.2 96.0  --   --  115.8   NON-HDL CHOLESTEROL 162 139  --   --  144   AST 18 19 20 16  --    ALT 18 17 19 19  --        The 10-year ASCVD risk score (Anh FERRIS Jr., et al., 2013) is: 47.2%    Values used to calculate the score:      Age: 57 years      Sex: Female      Is Non- : Yes      Diabetic: Yes      Tobacco smoker: No      Systolic Blood Pressure: 198 mmHg      Is BP treated: Yes      HDL Cholesterol: 47 mg/dL      Total Cholesterol: 191 mg/dL      Cardiovascular Testing:  Echo 1/15/18    1 - Normal left ventricular systolic function (EF 55-60%).     2 - Concentric remodeling.     3 - Impaired LV relaxation, elevated LAP (grade 2 diastolic dysfunction).     ASSESSMENT:   # HTN, uncontrolled, ?anxiety component  # DM  # HLP  # recent CP eval    PLAN:   Cont med rx  Stop prn clonidine  Add atorva 40mg qhs  Check lipids/LFT 3 months (mid Apr 2018)  Re-challenge with amlodipine 5mg qd (prior intol (edema) noted)  Inc Toprol XL 100mg qd  Refer back to PCP for eval/tx of anxiety d/o  Plan Ex MPI when BP controlled  If BP remains uncontrolled, add hydrala/imdur +/- KIMBERLY eval +/- MONIQUE eval  Diet/exercise/weight loss, Na+ avoidance  Enroll in digital HTN program.  RTC  1 month    Milton Boston MD, FACC

## 2018-01-30 ENCOUNTER — OFFICE VISIT (OUTPATIENT)
Dept: FAMILY MEDICINE | Facility: CLINIC | Age: 58
End: 2018-01-30
Payer: COMMERCIAL

## 2018-01-30 ENCOUNTER — LAB VISIT (OUTPATIENT)
Dept: LAB | Facility: HOSPITAL | Age: 58
End: 2018-01-30
Attending: FAMILY MEDICINE
Payer: COMMERCIAL

## 2018-01-30 VITALS
OXYGEN SATURATION: 95 % | TEMPERATURE: 99 F | BODY MASS INDEX: 28.27 KG/M2 | DIASTOLIC BLOOD PRESSURE: 66 MMHG | WEIGHT: 175.94 LBS | HEIGHT: 66 IN | HEART RATE: 71 BPM | RESPIRATION RATE: 20 BRPM | SYSTOLIC BLOOD PRESSURE: 130 MMHG

## 2018-01-30 DIAGNOSIS — I10 ESSENTIAL HYPERTENSION: ICD-10-CM

## 2018-01-30 DIAGNOSIS — E78.2 MIXED HYPERLIPIDEMIA: ICD-10-CM

## 2018-01-30 DIAGNOSIS — E11.42 TYPE 2 DIABETES MELLITUS WITH DIABETIC POLYNEUROPATHY, WITH LONG-TERM CURRENT USE OF INSULIN: Primary | ICD-10-CM

## 2018-01-30 DIAGNOSIS — Z79.4 TYPE 2 DIABETES MELLITUS WITH DIABETIC POLYNEUROPATHY, WITH LONG-TERM CURRENT USE OF INSULIN: ICD-10-CM

## 2018-01-30 DIAGNOSIS — Z79.4 TYPE 2 DIABETES MELLITUS WITH DIABETIC POLYNEUROPATHY, WITH LONG-TERM CURRENT USE OF INSULIN: Primary | ICD-10-CM

## 2018-01-30 DIAGNOSIS — E11.42 TYPE 2 DIABETES MELLITUS WITH DIABETIC POLYNEUROPATHY, WITH LONG-TERM CURRENT USE OF INSULIN: ICD-10-CM

## 2018-01-30 LAB
ESTIMATED AVG GLUCOSE: 163 MG/DL
HBA1C MFR BLD HPLC: 7.3 %

## 2018-01-30 PROCEDURE — 99999 PR PBB SHADOW E&M-EST. PATIENT-LVL III: CPT | Mod: PBBFAC,,, | Performed by: FAMILY MEDICINE

## 2018-01-30 PROCEDURE — 99214 OFFICE O/P EST MOD 30 MIN: CPT | Mod: S$GLB,,, | Performed by: FAMILY MEDICINE

## 2018-01-30 PROCEDURE — 83036 HEMOGLOBIN GLYCOSYLATED A1C: CPT

## 2018-01-30 PROCEDURE — 36415 COLL VENOUS BLD VENIPUNCTURE: CPT | Mod: PO

## 2018-01-30 NOTE — PROGRESS NOTES
Chief Complaint   Patient presents with    Diabetes       Cindy Billingsley is a 57 y.o. female who presents per the Chief Complaint.  Pt is known to me and was last seen by me on 11/24/2017.  All known chronic medical issues have been documented.               Diabetes   She presents for her follow-up diabetic visit. She has type 2 diabetes mellitus. Her disease course has been worsening. Pertinent negatives for hypoglycemia include no confusion, dizziness, headaches, mood changes, nervousness/anxiousness, seizures, sleepiness, speech difficulty, sweats or tremors. Associated symptoms include blurred vision, chest pain (mild; resolved), foot paresthesias and visual change. Pertinent negatives for diabetes include no fatigue, no foot ulcerations, no polydipsia, no polyphagia, no polyuria and no weakness. There are no hypoglycemic complications. Diabetic complications include peripheral neuropathy and retinopathy. Pertinent negatives for diabetic complications include no autonomic neuropathy, CVA, heart disease, impotence, nephropathy or PVD. Risk factors for coronary artery disease include diabetes mellitus, hypertension and stress. Current diabetic treatment includes insulin injections and oral agent (monotherapy). She is compliant with treatment most of the time. Her weight is stable. She is following a generally healthy diet. She has not had a previous visit with a dietitian. She participates in exercise intermittently. An ACE inhibitor/angiotensin II receptor blocker is being taken. She does not see a podiatrist.Eye exam is current.   Hypertension   This is a chronic problem. The current episode started more than 1 year ago. The problem has been waxing and waning since onset. The problem is uncontrolled. Associated symptoms include anxiety, blurred vision and chest pain (mild; resolved). Pertinent negatives include no headaches, neck pain, palpitations, peripheral edema, shortness of breath or sweats. There are no  associated agents to hypertension. Risk factors for coronary artery disease include diabetes mellitus, dyslipidemia and post-menopausal state. Past treatments include angiotensin blockers and diuretics. The current treatment provides moderate improvement. Compliance problems include psychosocial issues.  Hypertensive end-organ damage includes retinopathy. There is no history of angina, kidney disease, CAD/MI, CVA, heart failure, left ventricular hypertrophy, PVD or renovascular disease. There is no history of chronic renal disease, coarctation of the aorta, hyperaldosteronism, hypercortisolism, hyperparathyroidism, a hypertension causing med, pheochromocytoma, sleep apnea or a thyroid problem.        ROS  Review of Systems   Constitutional: Negative.  Negative for activity change, appetite change, chills, diaphoresis, fatigue, fever and unexpected weight change.   HENT: Negative.  Negative for congestion, ear pain, hearing loss, nosebleeds, postnasal drip, rhinorrhea, sinus pressure, sneezing, sore throat and trouble swallowing.    Eyes: Positive for blurred vision. Negative for pain and visual disturbance.   Respiratory: Negative for cough, choking and shortness of breath.    Cardiovascular: Positive for chest pain (mild; resolved). Negative for palpitations and leg swelling.   Gastrointestinal: Negative for abdominal pain, constipation, diarrhea, nausea and vomiting.   Endocrine: Negative for polydipsia, polyphagia and polyuria.   Genitourinary: Negative for difficulty urinating, dysuria, frequency, impotence and urgency.   Musculoskeletal: Negative.  Negative for arthralgias, back pain, gait problem, joint swelling, myalgias and neck pain.   Skin: Negative.    Allergic/Immunologic: Negative for environmental allergies and food allergies.   Neurological: Negative.  Negative for dizziness, tingling, tremors, seizures, syncope, speech difficulty, weakness, light-headedness, numbness and headaches.  "  Psychiatric/Behavioral: Negative.  Negative for confusion, decreased concentration, dysphoric mood and sleep disturbance. The patient is not nervous/anxious.        Physical Exam  Vitals:    01/30/18 0754   BP: 130/66   Pulse: 71   Resp: 20   Temp: 98.6 °F (37 °C)    Body mass index is 28.4 kg/m².  Weight: 79.8 kg (175 lb 14.8 oz)   Height: 5' 6" (167.6 cm)     Physical Exam   Constitutional: She is oriented to person, place, and time. She appears well-developed and well-nourished. She is active and cooperative.  Non-toxic appearance. She does not have a sickly appearance. She does not appear ill. No distress.   HENT:   Head: Normocephalic and atraumatic.   Right Ear: Hearing and external ear normal. No decreased hearing is noted.   Left Ear: Hearing and external ear normal. No decreased hearing is noted.   Nose: Nose normal. No rhinorrhea or nasal deformity.   Mouth/Throat: Uvula is midline. She does not have dentures. Normal dentition.   Eyes: Conjunctivae, EOM and lids are normal. Pupils are equal, round, and reactive to light. Right eye exhibits no chemosis, no discharge and no exudate. No foreign body present in the right eye. Left eye exhibits no chemosis, no discharge and no exudate. No foreign body present in the left eye. No scleral icterus.   Neck: Normal range of motion and full passive range of motion without pain. Neck supple.   Cardiovascular: Normal rate, regular rhythm, S1 normal, S2 normal and normal heart sounds.  Exam reveals no gallop and no friction rub.    No murmur heard.  Pulmonary/Chest: Effort normal and breath sounds normal. No accessory muscle usage. No respiratory distress. She has no decreased breath sounds. She has no wheezes. She has no rhonchi. She has no rales.   Abdominal: Soft. Normal appearance. She exhibits no distension. There is no hepatosplenomegaly. There is no tenderness. There is no rigidity, no rebound and no guarding.   Musculoskeletal: Normal range of motion. "   Neurological: She is alert and oriented to person, place, and time. She has normal strength. No cranial nerve deficit or sensory deficit. She exhibits normal muscle tone. She displays no seizure activity. Coordination and gait normal.   Skin: Skin is warm, dry and intact. No rash noted. She is not diaphoretic.   Psychiatric: She has a normal mood and affect. Her speech is normal and behavior is normal. Judgment and thought content normal. Cognition and memory are normal. She is attentive.       Assessment & Plan    1. Type 2 diabetes mellitus with diabetic polyneuropathy, with long-term current use of insulin  Patient is encouraged to follow a diet low in carbohydrates and simple sugars.  Advised to focus on good food choices and increased physical activity and encouraged to adhere to medication regimen and check glucose as recommended daily and contact office if glucose levels are not improving over time.  Screening blood test is due at this time.  Foot exam was not done at this visit.   - Hemoglobin A1c; Future    2. Essential hypertension  Patient was counseled and encouraged to maintain a low sodium diet, as well as increasing physical activity.  Recommend random BP checks at home on a regular basis.  Repeat BP at end of visit was not necessary. Will continue medication at this time, and follow up in 3-6 months, or sooner if blood pressure begins to increase.       3. Mixed hyperlipidemia  We discussed ways to manage cholesterol levels, including increasing whole grain foods and decreasing fried and fatty foods.  I also recommended OTC Omega 3 and Omega 6 supplements to improve overall cholesterol levels.  Will continue current therapy at this visit; patient is not due for screening blood test at this time.       Follow up documented    ACTIVE MEDICAL ISSUES:  Documented in Problem List    PAST MEDICAL HISTORY  Documented    PAST SURGICAL HISTORY:  Documented    SOCIAL HISTORY:  Documented    FAMILY  HISTORY:  Documented    ALLERGIES AND MEDICATIONS: updated and reviewed.  Documented    Health Maintenance       Date Due Completion Date    Hemoglobin A1c 01/18/2018 10/18/2017    Override on 6/26/2015: Done (will do future ,  appoint 06/29/15)    Foot Exam 06/12/2018 6/12/2017    Override on 6/12/2017: Done    Override on 6/26/2015: Done (today)    Eye Exam 06/19/2018 6/19/2017 (Done)    Override on 6/19/2017: Done (Dr Enrique)    Override on 12/16/2016: Done (Dr. Royal)    Override on 4/1/2016: Done (Dr Brown)    Override on 6/26/2015: Declined ( she will go until 08/2015)    Override on 6/24/2014: Declined (had one done)    Lipid Panel 01/15/2019 1/15/2018    Override on 1/12/2015: Done (future)    Pap Smear with HPV Cotest 02/28/2019 2/29/2016    Mammogram 04/17/2019 4/17/2017    TETANUS VACCINE 08/01/2020 8/1/2010    Pneumococcal PPSV23 (Medium Risk) (2) 12/15/2024 8/2/2006    Colonoscopy 11/16/2027 11/16/2017

## 2018-02-06 DIAGNOSIS — E11.42 TYPE 2 DIABETES MELLITUS WITH DIABETIC POLYNEUROPATHY, WITH LONG-TERM CURRENT USE OF INSULIN: ICD-10-CM

## 2018-02-06 DIAGNOSIS — Z79.4 TYPE 2 DIABETES MELLITUS WITH DIABETIC POLYNEUROPATHY, WITH LONG-TERM CURRENT USE OF INSULIN: ICD-10-CM

## 2018-02-06 RX ORDER — INSULIN GLARGINE 300 U/ML
INJECTION, SOLUTION SUBCUTANEOUS
Qty: 12 ML | Refills: 0 | Status: SHIPPED | OUTPATIENT
Start: 2018-02-06 | End: 2018-02-26 | Stop reason: SDUPTHER

## 2018-02-26 ENCOUNTER — OFFICE VISIT (OUTPATIENT)
Dept: ENDOCRINOLOGY | Facility: CLINIC | Age: 58
End: 2018-02-26
Payer: COMMERCIAL

## 2018-02-26 VITALS
HEIGHT: 66 IN | DIASTOLIC BLOOD PRESSURE: 68 MMHG | BODY MASS INDEX: 28.63 KG/M2 | HEART RATE: 62 BPM | SYSTOLIC BLOOD PRESSURE: 122 MMHG | WEIGHT: 178.13 LBS

## 2018-02-26 DIAGNOSIS — E11.42 DIABETIC PERIPHERAL NEUROPATHY: Chronic | ICD-10-CM

## 2018-02-26 DIAGNOSIS — Z79.4 TYPE 2 DIABETES MELLITUS WITH DIABETIC POLYNEUROPATHY, WITH LONG-TERM CURRENT USE OF INSULIN: Primary | ICD-10-CM

## 2018-02-26 DIAGNOSIS — E11.42 TYPE 2 DIABETES MELLITUS WITH DIABETIC POLYNEUROPATHY, WITH LONG-TERM CURRENT USE OF INSULIN: Primary | ICD-10-CM

## 2018-02-26 DIAGNOSIS — E78.2 MIXED HYPERLIPIDEMIA: ICD-10-CM

## 2018-02-26 DIAGNOSIS — I10 ESSENTIAL HYPERTENSION: ICD-10-CM

## 2018-02-26 PROBLEM — R07.9 CHEST PAIN: Status: RESOLVED | Noted: 2018-01-15 | Resolved: 2018-02-26

## 2018-02-26 LAB
CREAT UR-MCNC: 223 MG/DL
MICROALBUMIN UR DL<=1MG/L-MCNC: 182 UG/ML
MICROALBUMIN/CREATININE RATIO: 81.6 UG/MG

## 2018-02-26 PROCEDURE — 99999 PR PBB SHADOW E&M-EST. PATIENT-LVL IV: CPT | Mod: PBBFAC,,, | Performed by: NURSE PRACTITIONER

## 2018-02-26 PROCEDURE — 82043 UR ALBUMIN QUANTITATIVE: CPT

## 2018-02-26 PROCEDURE — 99214 OFFICE O/P EST MOD 30 MIN: CPT | Mod: S$GLB,,, | Performed by: NURSE PRACTITIONER

## 2018-02-26 PROCEDURE — 3008F BODY MASS INDEX DOCD: CPT | Mod: S$GLB,,, | Performed by: NURSE PRACTITIONER

## 2018-02-26 RX ORDER — INSULIN GLARGINE 300 [IU]/ML
54 INJECTION, SOLUTION SUBCUTANEOUS DAILY
Qty: 12 ML | Refills: 3 | Status: SHIPPED | OUTPATIENT
Start: 2018-02-26 | End: 2018-05-29

## 2018-02-26 NOTE — PROGRESS NOTES
CC: Ms. Cindy Billingsley arrives today for management of Type 2 and review of chronic medical conditions. She arrives alone today.     HPI: Ms. Cindy Billingsley was diagnosed with type II DM in 1996 . Seen by Mariaelena Page in endocrine clinic 12/10/12. She was diagnosed diabetes in 1996 during routine physical exam. Prior to that had gestational diabetes with her first child at the age of 28. She started treatment on Glucophage - eventually Glucotrol was added. Lantus insulin was started ~ 2006 and she started on Novolog in March of 2012. Previously used Victoza in 2012 - 2013 but d/c r/t cost. DM then followed by Dr. Lombard in family medicine. In 3/15 when A1c 9.7% converted to Bydureon, next A1c trended down to 7.4%. Seen by ISHMAEL Holley NP in 3/2016, last seen by ANN Traylor NP in 6/16. Seen last month in urgent care.     a1c has improved 7.3%.  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    CURRENT DIABETIC MEDS: Metformin 1000 mg AM (max tolerated), Humalog 10 units ac w/ mod correction scale 150-200 +2 prn, and toujeo 48 units AM, victoza 1.2mg QD    Uses Vials and Pens:comfortable with insulin injections, rotating sites in abd.   Type of Glucose Meter: ReliOn Prime  BG readings are checked 3-4 x/day with ReliOn Prime  Brought glucometer to clinic, manual review of meter reveals BG readings are   AM readings when off work: 174-225  1-2pm readings when wake up: 267,159, 196  6pm readings before work 163-202  Bedtime: 192  0200 190, 222     Hypoglycemia:  No hypoglycemia.  Feels s/s when BG <100, including shaky, disoriented, sweaty, tx with hard candy/ sucker, cold drink.  85 mg/dl- felt s/s above     Exercise: 1 hour 3x a day-started in May 2016    Dietary Habits: 2 meals day, + snacking at night on chicken salad sandwich, crackers, apple juice; drinks water, 1-2 cups coffee at work; eating more at home lately    Polyuria: No  Polydipsia: No  Polyphagia: No    Last Eye  "Exam: 2016 (march) Dr Knox   Has not had a podiatry exam    REVIEW OF SYSTEMS  Personally reviewed past medical, social, and family history.     General: + fatigue related to work / stress. Denies changes in weight; denies fever.   Eyes:  Denies blurry vision, wears glasses. denies eye pain or redness.  Cardiac: no palpitations; chest pain.  Respiratory: no cough or dyspnea  GI: Good appetite, no nausea, vomit.  Skin: no rashes, itching, dryness, or color changes.   Neuro: c/o Intermittent parethesias to bilateral feet; L great toe pain at nail bed    Vital Signs  Personally reviewed the labs noted below.     /68   Pulse 62   Ht 5' 6" (1.676 m)   Wt 80.8 kg (178 lb 1.6 oz)   LMP 03/03/2016 (LMP Unknown)   BMI 28.75 kg/m²     Hemoglobin A1C   Date Value Ref Range Status   01/30/2018 7.3 (H) 4.0 - 5.6 % Final     Comment:     According to ADA guidelines, hemoglobin A1c <7.0% represents  optimal control in non-pregnant diabetic patients. Different  metrics may apply to specific patient populations.   Standards of Medical Care in Diabetes-2016.  For the purpose of screening for the presence of diabetes:  <5.7%     Consistent with the absence of diabetes  5.7-6.4%  Consistent with increasing risk for diabetes   (prediabetes)  >or=6.5%  Consistent with diabetes  Currently, no consensus exists for use of hemoglobin A1c  for diagnosis of diabetes for children.  This Hemoglobin A1c assay has significant interference with fetal   hemoglobin   (HbF). The results are invalid for patients with abnormal amounts of   HbF,   including those with known Hereditary Persistence   of Fetal Hemoglobin. Heterozygous hemoglobin variants (HbAS, HbAC,   HbAD, HbAE, HbA2) do not significantly interfere with this assay;   however, presence of multiple variants in a sample may impact the %   interference.     10/18/2017 7.2 (H) 4.0 - 5.6 % Final     Comment:     According to ADA guidelines, hemoglobin A1c <7.0% " represents  optimal control in non-pregnant diabetic patients. Different  metrics may apply to specific patient populations.   Standards of Medical Care in Diabetes-2016.  For the purpose of screening for the presence of diabetes:  <5.7%     Consistent with the absence of diabetes  5.7-6.4%  Consistent with increasing risk for diabetes   (prediabetes)  >or=6.5%  Consistent with diabetes  Currently, no consensus exists for use of hemoglobin A1c  for diagnosis of diabetes for children.  This Hemoglobin A1c assay has significant interference with fetal   hemoglobin   (HbF). The results are invalid for patients with abnormal amounts of   HbF,   including those with known Hereditary Persistence   of Fetal Hemoglobin. Heterozygous hemoglobin variants (HbAS, HbAC,   HbAD, HbAE, HbA2) do not significantly interfere with this assay;   however, presence of multiple variants in a sample may impact the %   interference.     06/12/2017 7.5 (H) 4.0 - 5.6 % Final     Comment:     According to ADA guidelines, hemoglobin A1c <7.0% represents  optimal control in non-pregnant diabetic patients. Different  metrics may apply to specific patient populations.   Standards of Medical Care in Diabetes-2016.  For the purpose of screening for the presence of diabetes:  <5.7%     Consistent with the absence of diabetes  5.7-6.4%  Consistent with increasing risk for diabetes   (prediabetes)  >or=6.5%  Consistent with diabetes  Currently, no consensus exists for use of hemoglobin A1c  for diagnosis of diabetes for children.  This Hemoglobin A1c assay has significant interference with fetal   hemoglobin   (HbF). The results are invalid for patients with abnormal amounts of   HbF,   including those with known Hereditary Persistence   of Fetal Hemoglobin. Heterozygous hemoglobin variants (HbAS, HbAC,   HbAD, HbAE, HbA2) do not significantly interfere with this assay;   however, presence of multiple variants in a sample may impact the %    interference.         Chemistry        Component Value Date/Time     01/15/2018 1130    K 3.6 01/15/2018 1130     01/15/2018 1130    CO2 30 (H) 01/15/2018 1130    BUN 16 01/15/2018 1130    CREATININE 0.9 01/15/2018 1130     (H) 01/15/2018 1130        Component Value Date/Time    CALCIUM 9.9 01/15/2018 1130    ALKPHOS 124 01/15/2018 1130    AST 16 01/15/2018 1130    ALT 19 01/15/2018 1130    BILITOT 0.7 01/15/2018 1130        Lab Results   Component Value Date    CHOL 191 01/15/2018    CHOL 174 02/03/2017    CHOL 208 (H) 06/07/2016     Lab Results   Component Value Date    HDL 47 01/15/2018    HDL 35 (L) 02/03/2017    HDL 46 06/07/2016     Lab Results   Component Value Date    LDLCALC 115.8 01/15/2018    LDLCALC 96.0 02/03/2017    LDLCALC 138.2 06/07/2016     Lab Results   Component Value Date    TRIG 141 01/15/2018    TRIG 215 (H) 02/03/2017    TRIG 119 06/07/2016     Lab Results   Component Value Date    TSH 0.951 01/15/2018     Lab Results   Component Value Date    MICALBCREAT 91.3 (H) 03/14/2016     PE:  GENERAL: Well developed, well nourished.  PSYCH: AAOx3, appropriate mood and affect, pleasant expression, conversant, appears relaxed, well groomed.   EYES: Conjunctiva, corneas clear, no lid lag, EOM intact.  NECK: Supple, trachea midline, FROM  CHEST: Resp even and unlabored  ABDOMEN: Soft, non-tender, non-distended  VASCULAR: Same temperature peripherally to centrally, no edema, brisk capillary refill BUE.  NEURO: Gait steady.  SKIN: Normal skin turgor. Skin warm and dry. No areas of breakdown, no acanthosis nigracans.  FEET: Footware appropriate.      Assessment/Plan  1. Type II diabetes mellitus with neurological manifestations, uncontrolled ; long term use of insulin  Hemoglobin A1c    - Discussed DM, progression of disease, long term complications and tx options including MDI vs. Vgo. Reviewed A1c and BG goals. Continue metformin AM only (max dose tolerated related to GI distress).    Change Toujeo 54 units daily, Continue humalog 10 units ac w/ mod dose correction scale 150-200 +2, etc.   Increase Victoza to 1.8mg QD, eRx sent.   - investigate VGo - consider start VGo 20 with 4 clicks AC.     bg monitoring ac/hs.   Reviewed insulins' onset, peak, duration, dosing, administration, site rotation. Reviewed hypoglycemia, s/s and appropriate tx options. Instructed to monitor BG ac/hs, bring logs/ meter to clinic visits.   - takes ASA, ARB; statin     2. Diabetic peripheral neuropathy  Optimize DM control, no Rx at present.   3. Hypertension associated with diabetes  Continue meds as previously prescribed and monitor   4. Combined hyperlipidemia associated with type 2 diabetes mellitus  Uncontrolled, near goal; on statin therapy; LFTs WNL; reviewed low chol/ low fat diet  - consider lipitor to 80mg QD but will defer to cardiology   5. Diabetic retinopathy associated with type 2 diabetes mellitus, without macular edema, with unspecified retinopathy  Need better BG control, she will reschedule eye exam appt with outside eye MD     Orders Placed This Encounter   Procedures    Hemoglobin A1c    Microalbumin/creatinine urine ratio       FOLLOW UP in 4 months   labs prior to appt at [facility]  Urine mcr today

## 2018-02-27 ENCOUNTER — OFFICE VISIT (OUTPATIENT)
Dept: CARDIOLOGY | Facility: CLINIC | Age: 58
End: 2018-02-27
Payer: COMMERCIAL

## 2018-02-27 VITALS
RESPIRATION RATE: 15 BRPM | SYSTOLIC BLOOD PRESSURE: 135 MMHG | HEART RATE: 84 BPM | DIASTOLIC BLOOD PRESSURE: 75 MMHG | BODY MASS INDEX: 28.81 KG/M2 | WEIGHT: 179.25 LBS | OXYGEN SATURATION: 97 % | HEIGHT: 66 IN

## 2018-02-27 DIAGNOSIS — I10 ESSENTIAL HYPERTENSION: ICD-10-CM

## 2018-02-27 DIAGNOSIS — Z79.4 TYPE 2 DIABETES MELLITUS WITH DIABETIC POLYNEUROPATHY, WITH LONG-TERM CURRENT USE OF INSULIN: ICD-10-CM

## 2018-02-27 DIAGNOSIS — R07.9 CHEST PAIN, UNSPECIFIED TYPE: Primary | ICD-10-CM

## 2018-02-27 DIAGNOSIS — E11.42 TYPE 2 DIABETES MELLITUS WITH DIABETIC POLYNEUROPATHY, WITH LONG-TERM CURRENT USE OF INSULIN: ICD-10-CM

## 2018-02-27 DIAGNOSIS — E78.2 MIXED HYPERLIPIDEMIA: ICD-10-CM

## 2018-02-27 DIAGNOSIS — F41.9 ANXIETY: ICD-10-CM

## 2018-02-27 PROCEDURE — 3008F BODY MASS INDEX DOCD: CPT | Mod: S$GLB,,, | Performed by: INTERNAL MEDICINE

## 2018-02-27 PROCEDURE — 99999 PR PBB SHADOW E&M-EST. PATIENT-LVL III: CPT | Mod: PBBFAC,,, | Performed by: INTERNAL MEDICINE

## 2018-02-27 PROCEDURE — 99214 OFFICE O/P EST MOD 30 MIN: CPT | Mod: S$GLB,,, | Performed by: INTERNAL MEDICINE

## 2018-02-27 NOTE — PROGRESS NOTES
CARDIOVASCULAR PROGRESS NOTE    REASON FOR CONSULT:   Cindy Billingsley is a 57 y.o. female who presents for f/u of HTN, CP.    PCP: Lombard  HISTORY OF PRESENT ILLNESS:   The patient returns for follow-up.  She reports no further chest pain, nor any dyspnea.  There's been no palpitations, lightheadedness, dizziness, or syncope.  She denies PND, orthopnea, or lower extremity edema.  There's been no melena, hematuria, or claudicant symptoms.  She does report continued anxiety.  I have again encouraged the patient to follow-up with her primary care physician, or consider psychiatry/psychology referral.  The patient has some resistance to this that she is worried that a psychiatric diagnosis and somebody who works in psychiatry may be adverse to her future career.  I suggested that she has not been high, and that anxiety may be adversely affecting other aspects of her health.  She also has not yet enrolled in the digital hypertension program, and I've encouraged her to follow-up with the on boarding process.  The patient is been monitoring her blood pressure at home, and her systolic pressures run in the 120-140 range.    CARDIOVASCULAR HISTORY:   none    PAST MEDICAL HISTORY:     Past Medical History:   Diagnosis Date    Anxiety     Arthritis     Depression     Diabetes mellitus type II     Diabetic peripheral neuropathy 9/10/2012    Facial spasm - right side 9/10/2012    History of irregular menstrual bleeding     Hypertension     Knee pain        PAST SURGICAL HISTORY:     Past Surgical History:   Procedure Laterality Date    breast reduction      COLONOSCOPY N/A 11/16/2017    Procedure: COLONOSCOPY;  Surgeon: Alan Acosta MD;  Location: [facility];  Service: Endoscopy;  Laterality: N/A;  confirmed appt    TUBAL LIGATION         ALLERGIES AND MEDICATION:     Review of patient's allergies indicates:   Allergen Reactions    Amlodipine      Other reaction(s): Edema    Pristiq  [desvenlafaxine]      Other  "reaction(s): Nausea     Previous Medications    AMLODIPINE (NORVASC) 5 MG TABLET    Take 1 tablet (5 mg total) by mouth once daily.    ASPIRIN (ADULT ASPIRIN EC LOW STRENGTH) 81 MG EC TABLET    Take 81 mg by mouth. 1 Tablet, Delayed Release (E.C.) Oral Every day    ATORVASTATIN (LIPITOR) 40 MG TABLET    Take 1 tablet (40 mg total) by mouth every evening.    BLOOD SUGAR DIAGNOSTIC (BLOOD GLUCOSE TEST) STRP    One touch    BLOOD-GLUCOSE METER KIT    Use as instructed    INSULIN GLARGINE, TOUJEO, (TOUJEO SOLOSTAR U-300 INSULIN) 300 UNIT/ML (1.5 ML) INPN PEN    Inject 54 Units into the skin once daily.    INSULIN LISPRO (HUMALOG KWIKPEN) 100 UNIT/ML INPN PEN    Inject 10 units w/ each meal plus scale 150-200 +2, 201-250 +4, 251-300 +6, 301-350 +8, >350 +10.    INSULIN SYRINGE-NEEDLE U-100 1/2 ML 30 X 5/16" SYRG        LANCETS (MICROLET LANCET) MISC    Inject 1 each into the skin 3 (three) times daily.    LIRAGLUTIDE 0.6 MG/0.1 ML, 18 MG/3 ML, SUBQ PNIJ (VICTOZA 3-ASIYA) 0.6 MG/0.1 ML (18 MG/3 ML) PNIJ    Inject 0.6 mg into the skin once daily.    METFORMIN (GLUCOPHAGE) 1000 MG TABLET    Take 1 tablet (1,000 mg total) by mouth 2 (two) times daily with meals.    METOPROLOL SUCCINATE (TOPROL-XL) 100 MG 24 HR TABLET    Take 1 tablet (100 mg total) by mouth once daily.    MULTIVITAMIN (MULTIVITAMIN) PER TABLET    Take 1 tablet by mouth once daily.      PANTOPRAZOLE (PROTONIX) 40 MG TABLET    Take 1 tablet (40 mg total) by mouth once daily.    PEN NEEDLE, DIABETIC (PEN NEEDLE) 31 GAUGE X 5/16" NDLE    USE ONE  SUBCUTANEOUSLY 4 TIMES DAILY    VALSARTAN-HYDROCHLOROTHIAZIDE (DIOVAN-HCT) 320-25 MG PER TABLET    TAKE ONE TABLET BY MOUTH ONCE DAILY    VITAMIN E 400 UNIT CAPSULE    Take 400 Units by mouth once daily.      ZOLPIDEM (AMBIEN) 5 MG TAB    Take 1 tablet (5 mg total) by mouth every evening.       SOCIAL HISTORY:     Social History     Social History    Marital status: Single     Spouse name: N/A    Number of children: N/A "    Years of education: N/A     Occupational History    RN [employer]     Social History Main Topics    Smoking status: Former Smoker     Quit date: 1/9/1990    Smokeless tobacco: Never Used      Comment: patient quit in 1990    Alcohol use 0.0 oz/week      Comment: Occasional    Drug use: No    Sexual activity: Not Currently     Birth control/ protection: None, Rhythm     Other Topics Concern    Are You Pregnant Or Think You May Be? No    Breast-Feeding No     Social History Narrative    Works night shift 7p - 7a at [employer] 3 x week; enrolled in [employer]       FAMILY HISTORY:     Family History   Problem Relation Age of Onset    Heart disease Mother     Hypertension Mother     Diabetes Father     Breast cancer Neg Hx     Colon cancer Neg Hx     Stroke Neg Hx     Ovarian cancer Neg Hx     Melanoma Neg Hx     Cervical cancer Neg Hx     Endometrial cancer Neg Hx     Vaginal cancer Neg Hx        REVIEW OF SYSTEMS:   Review of Systems   Constitutional: Negative for chills, diaphoresis and fever.   HENT: Negative for nosebleeds.    Eyes: Negative for blurred vision, double vision and photophobia.   Respiratory: Negative for hemoptysis, shortness of breath and wheezing.    Cardiovascular: Negative for chest pain, palpitations, orthopnea, claudication, leg swelling and PND.   Gastrointestinal: Negative for abdominal pain, blood in stool, heartburn, melena, nausea and vomiting.   Genitourinary: Negative for flank pain and hematuria.   Musculoskeletal: Negative for falls, myalgias and neck pain.   Skin: Negative for rash.   Neurological: Negative for dizziness, seizures, loss of consciousness, weakness and headaches.   Endo/Heme/Allergies: Negative for polydipsia. Does not bruise/bleed easily.   Psychiatric/Behavioral: Negative for depression and memory loss. The patient is nervous/anxious.        PHYSICAL EXAM:     Vitals:    02/27/18 0945   BP: 135/75   Pulse: 84   Resp: 15    Body  "mass index is 28.93 kg/m².  Weight: 81.3 kg (179 lb 3.7 oz)   Height: 5' 6" (167.6 cm)     Physical Exam   Constitutional: She is oriented to person, place, and time. She appears well-developed and well-nourished. She is cooperative.  Non-toxic appearance. No distress.   HENT:   Head: Normocephalic and atraumatic.   Eyes: Conjunctivae and EOM are normal. Pupils are equal, round, and reactive to light. No scleral icterus.   Neck: Trachea normal. Neck supple. Normal carotid pulses and no JVD present. Carotid bruit is not present. No neck rigidity. No edema present. No thyromegaly present.   Cardiovascular: Normal rate, regular rhythm, S1 normal and S2 normal.  PMI is not displaced.  Exam reveals no gallop and no friction rub.    No murmur heard.  Pulses:       Carotid pulses are 2+ on the right side, and 2+ on the left side.  Pulmonary/Chest: Effort normal and breath sounds normal. No respiratory distress. She has no wheezes. She has no rales. She exhibits no tenderness.   Abdominal: Soft. Bowel sounds are normal. She exhibits no distension and no mass. There is no hepatosplenomegaly. There is no tenderness.   Musculoskeletal: She exhibits no edema or tenderness.   Feet:   Right Foot:   Skin Integrity: Negative for ulcer.   Left Foot:   Skin Integrity: Negative for ulcer.   Neurological: She is alert and oriented to person, place, and time. No cranial nerve deficit.   Skin: Skin is warm and dry. No rash noted. No erythema.   Psychiatric: She has a normal mood and affect. Her speech is normal and behavior is normal.   Vitals reviewed.      DATA:   EKG: (personally reviewed tracing)  1/15/18 SR 92    Laboratory:  CBC:    Recent Labs  Lab 02/03/17  0740 12/12/17  0950 01/15/18  1130   WHITE BLOOD CELL COUNT 8.46 7.99 7.60   HEMOGLOBIN 12.9 12.7 13.1   HEMATOCRIT 38.9 38.2 39.1   PLATELETS 281 246 248       CHEMISTRIES:    Recent Labs  Lab 02/03/17  0740 12/12/17  0950 01/15/18  1130   GLUCOSE 225 H 136 H 210 H   SODIUM " 138 143 142   POTASSIUM 4.4 4.1 3.6   BUN BLD 18 18 16   CREATININE 1.0 0.9 0.9   EGFR IF AFR[place] AMERICAN >60.0 >60.0 >60   EGFR IF NON- >60.0 >60.0 >60   CALCIUM 9.2 9.8 9.9       CARDIAC BIOMARKERS:    Recent Labs  Lab 01/15/18  1130 01/15/18  1555 01/15/18  1740   CPK  --  139 128   CPK MB  --  1.4 1.3   TROPONIN I <0.006 <0.006 0.008       COAGS:    Recent Labs  Lab 06/07/16  0800 12/12/17  0950 01/15/18  1130   INR 1.0 1.1 1.0       LIPIDS/LFTS:    Recent Labs  Lab 06/07/16  0801 02/03/17  0740 12/12/17  0950 01/15/18  1130 01/15/18  1835   CHOLESTEROL 208 H 174  --   --  191   TRIGLYCERIDES 119 215 H  --   --  141   HDL 46 35 L  --   --  47   LDL CHOLESTEROL 138.2 96.0  --   --  115.8   NON-HDL CHOLESTEROL 162 139  --   --  144   AST 18 19 20 16  --    ALT 18 17 19 19  --        The 10-year ASCVD risk score ([place] FERRIS Jr., et al., 2013) is: 17.3%    Values used to calculate the score:      Age: 57 years      Sex: Female      Is Non- : Yes      Diabetic: Yes      Tobacco smoker: No      Systolic Blood Pressure: 135 mmHg      Is BP treated: Yes      HDL Cholesterol: 47 mg/dL      Total Cholesterol: 191 mg/dL      Cardiovascular Testing:  Echo 1/15/18    1 - Normal left ventricular systolic function (EF 55-60%).     2 - Concentric remodeling.     3 - Impaired LV relaxation, elevated LAP (grade 2 diastolic dysfunction).     ASSESSMENT:   # recent ER CP eval  # HTN, reasonably controlled  # DM  # HLP, on atorva 40mg  # anxiety    PLAN:   Cont med rx  Ex MPI  Check lipids/LFT 1 month (late march 2018)  Pt encouraged to f/u with PCP/psych for eval/tx of anxiety d/o  Diet/exercise/weight loss, Na+ avoidance  Enroll in digital HTN program.  RTC 1 month    Milton Boston MD, FACC

## 2018-03-02 ENCOUNTER — PATIENT MESSAGE (OUTPATIENT)
Dept: ADMINISTRATIVE | Facility: OTHER | Age: 58
End: 2018-03-02

## 2018-03-05 ENCOUNTER — PATIENT OUTREACH (OUTPATIENT)
Dept: OTHER | Facility: OTHER | Age: 58
End: 2018-03-05

## 2018-03-05 DIAGNOSIS — F51.02 INSOMNIA DUE TO STRESS: ICD-10-CM

## 2018-03-05 DIAGNOSIS — Z79.4 TYPE 2 DIABETES MELLITUS WITH DIABETIC POLYNEUROPATHY, WITH LONG-TERM CURRENT USE OF INSULIN: ICD-10-CM

## 2018-03-05 DIAGNOSIS — E11.42 TYPE 2 DIABETES MELLITUS WITH DIABETIC POLYNEUROPATHY, WITH LONG-TERM CURRENT USE OF INSULIN: ICD-10-CM

## 2018-03-05 NOTE — PROGRESS NOTES
"Ms. Cindy Billingsley is a 57 y.o. female who is newly enrolled in the [facility] Medicine Hypertension Clinic.     The following information was reviewed/updated:  Preferred pharmacy   [facility] [address]  [address] [place] [address]  Phone: [phone] Fax: [phone]    [facility] - [place] [address]  Phone: [phone] Fax: [phone]    [facility] - [place] [address]  Phone: [phone] Fax: [phone]    Patient prefers a 30 days supply    Review of patient's allergies indicates:   Allergen Reactions    Pristiq  [desvenlafaxine]      Other reaction(s): Nausea     Current Outpatient Prescriptions on File Prior to Visit   Medication Sig Dispense Refill    amLODIPine (NORVASC) 5 MG tablet Take 1 tablet (5 mg total) by mouth once daily. 90 tablet 3    aspirin (ADULT ASPIRIN EC LOW STRENGTH) 81 MG EC tablet Take 81 mg by mouth. 1 Tablet, Delayed Release (E.C.) Oral Every day      atorvastatin (LIPITOR) 40 MG tablet Take 1 tablet (40 mg total) by mouth every evening. 90 tablet 3    blood sugar diagnostic (BLOOD GLUCOSE TEST) Strp One touch (Patient taking differently: 3 (three) times daily. One touch) 100 strip 11    blood-glucose meter kit Use as instructed 1 each 11    insulin glargine, TOUJEO, (TOUJEO SOLOSTAR U-300 INSULIN) 300 unit/mL (1.5 mL) InPn pen Inject 54 Units into the skin once daily. 12 mL 3    insulin lispro (HUMALOG KWIKPEN) 100 unit/mL InPn pen Inject 10 units w/ each meal plus scale 150-200 +2, 201-250 +4, 251-300 +6, 301-350 +8, >350 +10. 3 Box 2    insulin syringe-needle U-100 1/2 mL 30 x 5/16" Syrg       lancets (MICROLET LANCET) Misc Inject 1 each into the skin 3 (three) times daily. 100 each 11    liraglutide 0.6 mg/0.1 mL, 18 mg/3 mL, subq PNIJ (VICTOZA 3-ASIYA) 0.6 mg/0.1 mL " "(18 mg/3 mL) PnIj Inject 0.6 mg into the skin once daily. 3 mL 6    metformin (GLUCOPHAGE) 1000 MG tablet Take 1 tablet (1,000 mg total) by mouth 2 (two) times daily with meals. (Patient taking differently: Take 1,000 mg by mouth daily with breakfast. ) 180 tablet 2    metoprolol succinate (TOPROL-XL) 100 MG 24 hr tablet Take 1 tablet (100 mg total) by mouth once daily. 90 tablet 3    multivitamin (MULTIVITAMIN) per tablet Take 1 tablet by mouth once daily.        pantoprazole (PROTONIX) 40 MG tablet Take 1 tablet (40 mg total) by mouth once daily. 30 tablet 1    pen needle, diabetic (PEN NEEDLE) 31 gauge x 5/16" Ndle USE ONE  SUBCUTANEOUSLY 4 TIMES DAILY 150 each 6    valsartan-hydrochlorothiazide (DIOVAN-HCT) 320-25 mg per tablet TAKE ONE TABLET BY MOUTH ONCE DAILY 90 tablet 1    vitamin E 400 UNIT capsule Take 400 Units by mouth once daily.        zolpidem (AMBIEN) 5 MG Tab Take 1 tablet (5 mg total) by mouth every evening. 30 tablet 2     No current facility-administered medications on file prior to visit.        Responses to the depression screening suggest patient is having depressive symptoms. Patient is not followed for this and is interested in referral.  Sent message to PCP; patient reports more anxiety than depressive symptoms.     Milton Boston MD indicated he would like to see patient / have patient  referred to a specialist for further evaluation.     MONIQUE screening results for this patient suggest a high likelihood of sleep apnea, which can contribute to hypertension. Patient has been previously diagnosed with sleep apnea and is interested in referral at this time. Message sent to sleep clinic staff.    Milton Boston MD indicated he would like to see patient / have patient  referred to a specialist for further evaluation.     Reviewed non-pharmacologic therapies and impact on BP:  **Patient is very interested in lifestyle modifications. Her goal is to decrease BP medications.**  1. " Low-sodium diet- 11 mmHg reduction 2-4 weeks. I have reviewed [facility].A.S.[facility] diet sodium restrictions (<2000mg/day) Trying to track sodium intake and read labels but needs assistance. Has questions about juicing.  2. Exercise- 7 mmHg reduction 4-12 weeks. I have recommended patient engage in exercise as tolerated at least 30 minutes 5x per week to improve cardiovascular health. Beginning to walk. Walked 30 minutes today. Wants suggestions for exercise routine.   3. Alcohol intake- 3 mmHg reduction 4-12 weeks. I have discussed with patient the maximum recommended number of 1 drink(s) per day for women. Occasional intake.    Explained that we expect patient to obtain several blood pressures per week at random times of day.   Our goal is to get  BP to consistently below 130/80mmHg and make the process convenient so patient can avoid extra trips to the office. Getting your blood pressure below 130/80mmHg (definition of control) will reduce your risk for heart attack, kidney failure, stroke and death (as well as kidney failure, eye disease, & dementia).     Patient is meeting the goal already.   When asked what the patient thinks is causing BP to be elevated, she states: anxiety, lack of exercise, diet    Instructed patient not to allow anyone else to use phone and BP cuff.   I'm not available for emergencies. Patient will call [facility] on-call ([phone] or [phone]) or [phone] if needed.     Discussed appropriate BP measuring technique:  Before taking your blood pressure  Find a quiet place. You will need to listen for your heartbeat.  Roll up the sleeve on your left arm or remove any tight-sleeved clothing, if needed. (It's best to take your blood pressure from your left arm if you are right-handed.You can use the other arm if you have been told by your health care provider to do so.)  Rest in a chair next to a table for 5 to 10 minutes. (Your left arm should rest comfortably at heart level.)  Sit up straight with your  back against the chair, legs uncrossed and on the ground.  Rest your forearm on the table with the palm of your hand facing up.  You should not talk, read the newspaper, or watch television during this process.      Patient and I agreed that she will continue to monitor blood pressure and sodium intake, and continue to remain adherent to medications.     I will plan to follow-up with the patient in 3 weeks.   Emailed patient link to [facility]'s HTN webpage and my contact information in case she has any questions.       Last 5 Patient Entered Readings                                      Current 30 Day Average: 124/78     Recent Readings 3/6/2018 3/5/2018 3/4/2018 3/3/2018 3/3/2018    SBP (mmHg) 119 118 129 117 137    DBP (mmHg) 78 74 81 78 89    Pulse 76 81 86 79 85

## 2018-03-05 NOTE — LETTER
Erendira Sorto, PharmD  [address] [place] [address]     Dear Cindy Billingsley,    Welcome to the [facility] Hypertension Digital Medicine Program!         My name is Erendira Sorto PharmD and I am your dedicated Digital Medicine clinician.  As an expert in medication management, I will help ensure that the medications you are taking continue to provide you with the intended benefits.      I am Estela Quinteros and I will be your health  for the duration of the program.  My  job is to help you identify lifestyle changes to improve your blood pressure control.  We will talk about nutrition, exercise, and other ways that you may be able to adjust your current habits to better your health. Together, we will work to improve your overall health and encourage you to meet your goals for a healthier lifestyle.    What we expect from YOU:    You will need to take blood pressure readings multiple times a week and no less than one reading per week.   It is important that you take your measurements at different times during the day, when possible.     What you should expect from your Digital Medicine Care Team:   We will provide you with education about high blood pressure, including lifestyle changes that could help you to control your blood pressure.   We will review your weekly readings and provide you with monthly blood pressure progress reports after you have been in the program for more than 30 days.   We will send monthly progress reports on your blood pressure control to your physician so they can follow along with your progress as well.    You will be able to reach me by phone at [phone] or through your [facility] account by clicking my name under Care Team on the right side of the home screen.    I look forward to working with you to achieve your blood pressure goals!    Sincerely,    Erendira Sorto PharmD  Your personal clinician    Please visit  [email or web address] to learn more about high blood pressure and what you can do lower your blood pressure.                                                                                           Cindy Billingsley  [address] [place] [address]

## 2018-03-07 ENCOUNTER — PATIENT MESSAGE (OUTPATIENT)
Dept: ENDOCRINOLOGY | Facility: CLINIC | Age: 58
End: 2018-03-07

## 2018-03-07 ENCOUNTER — TELEPHONE (OUTPATIENT)
Dept: FAMILY MEDICINE | Facility: CLINIC | Age: 58
End: 2018-03-07

## 2018-03-07 RX ORDER — METFORMIN HYDROCHLORIDE 1000 MG/1
1000 TABLET ORAL
Start: 2018-03-07 | End: 2018-06-21

## 2018-03-07 NOTE — TELEPHONE ENCOUNTER
----- Message from Erendira Sorto PharmD sent at 3/7/2018  9:11 AM CST -----  Patient screened positive on depression questionnaire for hypertension digital medicine program. She reports anxiety and depression symptoms and would like to address with Dr. Lombard.

## 2018-03-08 ENCOUNTER — OFFICE VISIT (OUTPATIENT)
Dept: SLEEP MEDICINE | Facility: CLINIC | Age: 58
End: 2018-03-08
Payer: COMMERCIAL

## 2018-03-08 VITALS
OXYGEN SATURATION: 98 % | RESPIRATION RATE: 16 BRPM | SYSTOLIC BLOOD PRESSURE: 158 MMHG | WEIGHT: 181.19 LBS | HEIGHT: 66 IN | HEART RATE: 92 BPM | DIASTOLIC BLOOD PRESSURE: 84 MMHG | BODY MASS INDEX: 29.12 KG/M2 | TEMPERATURE: 98 F

## 2018-03-08 DIAGNOSIS — G47.33 OSA (OBSTRUCTIVE SLEEP APNEA): Primary | ICD-10-CM

## 2018-03-08 DIAGNOSIS — E66.3 OVERWEIGHT (BMI 25.0-29.9): ICD-10-CM

## 2018-03-08 DIAGNOSIS — G47.26 SHIFT WORK SLEEP DISORDER: ICD-10-CM

## 2018-03-08 DIAGNOSIS — I10 ESSENTIAL HYPERTENSION: ICD-10-CM

## 2018-03-08 DIAGNOSIS — F41.9 ANXIETY: ICD-10-CM

## 2018-03-08 PROCEDURE — 99999 PR PBB SHADOW E&M-EST. PATIENT-LVL V: CPT | Mod: PBBFAC,,, | Performed by: NURSE PRACTITIONER

## 2018-03-08 PROCEDURE — 99203 OFFICE O/P NEW LOW 30 MIN: CPT | Mod: S$GLB,,, | Performed by: NURSE PRACTITIONER

## 2018-03-08 RX ORDER — ZOLPIDEM TARTRATE 5 MG/1
TABLET ORAL
Qty: 30 TABLET | Refills: 2 | Status: SHIPPED | OUTPATIENT
Start: 2018-03-08 | End: 2018-07-16 | Stop reason: SDUPTHER

## 2018-03-08 NOTE — PROGRESS NOTES
CHIEF COMPLAINT:    Chief Complaint   Patient presents with    Insomnia     Nights 12 hour 3 shif  HISTORY OF PRESENT ILLNESS: Cindy Billingsley is a 57 y.o. female with DM type II, hypertension, hyperlipidemia, GERD, mild MONIQUE is here for sleep evaluation.   She was diagnosed with mild MONIQUE in 2006 (RDI 7.5) at [facility] but opted for no treatment at the time. She presents today to get reevaluated because of poor sleep  quality. She reports unrestful sleep. 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 She sleeps for 3-4 hours and unable to go back to sleep. She keeps the room very dark. She will take a 1-2 hour nap later on during the day. She presents today a bit upset that her pressure was elevated but she had just worked a 12 hour shift and skipped her meal and only ate a cracker. She reports work stress and plans to follow up with her PCP to manage this.       SLEEP ROUTINE:   Bed partners in past have complained of snoring  Time to bed:  10 am takes ambien  Lights off:  Protective covering  Sleep onset latency:  > 30 minutes sometimes 1 hour        Disruptions or awakenings:    Twice a night bathroom, usual more 30 min go back    Wakeup time:      2-3 pm   Perceived sleep quality:  poor      Daytime naps:      1 nap -1 -2 hour varies  Weekend sleep routine:      same  Caffeine use: no  exercise habit:  Started     PAST MEDICAL HISTORY:    Active Ambulatory Problems     Diagnosis Date Noted    Type 2 diabetes mellitus with diabetic polyneuropathy, with long-term current use of insulin 07/20/2012    Insomnia due to stress 07/20/2012    Diabetic peripheral neuropathy 09/10/2012    Facial spasm - right side 09/10/2012    Essential hypertension 01/23/2013    Mixed hyperlipidemia 01/23/2013    GERD (gastroesophageal reflux disease) 07/09/2013    Gout 08/20/2014    Long-term current use of insulin for diabetes mellitus 11/12/2015    Primary osteoarthritis of both  knees 09/09/2016     Resolved Ambulatory Problems     Diagnosis Date Noted    Knee pain     Osteoarthritis of shoulder region 07/20/2012    Abdominal pain, epigastric 11/13/2012    Abnormal liver CT 11/13/2012    Anemia 01/23/2013    Sinusitis 03/03/2014    NAFLD (nonalcoholic fatty liver disease) 03/30/2016    Colon cancer screening 11/16/2017    Chest pain 01/15/2018     Past Medical History:   Diagnosis Date    Anxiety     Arthritis     Depression     Diabetes mellitus type II     Diabetic peripheral neuropathy 9/10/2012    Facial spasm - right side 9/10/2012    History of irregular menstrual bleeding     Hypertension     Knee pain                 PAST SURGICAL HISTORY:    Past Surgical History:   Procedure Laterality Date    breast reduction      COLONOSCOPY N/A 11/16/2017    Procedure: COLONOSCOPY;  Surgeon: Alan Acosta MD;  Location: [facility];  Service: Endoscopy;  Laterality: N/A;  confirmed appt    TUBAL LIGATION           FAMILY HISTORY:                Family History   Problem Relation Age of Onset    Heart disease Mother     Hypertension Mother     Diabetes Father     Breast cancer Neg Hx     Colon cancer Neg Hx     Stroke Neg Hx     Ovarian cancer Neg Hx     Melanoma Neg Hx     Cervical cancer Neg Hx     Endometrial cancer Neg Hx     Vaginal cancer Neg Hx        SOCIAL HISTORY:          Tobacco:   History   Smoking Status    Former Smoker    Quit date: 1/9/1990   Smokeless Tobacco    Never Used     Comment: patient quit in 1990       alcohol use:    History   Alcohol Use    0.0 oz/week     Comment: Occasional                 Occupation: nurse in psychiatry department    ALLERGIES:    Review of patient's allergies indicates:   Allergen Reactions    Pristiq  [desvenlafaxine]      Other reaction(s): Nausea       CURRENT MEDICATIONS:    Current Outpatient Prescriptions   Medication Sig Dispense Refill    amLODIPine (NORVASC) 5 MG tablet Take 1 tablet (5 mg total)  "by mouth once daily. 90 tablet 3    aspirin (ADULT ASPIRIN EC LOW STRENGTH) 81 MG EC tablet Take 81 mg by mouth. 1 Tablet, Delayed Release (E.C.) Oral Every day      atorvastatin (LIPITOR) 40 MG tablet Take 1 tablet (40 mg total) by mouth every evening. 90 tablet 3    blood sugar diagnostic (BLOOD GLUCOSE TEST) Strp One touch (Patient taking differently: 3 (three) times daily. One touch) 100 strip 11    blood-glucose meter kit Use as instructed 1 each 11    insulin glargine, TOUJEO, (TOUJEO SOLOSTAR U-300 INSULIN) 300 unit/mL (1.5 mL) InPn pen Inject 54 Units into the skin once daily. 12 mL 3    insulin lispro (HUMALOG KWIKPEN) 100 unit/mL InPn pen Inject 10 units w/ each meal plus scale 150-200 +2, 201-250 +4, 251-300 +6, 301-350 +8, >350 +10. 3 Box 2    insulin syringe-needle U-100 1/2 mL 30 x 5/16" Syrg       lancets (MICROLET LANCET) Misc Inject 1 each into the skin 3 (three) times daily. 100 each 11    liraglutide 0.6 mg/0.1 mL, 18 mg/3 mL, subq PNIJ (VICTOZA 3-ASIYA) 0.6 mg/0.1 mL (18 mg/3 mL) PnIj Inject 0.6 mg into the skin once daily. 3 mL 6    metFORMIN (GLUCOPHAGE) 1000 MG tablet Take 1 tablet (1,000 mg total) by mouth daily with breakfast.      metoprolol succinate (TOPROL-XL) 100 MG 24 hr tablet Take 1 tablet (100 mg total) by mouth once daily. 90 tablet 3    multivitamin (MULTIVITAMIN) per tablet Take 1 tablet by mouth once daily.        pantoprazole (PROTONIX) 40 MG tablet Take 1 tablet (40 mg total) by mouth once daily. 30 tablet 1    pen needle, diabetic (PEN NEEDLE) 31 gauge x 5/16" Ndle USE ONE  SUBCUTANEOUSLY 4 TIMES DAILY 150 each 6    valsartan-hydrochlorothiazide (DIOVAN-HCT) 320-25 mg per tablet TAKE ONE TABLET BY MOUTH ONCE DAILY 90 tablet 1    vitamin E 400 UNIT capsule Take 400 Units by mouth once daily.        zolpidem (AMBIEN) 5 MG Tab Take 1 tablet (5 mg total) by mouth every evening. 30 tablet 2     No current facility-administered medications for this visit.             " "      REVIEW OF SYSTEMS:     Sleep related symptoms as per HPI.  CONST:minimalweight gain  5 pounds   HEENT: Denies sinus congestion  PULM: Denies dyspnea  CARD:   palpitations , discomfort chest pain- improve on protonix   GI:   acid reflux  : Denies polyuria, wake up   NEURO: Denies headaches  PSYCH:  Stress with work and schedule  HEME: Denies anemia   Otherwise, a balance of systems reviewed is negative.          PHYSICAL EXAM:  Vitals:    03/08/18 0933   BP: (!) 158/84   Pulse: 92   Resp: 16   Temp: 97.5 °F (36.4 °C)   TempSrc: Oral   SpO2: 98%   Weight: 82.2 kg (181 lb 3.5 oz)   Height: 5' 6" (1.676 m)   PainSc: 0-No pain     Body mass index is 29.25 kg/m².     GENERAL: Normal development, well groomed, pt was upset and teary at office.   HEENT:  Conjunctivae are non-erythematous; Pupils equal, round, and reactive to light; Nose is symmetrical; Nasal mucosa is pink and moist; Septum is midline; Inferior turbinates are normal; Nasal airflow is normal; Posterior pharynx is pink; Modified Mallampati: 2; Posterior palate is normal; Tonsils +2; Uvula is normal and pink;Tongue is normal; Dentition is fair; No TMJ tenderness; Jaw opening and protrusion without click and without discomfort.  NECK: Supple. Neck circumference is 14 inches.   SKIN: On face and neck: No abrasions, no rashes, no lesions.  No subcutaneous nodules are palpable.  RESPIRATORY: Chest is clear to auscultation.  Normal chest expansion and non-labored breathing at rest.  CARDIOVASCULAR: Normal S1, S2.  No murmurs, gallops or rubs. No carotid bruits bilaterally.  EXTREMITIES: No edema. No clubbing. No cyanosis. Station normal. Gait normal.        NEURO/PSYCH: Oriented to time, place and person. Normal attention span and concentration. Affect is full. Mood is normal.                                              ASSESSMENT    ICD-10-CM ICD-9-CM    1. MONIQUE (obstructive sleep apnea) G47.33 327.23 Home Sleep Studies      Ambulatory Referral to " Bariatric Medicine      Ambulatory Referral to Nutrition Services   2. Overweight (BMI 25.0-29.9) E66.3 278.02 Ambulatory Referral to Bariatric Medicine      Ambulatory Referral to Nutrition Services   3. Essential hypertension I10 401.9 Ambulatory Referral to Bariatric Medicine      Ambulatory Referral to Nutrition Services   4. Uncontrolled type 2 diabetes mellitus with microalbuminuria, with long-term current use of insulin E11.29 250.42 Ambulatory Referral to Bariatric Medicine    E11.65 791.0 Ambulatory Referral to Nutrition Services    R80.9 V58.67     Z79.4     5. Shift work sleep disorder G47.26 327.36    6. Anxiety F41.9 300.00        PLAN:    Sleep Apnea NEC. The patient has a history of mild MONIQUE with poor sleep quality and  hypertension. This may be multifactorial as she is a night shiftworker and  uncontrolled high blood pressure may be partly attributed to anxiety but  I recommend she obtains an updated sleep study.  Patient will be contacted after sleep study is done.      Shift work sleep disorder- discuss sleep stimulus control measures, management of anxiety.     Recommend weight loss due hypertension, diabetes type 2 and MONIQUE. Will refer her to bariatrics and nutrition to assist with this.       Patient will Follow-up follow up after sleep study (or call if schedule is not flexible).

## 2018-03-08 NOTE — PATIENT INSTRUCTIONS
Sleep Lab contact number [phone]            Obstructive Sleep Apnea  Obstructive sleep apnea is a condition that causes your air passages to become narrowed or blocked during sleep. As a result, breathing stops for short periods. Your body wakes up enough for breathing to begin again, though you don't remember it. The cycle of stopped breathing and brief awakenings can repeat dozens of times a night. This prevents the body from getting to the deeper stages of sleep that are needed for good rest and may cause your body's oxygen level to fall.  Signs of sleep apnea include loud snoring, noisy breathing, and gasping sounds during sleep. Daytime symptoms include waking up tired after a full night's sleep, waking up with headaches, feeling very sleepy or falling asleep during the day, and having problems with memory or concentration.  Risk factors for sleep apnea include:  · Being overweight  · Being a man, or a woman in menopause  · Smoking  · Using alcohol or sedating medicines  · Having enlarged structures in the nose or throat  Home care  Lifestyle changes that can help treat snoring and sleep apnea include the following:  · If you are overweight, lose weight. Talk to your healthcare provider about a weight-loss plan for you.  · Avoid alcohol for 3 to 4 hours before bedtime. Avoid sedating medications. Ask your healthcare provider about the medicines you take.  · If you smoke, talk to your healthcare provider about ways to quit.  · Sleep on your side. This can help prevent gravity from pulling relaxed throat tissues into your breathing passages.  · If you have allergies or sinus problems that block your nose, ask your healthcare provider for help.  Follow-up care  Follow up with your healthcare provider, or as advised. A diagnosis of sleep apnea is made with a sleep study. Your healthcare provider can tell you more about this test.  When to seek medical advice  Sleep apnea can make you more likely to have  certain health problems. These include high blood pressure, heart attack, stroke, and sexual dysfunction. If you have sleep apnea, talk to your healthcare provider about the best treatments for you.  Date Last Reviewed: 4/1/2017  © 2120-9863 [employer]. [address] [place] [address], [place] [address]. All rights reserved. This information is not intended as a substitute for professional medical care. Always follow your healthcare professional's instructions.

## 2018-03-13 ENCOUNTER — OFFICE VISIT (OUTPATIENT)
Dept: FAMILY MEDICINE | Facility: CLINIC | Age: 58
End: 2018-03-13
Payer: COMMERCIAL

## 2018-03-13 VITALS
WEIGHT: 179.88 LBS | OXYGEN SATURATION: 96 % | DIASTOLIC BLOOD PRESSURE: 84 MMHG | HEART RATE: 80 BPM | SYSTOLIC BLOOD PRESSURE: 136 MMHG | BODY MASS INDEX: 28.91 KG/M2 | RESPIRATION RATE: 16 BRPM | TEMPERATURE: 99 F | HEIGHT: 66 IN

## 2018-03-13 DIAGNOSIS — E11.42 TYPE 2 DIABETES MELLITUS WITH DIABETIC POLYNEUROPATHY, WITH LONG-TERM CURRENT USE OF INSULIN: Primary | ICD-10-CM

## 2018-03-13 DIAGNOSIS — Z79.4 TYPE 2 DIABETES MELLITUS WITH DIABETIC POLYNEUROPATHY, WITH LONG-TERM CURRENT USE OF INSULIN: Primary | ICD-10-CM

## 2018-03-13 DIAGNOSIS — F41.8 DEPRESSION WITH ANXIETY: ICD-10-CM

## 2018-03-13 DIAGNOSIS — I10 ESSENTIAL HYPERTENSION: ICD-10-CM

## 2018-03-13 PROCEDURE — 3075F SYST BP GE 130 - 139MM HG: CPT | Mod: CPTII,S$GLB,, | Performed by: FAMILY MEDICINE

## 2018-03-13 PROCEDURE — 99214 OFFICE O/P EST MOD 30 MIN: CPT | Mod: S$GLB,,, | Performed by: FAMILY MEDICINE

## 2018-03-13 PROCEDURE — 99999 PR PBB SHADOW E&M-EST. PATIENT-LVL III: CPT | Mod: PBBFAC,,, | Performed by: FAMILY MEDICINE

## 2018-03-13 PROCEDURE — 3079F DIAST BP 80-89 MM HG: CPT | Mod: CPTII,S$GLB,, | Performed by: FAMILY MEDICINE

## 2018-03-13 RX ORDER — ESCITALOPRAM OXALATE 10 MG/1
10 TABLET ORAL DAILY
Qty: 90 TABLET | Refills: 3 | Status: SHIPPED | OUTPATIENT
Start: 2018-03-13 | End: 2018-12-27 | Stop reason: SDUPTHER

## 2018-03-13 RX ORDER — CLONAZEPAM 0.5 MG/1
0.5 TABLET ORAL 2 TIMES DAILY PRN
Qty: 60 TABLET | Refills: 2 | Status: SHIPPED | OUTPATIENT
Start: 2018-03-13 | End: 2018-08-03 | Stop reason: SDUPTHER

## 2018-03-13 NOTE — PROGRESS NOTES
Chief Complaint   Patient presents with    Anxiety    Depression       Cindy Billingsley is a 57 y.o. female who presents per the Chief Complaint.  Pt is known to me and was last seen by me on 1/30/2018.  All known chronic medical issues have been documented.               Diabetes   She presents for her follow-up diabetic visit. She has type 2 diabetes mellitus. Her disease course has been worsening. Hypoglycemia symptoms include nervousness/anxiousness. Pertinent negatives for hypoglycemia include no confusion, dizziness, headaches, mood changes, seizures, sleepiness, speech difficulty, sweats or tremors. Associated symptoms include blurred vision, foot paresthesias and visual change. Pertinent negatives for diabetes include no chest pain, no fatigue, no foot ulcerations, no polydipsia, no polyphagia, no polyuria and no weakness. There are no hypoglycemic complications. Diabetic complications include peripheral neuropathy and retinopathy. Pertinent negatives for diabetic complications include no autonomic neuropathy, CVA, heart disease, impotence, nephropathy or PVD. Risk factors for coronary artery disease include diabetes mellitus, hypertension and stress. Current diabetic treatment includes insulin injections and oral agent (monotherapy). She is compliant with treatment most of the time. Her weight is stable. She is following a generally healthy diet. She has not had a previous visit with a dietitian. She participates in exercise intermittently. An ACE inhibitor/angiotensin II receptor blocker is being taken. She does not see a podiatrist.Eye exam is current.   Hypertension   This is a chronic problem. The current episode started more than 1 year ago. The problem has been waxing and waning since onset. The problem is uncontrolled. Associated symptoms include anxiety and blurred vision. Pertinent negatives include no chest pain, headaches, neck pain, palpitations, peripheral edema, shortness of breath or sweats.  There are no associated agents to hypertension. Risk factors for coronary artery disease include diabetes mellitus, dyslipidemia and post-menopausal state. Past treatments include angiotensin blockers and diuretics. The current treatment provides moderate improvement. Compliance problems include psychosocial issues.  Hypertensive end-organ damage includes retinopathy. There is no history of angina, kidney disease, CAD/MI, CVA, heart failure, left ventricular hypertrophy, PVD or renovascular disease. There is no history of chronic renal disease, coarctation of the aorta, hyperaldosteronism, hypercortisolism, hyperparathyroidism, a hypertension causing med, pheochromocytoma, sleep apnea or a thyroid problem.        ROS  Review of Systems   Constitutional: Negative.  Negative for activity change, appetite change, chills, diaphoresis, fatigue, fever and unexpected weight change.   HENT: Negative.  Negative for congestion, ear pain, hearing loss, nosebleeds, postnasal drip, rhinorrhea, sinus pressure, sneezing, sore throat and trouble swallowing.    Eyes: Positive for blurred vision. Negative for pain and visual disturbance.   Respiratory: Negative for cough, choking and shortness of breath.    Cardiovascular: Negative for chest pain, palpitations and leg swelling.   Gastrointestinal: Negative for abdominal pain, constipation, diarrhea, nausea and vomiting.   Endocrine: Negative for polydipsia, polyphagia and polyuria.   Genitourinary: Negative for difficulty urinating, dysuria, frequency, impotence and urgency.   Musculoskeletal: Negative.  Negative for arthralgias, back pain, gait problem, joint swelling, myalgias and neck pain.   Skin: Negative.    Allergic/Immunologic: Negative for environmental allergies and food allergies.   Neurological: Negative.  Negative for dizziness, tremors, seizures, syncope, speech difficulty, weakness, light-headedness and headaches.   Psychiatric/Behavioral: Positive for decreased  "concentration, dysphoric mood and sleep disturbance. Negative for agitation, behavioral problems, confusion, hallucinations, self-injury and suicidal ideas. The patient is nervous/anxious. The patient is not hyperactive.        Physical Exam  Vitals:    03/13/18 0758   BP: 136/84   Pulse: 80   Resp: 16   Temp: 98.8 °F (37.1 °C)    Body mass index is 29.04 kg/m².  Weight: 81.6 kg (179 lb 14.3 oz)   Height: 5' 6" (167.6 cm)     Physical Exam   Constitutional: She is oriented to person, place, and time. She appears well-developed and well-nourished. She is active and cooperative.  Non-toxic appearance. She does not have a sickly appearance. She does not appear ill. No distress.   HENT:   Head: Normocephalic and atraumatic.   Right Ear: Hearing and external ear normal. No decreased hearing is noted.   Left Ear: Hearing and external ear normal. No decreased hearing is noted.   Nose: Nose normal. No rhinorrhea or nasal deformity.   Mouth/Throat: Uvula is midline and oropharynx is clear and moist. She does not have dentures. Normal dentition.   Eyes: Conjunctivae, EOM and lids are normal. Pupils are equal, round, and reactive to light. Right eye exhibits no chemosis, no discharge and no exudate. No foreign body present in the right eye. Left eye exhibits no chemosis, no discharge and no exudate. No foreign body present in the left eye. No scleral icterus.   Neck: Normal range of motion and full passive range of motion without pain. Neck supple.   Cardiovascular: Normal rate, regular rhythm, S1 normal, S2 normal and normal heart sounds.  Exam reveals no gallop and no friction rub.    No murmur heard.  Pulmonary/Chest: Effort normal and breath sounds normal. No accessory muscle usage. No respiratory distress. She has no decreased breath sounds. She has no wheezes. She has no rhonchi. She has no rales.   Abdominal: Soft. Normal appearance. She exhibits no distension. There is no hepatosplenomegaly. There is no tenderness. " There is no rigidity, no rebound and no guarding.   Musculoskeletal: Normal range of motion.   Neurological: She is alert and oriented to person, place, and time. She has normal strength. No cranial nerve deficit or sensory deficit. She exhibits normal muscle tone. She displays no seizure activity. Coordination and gait normal.   Skin: Skin is warm, dry and intact. No rash noted. She is not diaphoretic.   Psychiatric: Her speech is normal and behavior is normal. Judgment and thought content normal. Her mood appears anxious. Her affect is labile. Cognition and memory are normal. She exhibits a depressed mood. She is attentive.       Assessment & Plan    1. Type 2 diabetes mellitus with diabetic polyneuropathy, with long-term current use of insulin  Managed by Endocrinology.  Patient is encouraged to follow a diet low in carbohydrates and simple sugars.  Advised to focus on good food choices and increased physical activity and encouraged to adhere to medication regimen and check glucose as recommended daily and contact office if glucose levels are not improving over time.  Screening blood test is not due at this time.  Foot exam was not done at this visit.     2. Essential hypertension  Patient was counseled and encouraged to maintain a low sodium diet, as well as increasing physical activity.  Recommend random BP checks at home on a regular basis.  Repeat BP at end of visit was not necessary. Will continue medication at this time, and follow up in 3-6 months, or sooner if blood pressure begins to increase.       3. Depression with anxiety  Discussed treatment options for long term and short term management.  Will begin an SSRI and continue BZD as needed for acute episodes of anxiety.  - escitalopram oxalate (LEXAPRO) 10 MG tablet; Take 1 tablet (10 mg total) by mouth once daily.  Dispense: 90 tablet; Refill: 3  - clonazePAM (KLONOPIN) 0.5 MG tablet; Take 1 tablet (0.5 mg total) by mouth 2 (two) times daily as needed  for Anxiety.  Dispense: 60 tablet; Refill: 2      Follow up documented    ACTIVE MEDICAL ISSUES:  Documented in Problem List    PAST MEDICAL HISTORY  Documented    PAST SURGICAL HISTORY:  Documented    SOCIAL HISTORY:  Documented    FAMILY HISTORY:  Documented    ALLERGIES AND MEDICATIONS: updated and reviewed.  Documented    Health Maintenance       Date Due Completion Date    Hemoglobin A1c 04/30/2018 1/30/2018    Override on 6/26/2015: Done (will do future ,  appoint 06/29/15)    Foot Exam 06/12/2018 6/12/2017    Override on 6/12/2017: Done    Override on 6/26/2015: Done (today)    Eye Exam 06/19/2018 6/19/2017 (Done)    Override on 6/19/2017: Done (Dr Enrique)    Override on 12/16/2016: Done (Dr. Royal)    Override on 4/1/2016: Done (Dr Brown)    Override on 6/26/2015: Declined ( she will go until 08/2015)    Override on 6/24/2014: Declined (had one done)    Lipid Panel 01/15/2019 1/15/2018    Override on 1/12/2015: Done (future)    Urine Microalbumin 02/26/2019 2/26/2018    Override on 1/12/2015: Done (future)    Pap Smear with HPV Cotest 02/28/2019 2/29/2016    Low Dose Statin 03/08/2019 3/8/2018    Mammogram 04/17/2019 4/17/2017    TETANUS VACCINE 08/01/2020 8/1/2010    Pneumococcal PPSV23 (Medium Risk) (2) 12/15/2024 8/2/2006    Colonoscopy 11/16/2027 11/16/2017

## 2018-03-14 ENCOUNTER — PATIENT OUTREACH (OUTPATIENT)
Dept: OTHER | Facility: OTHER | Age: 58
End: 2018-03-14

## 2018-03-14 ENCOUNTER — TELEPHONE (OUTPATIENT)
Dept: SLEEP MEDICINE | Facility: OTHER | Age: 58
End: 2018-03-14

## 2018-03-14 NOTE — LETTER
"      Estela Quinteros  [address] [place] [address]                                      Dear Cindy Billingsley,            Welcome to the [facility]!        My name is Estela ROSEHerbie Quinteros and I will be working alongside your dedicated clinician, , as your new health  for the duration of the program.  My job is to help you identify lifestyle changes to improve your blood pressure control.  We will talk about nutrition, exercise, and other ways that you may be able to adjust your current habits to better your health. Erendira will continue to help you manage your medication and ensure that the medications you are taking provide you with the intended benefits.  Together, we will keep working to improve your overall health and encourage you to meet your goals for a healthier lifestyle.   What we expect from YOU:    You will need to take blood pressure readings multiple times a week and no less than one reading per week.   It is important that you take your measurements at different times during the day, when possible.     What you should expect from US:   We will provide you with education about high blood pressure, including lifestyle changes that could help you to control your blood pressure.   We will review your weekly readings and provide you with monthly blood pressure progress reports after you have been in the program for more than 30 days.   We will send monthly progress reports on your blood pressure control to your physician so they can follow along with your progress as well.    You will be able to reach me by phone at [phone] or by your [facility] account by clicking "Send a message to your doctor's office" on the home screen then selecting my name in the "To the office of:" field.     I look forward to working with you to achieve your blood pressure goals!    Sincerely,    Estela Quinteros  Your personal Health     Please visit " [email or web address] to learn more about high blood pressure and what you can do lower your blood pressure.                                                                                                  Cindy Billingsley  [address] [place] [address]

## 2018-03-14 NOTE — PROGRESS NOTES
Last 5 Patient Entered Readings                                      Current 30 Day Average: 136/81     Recent Readings 3/14/2018 3/14/2018 3/14/2018 3/13/2018 3/12/2018    SBP (mmHg) 150 160 148 161 159    DBP (mmHg) 84 86 88 88 88    Pulse 77 80 95 88 81        Pt reports taking her BP med and insuline first thing in the morning.  Hypertension Digital Medicine Program (HDMP): Health  Introduction    Introduced Mrs. Cindy Billingsley to HDMP. Discussed health  role and recommended lifestyle modifications.    Diet (i.e. Low sodium, food labels): Pt reports trying to switch up her diet. Reports eating leftover Chinese food or eggs for breakfast. 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 Pt reports buying a Nutribullet and different food to start eating better.Reports for lunch she is eating a salad. Send pt resources on meal planning and low sodium/low carb smoothie recipes.    Exercise: Pt reports walking at least 5-6 times a week for 30 minute after working night shift.Reports using the Fit Program indu to track her steps.   Alcohol/Tobacco: Pt reports not drinking or smoking  Medication Adherence: has been compliant with the medicaiton regimen  Other goals: Pt reports wanting to lose weight and lessen her medicine in the future if possible.      Reviewed AHA recommendations:  Limit sodium intake to <2000mg/day  Perform 150 minutes of physical activity per week    Patient is aware of the importance of taking daily BP readings at various times of the day  Patient aware that the health  can be used as a resource for lifestyle modifications to help reduce or maintain a healthy BP  Patient is aware of the importance of medication adherence.  Patient is aware that HDMP team is not available for emergencies. Patient should call 911 or [facility] on [facility] if one arises.

## 2018-03-15 ENCOUNTER — TELEPHONE (OUTPATIENT)
Dept: SLEEP MEDICINE | Facility: OTHER | Age: 58
End: 2018-03-15

## 2018-03-16 ENCOUNTER — HOSPITAL ENCOUNTER (OUTPATIENT)
Dept: SLEEP MEDICINE | Facility: OTHER | Age: 58
Discharge: HOME | End: 2018-03-16
Attending: NURSE PRACTITIONER
Payer: COMMERCIAL

## 2018-03-16 DIAGNOSIS — G47.33 OSA (OBSTRUCTIVE SLEEP APNEA): ICD-10-CM

## 2018-03-16 PROCEDURE — 95800 SLP STDY UNATTENDED: CPT | Mod: 26,,, | Performed by: INTERNAL MEDICINE

## 2018-03-16 PROCEDURE — 95800 SLP STDY UNATTENDED: CPT

## 2018-03-19 ENCOUNTER — HOSPITAL ENCOUNTER (OUTPATIENT)
Dept: RADIOLOGY | Facility: HOSPITAL | Age: 58
Discharge: HOME | End: 2018-03-19
Attending: INTERNAL MEDICINE
Payer: COMMERCIAL

## 2018-03-19 ENCOUNTER — HOSPITAL ENCOUNTER (OUTPATIENT)
Dept: CARDIOLOGY | Facility: HOSPITAL | Age: 58
Discharge: HOME | End: 2018-03-19
Attending: INTERNAL MEDICINE
Payer: COMMERCIAL

## 2018-03-19 DIAGNOSIS — R07.9 CHEST PAIN, UNSPECIFIED TYPE: ICD-10-CM

## 2018-03-19 DIAGNOSIS — R07.9 ACUTE CHEST PAIN: ICD-10-CM

## 2018-03-19 LAB — DIASTOLIC DYSFUNCTION: NO

## 2018-03-19 PROCEDURE — 63600175 PHARM REV CODE 636 W HCPCS

## 2018-03-19 PROCEDURE — A9502 TC99M TETROFOSMIN: HCPCS

## 2018-03-19 RX ORDER — REGADENOSON 0.08 MG/ML
INJECTION, SOLUTION INTRAVENOUS
Status: DISCONTINUED
Start: 2018-03-19 | End: 2018-03-19

## 2018-03-26 ENCOUNTER — OFFICE VISIT (OUTPATIENT)
Dept: CARDIOLOGY | Facility: CLINIC | Age: 58
End: 2018-03-26
Payer: COMMERCIAL

## 2018-03-26 ENCOUNTER — PATIENT MESSAGE (OUTPATIENT)
Dept: SLEEP MEDICINE | Facility: CLINIC | Age: 58
End: 2018-03-26

## 2018-03-26 VITALS
WEIGHT: 178.38 LBS | SYSTOLIC BLOOD PRESSURE: 135 MMHG | RESPIRATION RATE: 15 BRPM | OXYGEN SATURATION: 95 % | BODY MASS INDEX: 28.67 KG/M2 | HEIGHT: 66 IN | DIASTOLIC BLOOD PRESSURE: 70 MMHG | HEART RATE: 89 BPM

## 2018-03-26 DIAGNOSIS — Z79.4 TYPE 2 DIABETES MELLITUS WITH DIABETIC POLYNEUROPATHY, WITH LONG-TERM CURRENT USE OF INSULIN: ICD-10-CM

## 2018-03-26 DIAGNOSIS — E11.42 TYPE 2 DIABETES MELLITUS WITH DIABETIC POLYNEUROPATHY, WITH LONG-TERM CURRENT USE OF INSULIN: ICD-10-CM

## 2018-03-26 DIAGNOSIS — E78.2 MIXED HYPERLIPIDEMIA: ICD-10-CM

## 2018-03-26 DIAGNOSIS — I10 ESSENTIAL HYPERTENSION: Primary | ICD-10-CM

## 2018-03-26 PROCEDURE — 3075F SYST BP GE 130 - 139MM HG: CPT | Mod: CPTII,S$GLB,, | Performed by: INTERNAL MEDICINE

## 2018-03-26 PROCEDURE — 99999 PR PBB SHADOW E&M-EST. PATIENT-LVL III: CPT | Mod: PBBFAC,,, | Performed by: INTERNAL MEDICINE

## 2018-03-26 PROCEDURE — 3045F PR MOST RECENT HEMOGLOBIN A1C LEVEL 7.0-9.0%: CPT | Mod: CPTII,S$GLB,, | Performed by: INTERNAL MEDICINE

## 2018-03-26 PROCEDURE — 3078F DIAST BP <80 MM HG: CPT | Mod: CPTII,S$GLB,, | Performed by: INTERNAL MEDICINE

## 2018-03-26 PROCEDURE — 99214 OFFICE O/P EST MOD 30 MIN: CPT | Mod: S$GLB,,, | Performed by: INTERNAL MEDICINE

## 2018-03-26 NOTE — PROGRESS NOTES
CARDIOVASCULAR PROGRESS NOTE    REASON FOR CONSULT:   Cindy Billingsley is a 57 y.o. female who presents for f/u of HTN, CP.    PCP: [place]  HISTORY OF PRESENT ILLNESS:   The patient returns for follow-up.  She reports no further chest pain, nor any dyspnea.  There's been no palpitations, lightheadedness, dizziness, or syncope.  She denies PND, orthopnea, or lower extremity edema.  There's been no melena, hematuria, or claudicant symptoms.  In the interim since her last office visit, the patient had a home sleep apnea test, the results of which are pending.  She's also been seen by her primary care physician and started on antianxiety medications.    Reviewed the results of her nuclear stress test which were normal.  Her lipid panel also reveals an excellent lipid profile on atorvastatin 40 mg daily.  I've encouraged her to continue her current medical therapy.  We also reviewed her pressure results from the home blood pressure monitor noting predominantly controlled blood pressures.  I've encouraged her to get back in touch with the ambulatory blood pressure program to estimate how to upload these results, as her last 2 weeks of blood pressures have not been uploaded.    CARDIOVASCULAR HISTORY:   none    PAST MEDICAL HISTORY:     Past Medical History:   Diagnosis Date    Anxiety     Arthritis     Depression     Diabetes mellitus type II     Diabetic peripheral neuropathy 9/10/2012    Facial spasm - right side 9/10/2012    History of irregular menstrual bleeding     Hypertension     Knee pain        PAST SURGICAL HISTORY:     Past Surgical History:   Procedure Laterality Date    breast reduction      COLONOSCOPY N/A 11/16/2017    Procedure: COLONOSCOPY;  Surgeon: Alan Acosta MD;  Location: [facility];  Service: Endoscopy;  Laterality: N/A;  confirmed appt    TUBAL LIGATION         ALLERGIES AND MEDICATION:     Review of patient's allergies indicates:   Allergen Reactions    Amlodipine      Other  "reaction(s): Edema    Pristiq  [desvenlafaxine]      Other reaction(s): Nausea     Previous Medications    AMLODIPINE (NORVASC) 5 MG TABLET    Take 1 tablet (5 mg total) by mouth once daily.    ASPIRIN (ADULT ASPIRIN EC LOW STRENGTH) 81 MG EC TABLET    Take 81 mg by mouth. 1 Tablet, Delayed Release (E.C.) Oral Every day    ATORVASTATIN (LIPITOR) 40 MG TABLET    Take 1 tablet (40 mg total) by mouth every evening.    BLOOD SUGAR DIAGNOSTIC (BLOOD GLUCOSE TEST) STRP    One touch    BLOOD-GLUCOSE METER KIT    Use as instructed    CLONAZEPAM (KLONOPIN) 0.5 MG TABLET    Take 1 tablet (0.5 mg total) by mouth 2 (two) times daily as needed for Anxiety.    ESCITALOPRAM OXALATE (LEXAPRO) 10 MG TABLET    Take 1 tablet (10 mg total) by mouth once daily.    INSULIN GLARGINE, TOUJEO, (TOUJEO SOLOSTAR U-300 INSULIN) 300 UNIT/ML (1.5 ML) INPN PEN    Inject 54 Units into the skin once daily.    INSULIN LISPRO (HUMALOG KWIKPEN) 100 UNIT/ML INPN PEN    Inject 10 units w/ each meal plus scale 150-200 +2, 201-250 +4, 251-300 +6, 301-350 +8, >350 +10.    INSULIN SYRINGE-NEEDLE U-100 1/2 ML 30 X 5/16" SYRG        LANCETS (MICROLET LANCET) MISC    Inject 1 each into the skin 3 (three) times daily.    LIRAGLUTIDE 0.6 MG/0.1 ML, 18 MG/3 ML, SUBQ PNIJ (VICTOZA 3-ASIYA) 0.6 MG/0.1 ML (18 MG/3 ML) PNIJ    Inject 0.6 mg into the skin once daily.    METFORMIN (GLUCOPHAGE) 1000 MG TABLET    Take 1 tablet (1,000 mg total) by mouth daily with breakfast.    METOPROLOL SUCCINATE (TOPROL-XL) 100 MG 24 HR TABLET    Take 1 tablet (100 mg total) by mouth once daily.    MULTIVITAMIN (MULTIVITAMIN) PER TABLET    Take 1 tablet by mouth once daily.      PANTOPRAZOLE (PROTONIX) 40 MG TABLET    Take 1 tablet (40 mg total) by mouth once daily.    PEN NEEDLE, DIABETIC (PEN NEEDLE) 31 GAUGE X 5/16" NDLE    USE ONE  SUBCUTANEOUSLY 4 TIMES DAILY    VALSARTAN-HYDROCHLOROTHIAZIDE (DIOVAN-HCT) 320-25 MG PER TABLET    TAKE ONE TABLET BY MOUTH ONCE DAILY    VITAMIN E 400 " UNIT CAPSULE    Take 400 Units by mouth once daily.      ZOLPIDEM (AMBIEN) 5 MG TAB    TAKE ONE TABLET BY MOUTH IN THE EVENING       SOCIAL HISTORY:     Social History     Social History    Marital status: Single     Spouse name: N/A    Number of children: N/A    Years of education: N/A     Occupational History    [facility]     Social History Main Topics    Smoking status: Former Smoker     Quit date: 1/9/1990    Smokeless tobacco: Never Used      Comment: patient quit in 1990    Alcohol use 0.0 oz/week      Comment: Occasional    Drug use: No    Sexual activity: Not Currently     Birth control/ protection: None, Rhythm     Other Topics Concern    Are You Pregnant Or Think You May Be? 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   HENT: Negative for nosebleeds.    Eyes: Negative for blurred vision, double vision and photophobia.   Respiratory: Negative for hemoptysis, shortness of breath and wheezing.    Cardiovascular: Negative for chest pain, palpitations, orthopnea, claudication, leg swelling and PND.   Gastrointestinal: Negative for abdominal pain, blood in stool, heartburn, melena, nausea and vomiting.   Genitourinary: Negative for flank pain and hematuria.   Musculoskeletal: Negative for falls, myalgias and neck pain.   Skin: Negative for rash.   Neurological: Negative for dizziness, seizures, loss of consciousness, weakness  "and headaches.   Endo/Heme/Allergies: Negative for polydipsia. Does not bruise/bleed easily.   Psychiatric/Behavioral: Negative for depression and memory loss. The patient is nervous/anxious.        PHYSICAL EXAM:     Vitals:    03/26/18 0944   BP: 135/70   Pulse: 89   Resp: 15    Body mass index is 28.79 kg/m².  Weight: 80.9 kg (178 lb 5.6 oz)   Height: 5' 6" (167.6 cm)     Physical Exam   Constitutional: She is oriented to person, place, and time. She appears well-developed and well-nourished. She is cooperative.  Non-toxic appearance. No distress.   HENT:   Head: Normocephalic and atraumatic.   Eyes: Conjunctivae and EOM are normal. Pupils are equal, round, and reactive to light. No scleral icterus.   Neck: Trachea normal. Neck supple. Normal carotid pulses and no JVD present. Carotid bruit is not present. No neck rigidity. No edema present. No thyromegaly present.   Cardiovascular: Normal rate, regular rhythm, S1 normal and S2 normal.  PMI is not displaced.  Exam reveals no gallop and no friction rub.    No murmur heard.  Pulses:       Carotid pulses are 2+ on the right side, and 2+ on the left side.  Pulmonary/Chest: Effort normal and breath sounds normal. No respiratory distress. She has no wheezes. She has no rales. She exhibits no tenderness.   Abdominal: Soft. Bowel sounds are normal. She exhibits no distension and no mass. There is no hepatosplenomegaly. There is no tenderness.   Musculoskeletal: She exhibits no edema or tenderness.   Feet:   Right Foot:   Skin Integrity: Negative for ulcer.   Left Foot:   Skin Integrity: Negative for ulcer.   Neurological: She is alert and oriented to person, place, and time. No cranial nerve deficit.   Skin: Skin is warm and dry. No rash noted. No erythema.   Psychiatric: She has a normal mood and affect. Her speech is normal and behavior is normal.   Vitals reviewed.      DATA:   EKG: (personally reviewed tracing)  1/15/18 SR 92    Laboratory:  CBC:    Recent Labs  Lab " 02/03/17  0740 12/12/17  0950 01/15/18  1130   WHITE BLOOD CELL COUNT 8.46 7.99 7.60   HEMOGLOBIN 12.9 12.7 13.1   HEMATOCRIT 38.9 38.2 39.1   PLATELETS 281 246 248       CHEMISTRIES:    Recent Labs  Lab 02/03/17  0740 12/12/17  0950 01/15/18  1130   GLUCOSE 225 H 136 H 210 H   SODIUM 138 143 142   POTASSIUM 4.4 4.1 3.6   BUN BLD 18 18 16   CREATININE 1.0 0.9 0.9   EGFR IF AFRICAN AMERICAN >60.0 >60.0 >60   EGFR IF NON- >60.0 >60.0 >60   CALCIUM 9.2 9.8 9.9       CARDIAC BIOMARKERS:    Recent Labs  Lab 01/15/18  1130 01/15/18  1555 01/15/18  1740   CPK  --  139 128   CPK MB  --  1.4 1.3   TROPONIN I <0.006 <0.006 0.008       COAGS:    Recent Labs  Lab 06/07/16  0800 12/12/17  0950 01/15/18  1130   INR 1.0 1.1 1.0       LIPIDS/LFTS:    Recent Labs  Lab 02/03/17  0740 12/12/17  0950 01/15/18  1130 01/15/18  1835 03/19/18  0723   CHOLESTEROL 174  --   --  191 127   TRIGLYCERIDES 215 H  --   --  141 96   HDL 35 L  --   --  47 39 L   LDL CHOLESTEROL 96.0  --   --  115.8 68.8   NON-HDL CHOLESTEROL 139  --   --  144 88   AST 19 20 16  --  21   ALT 17 19 19  --  27       The ASCVD Risk score (Criders DC Jr., et al., 2013) failed to calculate for the following reasons:    The valid total cholesterol range is 130 to 320 mg/dL      Cardiovascular Testing:  L MPI 3/19/18  Nuclear Quantitative Functional Analysis:   LVEF: >= 70 %  Impression: NORMAL MYOCARDIAL PERFUSION  1. The perfusion scan is free of evidence for myocardial ischemia or injury.   2. Resting wall motion is physiologic.   3. Resting LV function is normal.   4. The ventricular volumes are normal at rest and stress.   5. The extracardiac distribution of radioactivity is normal.   6. There was no previous study available to compare.    Echo 1/15/18    1 - Normal left ventricular systolic function (EF 55-60%).     2 - Concentric remodeling.     3 - Impaired LV relaxation, elevated LAP (grade 2 diastolic dysfunction).     ASSESSMENT:   # recent ER CP  eval, resolved.  MPI 3/2018 normal.  # HTN, controlled  # DM  # HLP, on atorva 40mg  # anxiety, recently started on rx by PCP  # ?MONIQUE, eval ongoing    PLAN:   Cont med rx  Diet/exercise/weight loss, Na+ avoidance  RTC 6 months    Milton Boston MD, FACC

## 2018-03-27 ENCOUNTER — PATIENT MESSAGE (OUTPATIENT)
Dept: ENDOCRINOLOGY | Facility: CLINIC | Age: 58
End: 2018-03-27

## 2018-03-28 ENCOUNTER — PATIENT MESSAGE (OUTPATIENT)
Dept: ENDOCRINOLOGY | Facility: CLINIC | Age: 58
End: 2018-03-28

## 2018-03-28 ENCOUNTER — TELEPHONE (OUTPATIENT)
Dept: ENDOCRINOLOGY | Facility: HOSPITAL | Age: 58
End: 2018-03-28

## 2018-03-28 ENCOUNTER — PATIENT OUTREACH (OUTPATIENT)
Dept: OTHER | Facility: OTHER | Age: 58
End: 2018-03-28

## 2018-03-28 NOTE — PROGRESS NOTES
Last 5 Patient Entered Readings                                      Current 30 Day Average: 135/80     Recent Readings 3/27/2018 3/26/2018 3/25/2018 3/24/2018 3/23/2018    SBP (mmHg) 133 135 147 142 145    DBP (mmHg) 84 77 86 81 84    Pulse 70 80 87 76 80          Patient's BP average is above goal of <130/80.     Patient denies s/s of hypotension (lightheadedness, dizziness, nausea, fatigue) associated with low readings. Instructed patient to inform me if this occurs, patient confirms understanding.      Patient denies s/s of hypertension (SOB, CP, severe headaches, changes in vision) associated with high readings. Instructed patient to go to the ED if BP > 180/110 and accompanied by hypertensive s/s, patient confirms understanding.    Will continue to monitor regularly. Will follow up in 2-3 weeks, sooner if BP begins to trend upward or downward.    Patient has my contact information and knows to call with any concerns or clinical changes.     Current HTN regimen:  Hypertension Medications             amLODIPine (NORVASC) 5 MG tablet Take 1 tablet (5 mg total) by mouth once daily.    metoprolol succinate (TOPROL-XL) 100 MG 24 hr tablet Take 1 tablet (100 mg total) by mouth once daily.    valsartan-hydrochlorothiazide (DIOVAN-HCT) 320-25 mg per tablet TAKE ONE TABLET BY MOUTH ONCE DAILY        Walking at least 3 days/week for 30 minutes. Trying to make diet changes, has a nutritionist appointment 4/2. Her goal is to focus on lifestyle modifications and decrease BP medications.    4/11 - push back follow up to allow health  outreach this week. Per sleep clinic notes, patient in process of obtaining oral appliance. (health  left voicemail for patient)

## 2018-03-31 DIAGNOSIS — Z79.4 TYPE 2 DIABETES MELLITUS WITH DIABETIC POLYNEUROPATHY, WITH LONG-TERM CURRENT USE OF INSULIN: ICD-10-CM

## 2018-03-31 DIAGNOSIS — E11.42 TYPE 2 DIABETES MELLITUS WITH DIABETIC POLYNEUROPATHY, WITH LONG-TERM CURRENT USE OF INSULIN: ICD-10-CM

## 2018-04-02 ENCOUNTER — TELEPHONE (OUTPATIENT)
Dept: SLEEP MEDICINE | Facility: CLINIC | Age: 58
End: 2018-04-02

## 2018-04-02 RX ORDER — INSULIN GLARGINE 300 U/ML
INJECTION, SOLUTION SUBCUTANEOUS
Qty: 12 ML | Refills: 0 | Status: SHIPPED | OUTPATIENT
Start: 2018-04-02 | End: 2018-05-29 | Stop reason: SDUPTHER

## 2018-04-02 NOTE — LETTER
April 2, 2018    Cindy Billingsley  [address]  [place] [address]             [facility]  [address]  [place] [address]  Phone: [phone]  Fax: [phone] Dear Cindy Billingsley          Please find enclosed copies of your sleep study results, letter for oral appliance recommendation and prescription for oral appliance to take to your dentist. If you have any questions or concerns, please don't hesitate to call.    Sincerely,        Emy Hannon, NP

## 2018-04-02 NOTE — TELEPHONE ENCOUNTER
----- Message from Magda Kevin sent at 4/2/2018  9:01 AM CDT -----  Contact: sELF  Pt requesting sleep study results. [phone].

## 2018-04-02 NOTE — LETTER
2018    Cindy Billingsley  [address]  [place] [address]      [facility]  [address]  [place] [address]  Phone: [phone]  Fax: [phone] 2018    RE:  Oral Appliance for the treatment of Obstructive Sleep Apnea      To whom it may concern:    I am writing this letter on behalf of my patient  Cindy Billingsley  1960.   The patient has been diagnosed with obstructive sleep apnea (MONIQUE) associated with symptoms of unrestful sleep.  MONIQUE was confirmed on sleep study  in  (RDI 7.5) at [facility] but opted for no treatment at the time. Her symptoms have persisted and a repeat sleep study on 3/16/2018 reconfirmed MONIQUE (AHI 9, RDI 14) She presents today to get reevaluated because of poor sleep  quality. She reports unrestful sleep. The patients degree of sleep apnea falls within the mild  severity category.  The patient has elected to pursue use of an oral appliance as treatment for obstructive sleep apnea, which is appropriate, given the circumstances.    According to the American Academy of Sleep Medicine, oral appliances are indicated for use in patients with MONIQUE. This is defined in their Practice Parameters for the Treatment of Snoring and Obstructive Sleep Apnea with Oral Appliances: An Update for 2005    Although not as efficacious as CPAP, oral appliances (OAs) are indicated for use in patients with mild to moderate MONIQUE who prefer OAs to CPAP, or who do not respond to CPAP, are not appropriate candidates for CPAP, or who fail treatment attempts with CPAP or treatment with behavioral measures such as weight loss or sleep position change. (Guideline)    Cindy Billingsley  is a suitable candidate for an oral appliance, and I am recommending this as medically necessary for the condition of obstructive sleep apnea. I am requesting this service to be completed.  Please fit MAD/OA to treat MONIQUE.    Sincerely,        Emy KINGSTON, JULIUSP   Sleep Medicine -  [facility]  [address] [place] [address]  [phone]

## 2018-04-02 NOTE — TELEPHONE ENCOUNTER
Spoke to patient, informed of test results, pt interested in weight loss and oral appliance. Pt mailed results with letter and prescription for oral appliance to take to her dentist.

## 2018-04-12 ENCOUNTER — PATIENT OUTREACH (OUTPATIENT)
Dept: OTHER | Facility: OTHER | Age: 58
End: 2018-04-12

## 2018-04-12 NOTE — PROGRESS NOTES
Last 5 Patient Entered Readings                                      Current 30 Day Average: 138/81     Recent Readings 4/11/2018 4/10/2018 4/8/2018 4/7/2018 4/6/2018    SBP (mmHg) 140 126 132 130 156    DBP (mmHg) 79 78 84 81 89    Pulse 82 76 80 73 83        4/12-LVM. Follow up attempt. Will call again on 4/24.  4/24-LVM. Follow up attempt #2. Sent [facility] message. Will call again on 5/8.    Encounter consisted of patient expressing her frustration in our cuff being 20+ higher than her cuff. Encouraged patient to switch BP cuff out at [facility].     She states she has not been waiting at least 45 minutes to take BP readings after taking BP medication or exercising. Encouraged patient to take BP readings a different time of day when she is relaxed and informed her of proper BP reading/timing.     Hypertension Digital Medicine Program (HDMP): Health  Follow Up    Lifestyle Modifications:    1.Low sodium diet: no Patient reports consuming salad. She states she has been trying to adhere to a diabetic meal plan previous HC sent. Patient states having issues with treating herself in the mornings. Patient states having success with green protein smoothies. Patient states she enjoys the smoothies. Encouraged patient to return to smoothies.     2.Physical activity: yes Patient reports trying to walk at least 5 days, for 40 minutes/day. She states being active with her son in the park this weekend. Patient states walking her dog also. Encouraged patient to continue her exercise regiment.       3.Hypotension/Hypertension symptoms: no   Frequency/Alleviating factors/Precipitating factors, etc.     4.Patient has been compliant with the medication regimen.     Follow up with Mrs. White Jose Cruz completed. No further questions or concerns. I will follow up in a few weeks to assess progress.

## 2018-04-13 DIAGNOSIS — R10.13 EPIGASTRIC PAIN: ICD-10-CM

## 2018-04-13 RX ORDER — PANTOPRAZOLE SODIUM 40 MG/1
40 TABLET, DELAYED RELEASE ORAL DAILY
Qty: 30 TABLET | Refills: 1 | Status: CANCELLED | OUTPATIENT
Start: 2018-04-13 | End: 2018-05-13

## 2018-04-25 ENCOUNTER — PATIENT MESSAGE (OUTPATIENT)
Dept: FAMILY MEDICINE | Facility: CLINIC | Age: 58
End: 2018-04-25

## 2018-04-25 DIAGNOSIS — Z12.31 ENCOUNTER FOR SCREENING MAMMOGRAM FOR BREAST CANCER: Primary | ICD-10-CM

## 2018-04-26 ENCOUNTER — OFFICE VISIT (OUTPATIENT)
Dept: BARIATRICS | Facility: CLINIC | Age: 58
End: 2018-04-26
Payer: COMMERCIAL

## 2018-04-26 VITALS
BODY MASS INDEX: 28.95 KG/M2 | WEIGHT: 180.13 LBS | HEART RATE: 77 BPM | HEIGHT: 66 IN | DIASTOLIC BLOOD PRESSURE: 56 MMHG | SYSTOLIC BLOOD PRESSURE: 110 MMHG

## 2018-04-26 DIAGNOSIS — I10 ESSENTIAL HYPERTENSION: ICD-10-CM

## 2018-04-26 DIAGNOSIS — Z79.4 TYPE 2 DIABETES MELLITUS WITH DIABETIC POLYNEUROPATHY, WITH LONG-TERM CURRENT USE OF INSULIN: ICD-10-CM

## 2018-04-26 DIAGNOSIS — R10.13 EPIGASTRIC PAIN: ICD-10-CM

## 2018-04-26 DIAGNOSIS — K21.9 GASTROESOPHAGEAL REFLUX DISEASE WITHOUT ESOPHAGITIS: ICD-10-CM

## 2018-04-26 DIAGNOSIS — Z79.4 LONG-TERM CURRENT USE OF INSULIN FOR DIABETES MELLITUS: ICD-10-CM

## 2018-04-26 DIAGNOSIS — E11.9 DIABETES MELLITUS WITH FEATURES OF INSULIN RESISTANCE: ICD-10-CM

## 2018-04-26 DIAGNOSIS — E66.3 OVERWEIGHT (BMI 25.0-29.9): ICD-10-CM

## 2018-04-26 DIAGNOSIS — E11.42 TYPE 2 DIABETES MELLITUS WITH DIABETIC POLYNEUROPATHY, WITH LONG-TERM CURRENT USE OF INSULIN: ICD-10-CM

## 2018-04-26 DIAGNOSIS — E11.9 LONG-TERM CURRENT USE OF INSULIN FOR DIABETES MELLITUS: ICD-10-CM

## 2018-04-26 DIAGNOSIS — K21.9 GASTROESOPHAGEAL REFLUX DISEASE WITHOUT ESOPHAGITIS: Primary | ICD-10-CM

## 2018-04-26 DIAGNOSIS — M17.0 PRIMARY OSTEOARTHRITIS OF BOTH KNEES: ICD-10-CM

## 2018-04-26 DIAGNOSIS — G47.33 OSA (OBSTRUCTIVE SLEEP APNEA): ICD-10-CM

## 2018-04-26 PROCEDURE — 99999 PR PBB SHADOW E&M-EST. PATIENT-LVL III: CPT | Mod: PBBFAC,,, | Performed by: INTERNAL MEDICINE

## 2018-04-26 PROCEDURE — 99244 OFF/OP CNSLTJ NEW/EST MOD 40: CPT | Mod: S$GLB,,, | Performed by: INTERNAL MEDICINE

## 2018-04-26 RX ORDER — DIETHYLPROPION HYDROCHLORIDE 75 MG/1
75 TABLET, EXTENDED RELEASE ORAL DAILY
Qty: 30 TABLET | Refills: 2 | Status: SHIPPED | OUTPATIENT
Start: 2018-04-26 | End: 2018-09-24

## 2018-04-26 RX ORDER — PANTOPRAZOLE SODIUM 40 MG/1
40 TABLET, DELAYED RELEASE ORAL DAILY
Qty: 90 TABLET | Refills: 3 | Status: SHIPPED | OUTPATIENT
Start: 2018-04-26 | End: 2018-09-24

## 2018-04-26 NOTE — LETTER
April 27, 2018        Emy Hannon, ROCIO  [address]  [place] [address]             [facility]  [address]  [place] [address]  Phone: [phone]  Fax: [phone]   Patient: Cindy Billingsley   MR Number: [id number]   YOB: 1960   Date of Visit: 4/26/2018       Dear Dr. Hannon:    Thank you for referring Cindy Billingsley to me for evaluation. Below are the relevant portions of my assessment and plan of care.        1. Overweight (BMI 25.0-29.9)    2. Diabetes mellitus with features of insulin resistance    3. Long-term current use of insulin for diabetes mellitus    4. Type 2 diabetes mellitus with diabetic polyneuropathy, with long-term current use of insulin    5. Gastroesophageal reflux disease without esophagitis    6. Primary osteoarthritis of both knees    7. MONIQUE (obstructive sleep apnea)    8. Essential hypertension              If you have questions, please do not hesitate to call me. I look forward to following Cindy along with you.    Sincerely,      Tamiko Myrick MD           CC  No Recipients

## 2018-04-26 NOTE — PATIENT INSTRUCTIONS
Patient warned of common side effects of diethylpropion including anxiety, insomnia, palpitations and increased blood pressure. It was also explained that it is for short-term usage along with diet and exercise, and that stopping the medication without making lifestyle changes will result in regain of weight. Patient states understanding.    Weight loss medications are controlled substances.  They require routine follow up. Prescription or pills that are lost or destroyed will not be replaced.         Check blood sugar regularly as medications may need to be adjusted with changes in diet and weight loss. Send in readings regularly.     Add some type of resistance training 2-3 days a week. These can be body weight exercises, light weight or elastic bands. [employer] and [employer] are great sources for free work out plans and videos.       3 meals a day made up of the following:  Unlimited green vegetables, tomatoes, mushrooms, spaghetti squash, cauliflower, meat, poultry, seafood, eggs and hard cheeses.   Milk and plain yogurt  Dressings, seasonings, condiments, etc should have less than 2 g sugars.   Beans (1-1.5 cups) or nuts (1/4 cup) can have 1 x a day.   1-2 servings of citrus fruits, berries, pineapple or melon a day (1/2 cup)  Avoid fried foods    No grains, rice, pasta, potatoes, bread, corn, peas, oatmeal, grits, tortillas, crackers, chips    No soda, sweet tea, juices or lemonade    Www.dietdoctor.Poundworld for recipes. Moderate carb intake      Meal Ideas for Regular Bariatric Diet  *Recipes and products available at www.bariatriceating.com    *You can substitute regular dairy and/or dressings, and whole eggs for egg whites in the ideas below.       Breakfast: (15-20g protein)    - Egg white omelet: 2 egg whites or ½ cup Egg Beaters. (Optional proteins: cheese, shrimp, black beans, chicken, sliced turkey) (Optional veggies: tomatoes, salsa, spinach, mushrooms, onions, green peppers, or small slice avocado)     -  Egg and sausage: 1 egg or ¼ cup Egg Beaters (any variety), with 1 maty or 2 links of Turkey sausage or Veggie breakfast sausage (Lanx or YogaTrail)    - Crust-less breakfast quiche: To make a glass pie dish, mix 4oz part skim Ricotta, 1 cup skim milk, and 2 eggs as your base. Add protein: shredded cheese, sliced lean ham or turkey, turkey mitchell/sausage. Add veggies: tomato, onion, green onion, mushroom, green pepper, spinach, etc.    - Yogurt parfait: Mix 1 - 6oz container Dannon Light N Fit vanilla yogurt, with ¼ cup Kashi Go Lean cereal    - Cottage cheese and fruit: ½ cup part-skim cottage cheese or ricotta cheese topped with fresh fruit or sugar free preserves     - Britni Romano's Vanilla Egg custard* (add 2 Tbsp instant coffee granules to make Cappuccino Custard*)    - Hi-Protein café latte (skim milk, decaf coffee, 1 scoop protein powder). Optional to add Sugar free syrup or extract flavoring.    Lunch: (20-30g protein)    - ½ cup Black bean soup (Homemade or Progresso), with ¼ cup shredded low-fat cheese. Top with chopped tomato or fresh salsa.     - Lean deli turkey breast and low-fat sliced cheese, mustard or light chambers to moisten, rolled up together, or wrapped in a Natan lettuce leaf    - Chicken salad made from dinner leftovers, moisten with low-fat salad dressing or light chambers. Also try leftover salmon, shrimp, tuna or boiled eggs. Serve ½ cup over dark green salad    - Fat-free canned refried beans, topped with ¼ cup shredded low-fat cheese. Top with chopped tomato or fresh salsa.     - Greek salad: Top mixed greens with 1-2oz grilled chicken, tomatoes, red onions, 2-3 kalamata olives, and sprinkle lightly with feta cheese. Spritz with Balsamic vinegar to taste.     - Crust-less lunch quiche: To make a glass pie dish, mix 4oz part skim Ricotta, 1 cup skim milk, and 2 eggs as your base. Add protein: shredded cheese, sliced lean ham or turkey, shrimp, chicken. Add veggies: tomato, onion, green  onion, mushroom, green pepper, spinach, artichoke, broccoli, etc.    - Pizza bake: tomato sauce, low-fat shredded mozzarella and turkey pepperoni or English mitchell. Add any veggies.    - Cucumber crab bites: Spread ¼ cup crab dip (lump crabmeat + light cream cheese and green onions) over sliced cucumber.     - Chicken with light spinach and artichoke dip*: Puree in : 6oz cooked and drained spinach, 2 cloves garlic, 1 can cannelloni beans, ½ cup chopped green onions, 1 can drained artichoke hearts (not marinated in oil), lemon juice and basil. Mix in 2oz chopped up chicken.    Supper: (20-30g protein)    - Serve grilled fish over dark green salad tossed with low-fat dressing, served with grilled asparagus adkins     - Rotisserie chicken salad: served with sliced strawberries, walnuts, fat-free feta cheese crumbles and 1 tbsp Moras Own Light Raspberry [place] Vinaigrette    - Shrimp cocktail: Dip cold boiled shrimp in homemade low-sugar cocktail sauce (1/2 cup Lisa One Carb ketchup, 2 tbsp horseradish, 1/4 tsp hot sauce, 1 tsp Worcestershire sauce, 1 tbsp freshly-squeezed lemon juice). Serve with dark green salad, walnuts, and crumbled blue cheese drizzled with olive oil and Balsamic vinegar    - Tuna Melt: Spread tuna salad onto 2 thick slices of tomato. Top with low-fat cheese and broil until cheese is melted. May also be made with chicken salad of shrimp salad. [place] with different types of cheeses.    - Homemade low-fat Chili using extra lean ground beef or ground turkey. Top with shredded cheese and salsa as desired. May add dollop fat-free sour cream if desired    - Dinner Omelet with shrimp or chicken and onion, green peppers and chives.    - No noodle lasagna: Use sliced zucchini or eggplant in place of noodles.  Layer with part skim ricotta cheese and low sugar meat sauce (use very lean ground beef or ground turkey).    - Mexican chicken bake: Bake chunks of chicken breast or thigh with  taco seasoning, Pace brand enchilada sauce, green onions and low-fat cheese. Serve with ¼ cup black beans or fat free refried beans topped with chopped tomatoes or salsa.    - Triston frozen meatballs, simmered in Classico Marinara sauce. Different flavors of salsa or spaghetti sauce create different dishes! Sprinkle with parmesan cheese. Serve with grilled or steamed veggies, or a dark green salad.    - Simmer boneless skinless chicken thigh chunks in Classico Marinara sauce or roasted salsa until tender with chopped onion, bell pepper, garlic, mushrooms, spinach, etc.     - Hamburger, without the bun, dressed the way you like. Served with grilled or steamed veggies.    - Eggplant parmesan: Bake slices of eggplant at 350 degrees for 15 minutes. Layer tomato sauce, sliced eggplant and low-fat mozzarella cheese in a baking dish and cover with foil. Bake 30-40 more minutes or until bubbly. Uncover and bake at 400 degrees for about 15 more minutes, or until top is slightly crisp.    - Fish tacos: grilled/baked white fish, wrapped in Natan lettuce leaf, topped with salsa, shredded low-fat cheese, and light coleslaw.    Snacks: (100-200 calories; >5g protein)    - 1 low-fat cheese stick with 8 cherry tomatoes or 1 serving fresh fruit  - 4 thin slices fat-free turkey breast and 1 slice low-fat cheese  - 4 thin slices fat-free honey ham with wedge of melon  - 1/4 cup unsalted nuts with ½ cup fruit  - 6-oz container Dannon Light n Fit vanilla yogurt, topped with 1oz unsalted nuts         - apple, celery or baby carrots spread with 2 Tbsp natural peanut butter or almond butter   - apple slices with 1 oz slice low-fat cheese  - celery, cucumber, bell pepper or baby carrots dipped in ¼ cup hummus bean spread or light spinach and artichoke dip (*recipe in lunch section)  - 100 calorie bag microwave light popcorn with 3 tbsp grated parmesan cheese  - Calvin Links Beef Steak - 14g protein! (similar to beef jerky)  - 2 wedges  [place] - Light Herb & Garlic Cheese with sliced cucumber or green bell pepper  - 1/2 cup low-fat cottage cheese with ¼ cup fruit or ¼ cup salsa  - RTD Protein drinks: Atkins, Low Carb Slim Fast, EAS light, Muscle Milk Light, etc.  - Homemade Protein drinks: C Soy95, Isopure, Nectar, UNJURY, Whey Gourmet, etc. Mix 1 scoop powder with 8oz skim/1% milk or light soymilk.  - Protein bars: Atkins, EAS, Pure Protein, Think Thin, Detour, etc. Must have 0-4 grams sugar - Read the label.    Takeout Options: No more than twice/week  Deli - Salads (no pasta or rice), meats, cheeses. Roasted chicken. Lox (salmon)    Mexican - Platters which don't include tortillas, chips, or rice. Go easy on the beans. Example: Fajitas without the tortillas. Ask the  not to bring chips to the table if they are too tempting.    Greek - Meat or fish and vegetable, but no bread or rice. Including hummus, baba ganoush, etc, is OK. Most sit-down Greek restaurants can provide you with cucumber slices for dipping instead of jf bread.    Fast Food (Avoid as much as possible) - Salads (no croutons and limit salad dressing to 2 tbsp), grilled chicken sandwich without the bun and ask for no chambers. Baileys low fat chili or Taco Bell pintos and cheese.    BBQ - The meats are fine if you ask for sauces on the side, but most of the traditional side dishes are loaded with carbs. Charles slaw, baked beans and BBQ sauce are typically made with sugar.    Chinese - Nothing deep-fried, no rice or noodles. Many Chinese sauces have starch and sugar in them, so you'll have to use your judgement. If you find that these sauces trigger cravings, or cause Dumping, you can ask for the sauce to be made without sugar or just use soy sauce.      Dinner in Under 30 minutes and 400 calories.     Baked Chicken breast with Broccoli Cheese Casserole  2-3 chicken breast (approximately 6-8 oz each)    2 tsp each garlic and herb Mrs. Cuellar seasoning   2 tsp your favorite  creole seasoning  2 tablespoons of balsamic vinegar  2 - 10 oz packages of frozen broccoli  1 egg   ½ cup skim milk  ½ tsp paprika   ½ tsp cayenne pepper   1 can fat free cream of mushroom soup.   1 .5 cups of shredded cheddar cheese    Pre-heat oven to 450 degrees. Toss 2-3 chicken breast (approximately 6-8 oz each) in 2 tsp each garlic and herb Mrs. Dash seasoning, 2 tsp your favorite creole seasoning, and 2 tablespoons of balsamic vinegar. This can also be done up to 24 hours ahead of time, and the chicken can be left in the refrigerator to marinate. Place on a baking sheet sprayed with non-stick cooking spray and bake on 450 degrees for 10 min, then reduce heat to 350 degrees and cook an addition 10-15 minutes until chicken is cooked through.   While chicken is baking, microwave safe dish, thaw 2 - 10 oz packages of frozen broccoli. Drain any excess water, season with salt, pepper, all-purpose seasoning of your choice. In another bowl, whisk together 1 egg, ½ cup skim milk, ½ tsp paprika, ½ tsp cayenne pepper and 1 can fat free cream of mushroom soup. Combine broccoli, soup mixture, 1 cup of shredded cheddar cheese in baking dish sprayed with cooking spray. Top with remaining cheese. Bake in the oven with chicken for about 20 min or until set cheese is melted and juarez.     Makes 4 servings. 389 calories per serving.10 g fat, 13 g carbs, 54g protein     Sheet Pan Dickerson Shrimp  1 pound large shrimp, shelled, peeled and deveined.   2 tablespoon olive oil  The zest and the juice of 1 lime  ½ tsp chili powder  ½-1 tsp cayenne pepper  1 tsp cumin  ½ tsp dry oregano  1 red bell pepper  1 green bell pepper  1 red onion  2 cups mushrooms, quartered  1 avocado  Whole richie lettuce leaves  Preheat oven to 375 degrees.  In a bowl combine shrimp, lime juice, lime zest, cumin, cayenne pepper, chili powder, and a pinch of salt. Set aside.   Slice peppers and onions into thin strips. Toss in 1 tablespoon of olive oil.  Sprinkle with salt and pepper and arrange in a single layer over 1/3 of a large sheet pan  Toss mushrooms with remaining olive oil, oregano, salt and pepper. Arrange in single layer on sheet pan. Place sheet pan in oven for about 15-20 minutes until vegetables are softened, cooked about ¾ of the way through. Remove the sheet pan from oven.  Change setting on oven to low broil.  If needed, rearrange vegetables to make room for shrimp. Arrange the shrimp in a single layer on the sheet pan and return pan to oven. After 5 minutes, flip shrimp. Cook 3-5 additional minutes, or until  Shrimp are opaque.   Serve shrimp and cooked vegetables on lettuce leaves with sliced avocado.   Makes 4 servings. Calories per servin; 23g fat, 19 g carbohydrates, 27g protein.    Zoodles Primavera with Seasoned Ricotta  8 cups zucchini noodles. These can be purchased already prepared, or you can make them on your own with whole zucchini and a vegetable spiralizer.   1.5 cups cherry tomatoes, quartered  2 cups sliced mushrooms  1 cup chopped fresh broccoli  1 cup frozen peas and carrots  2 cloves garlic, finely chopped  16 oz part skim ricotta cheese  2 oz Neufchatel cream cream cheese, cut into small cubes  Juice and zest of 1 lemon  1 pinch red pepper flakes    2 tsp Italian seasoning  4-5 leaves fresh basil, thinly sliced  1 tablespoon of olive oil  Parmesan cheese for topping (optional)     In a bowl, combine ricotta cheese, 1 tsp Italian seasoning, ½ teaspoon lemon zest. Season with salt and pepper to taste. Mix well. Fold in half of fresh basil. Set aside.   Heat a large skillet to medium high. Add olive oil. Sautee mushrooms until starting to become tender. Season them with salt and pepper while cooking.  Add broccoli and peas and carrots. Reduce heat to medium. Cover pan and cook for 4-5 minutes until broccoli is tender and peas and carrots are warmed through. Add Neufchatel cheese, Italian seasoning, red pepper flakes, 1 tsp  lemon zest and lemon juice. Stir until a smooth sauce is formed. Add zucchini noodles (zoodles) to skillet and toss in sauce, then add cherry tomatoes.  Separate zoodle and vegetables into 4 equal portions. Serve each with a ¼ cup serving of seasoned ricotta cheese. Sprinkle with grated parmesan cheese or fresh basil,  if desired.   Makes 4 servings. Calories per servin calories, 32 g carbs, 33 g protein, 18 g fat

## 2018-04-26 NOTE — PROGRESS NOTES
Subjective:       Patient ID: Cindy Billingsley is a 57 y.o. female.    Chief Complaint: Consult    CC:weight.     Current attempts at weight loss: New pt to me, referred by Charly Hannon NP/Dr. Lombard, with Patient Active Problem List:     Type 2 diabetes mellitus with diabetic polyneuropathy, with long-term current use of insulin.      Insomnia due to stress     Diabetic peripheral neuropathy     Facial spasm - right side     Essential hypertension     Mixed hyperlipidemia     GERD (gastroesophageal reflux disease)     Gout     Long-term current use of insulin for diabetes mellitus     Primary osteoarthritis of both knees     MONIQUE (obstructive sleep apnea)    Lab Results       Component                Value               Date                       HGBA1C                   7.3 (H)             01/30/2018                 HGBA1C                   7.2 (H)             10/18/2017                 HGBA1C                   7.5 (H)             06/12/2017            Lab Results       Component                Value               Date                       LDLCALC                  68.8                03/19/2018                 CREATININE               0.9                 01/15/2018                  Walking 5 days a week for 30 min.  Trying to eat more veg and less fried foods.      Previous diet attempts: Denies.     History of medication for loss: Denies.   checked today. On victoza. Does get full easily.   Heaviest weight: 186#    Lightest weight:112#    Goal weight: 140#      Last eye exam:   Due in August.    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 Pt has not been cooking.   Breakfast: Whiting- turkey and swiss. Smoothie with kale, spinach, fruit, nuts and almond milk. Weekends: Leftover pasta    Lunch: Spinach wrap or salad.     Dinner: Pasta, Whiting. If out (more lately)- burger.    Snacks: Berries, nuts, avocado, carrots and celery, greek yogurt.     Beverages: water. Red wine  "on weekend if off.     Willingness to change:     EKG:EKG Conclusions:    1. The EKG portion of this study is negative for ischemia at a peak heart rate of 101 bpm (65% of predicted).   2. Blood pressure remained stable throughout the protocol  (Presenting BP: 127/77 Peak BP: 140/66).   3. No significant arrhythmias were present.   4. There were no symptoms of chest discomfort or significant dyspnea throughout the protocol      Impression: NORMAL MYOCARDIAL PERFUSION  1. The perfusion scan is free of evidence for myocardial ischemia or injury.   2. Resting wall motion is physiologic.   3. Resting LV function is normal.   4. The ventricular volumes are normal at rest and stress.   5. The extracardiac distribution of radioactivity is normal.   6. There was no previous study available to compare.     BMR: 1417      Review of Systems   Constitutional: Negative for chills and fever.   Respiratory: Positive for apnea. Negative for shortness of breath.         Getting mouth guard made for MONIQUE.    Cardiovascular: Negative for chest pain and leg swelling.   Gastrointestinal: Negative for constipation and diarrhea.        + GERD   Genitourinary: Negative for difficulty urinating and dysuria.   Musculoskeletal: Positive for arthralgias. Negative for back pain.   Neurological: Negative for dizziness and light-headedness.   Psychiatric/Behavioral: Positive for dysphoric mood. The patient is nervous/anxious.         Meds have been helping.        Objective:     BP (!) 110/56   Pulse 77   Ht 5' 6" (1.676 m)   Wt 81.7 kg (180 lb 1.9 oz)   LMP 03/03/2016 (LMP Unknown)   BMI 29.07 kg/m²     Physical Exam   Constitutional: She is oriented to person, place, and time. She appears well-developed. No distress.   HENT:   Head: Normocephalic and atraumatic.   Eyes: EOM are normal. Pupils are equal, round, and reactive to light. No scleral icterus.   Neck: Normal range of motion. Neck supple. No thyromegaly present.   Cardiovascular: " Normal rate and normal heart sounds.  Exam reveals no gallop and no friction rub.    No murmur heard.  Pulmonary/Chest: Effort normal and breath sounds normal. No respiratory distress. She has no wheezes.   Abdominal: Soft. Bowel sounds are normal. She exhibits no distension. There is no tenderness.   Musculoskeletal: Normal range of motion. She exhibits no edema.   Neurological: She is alert and oriented to person, place, and time. No cranial nerve deficit.   Skin: Skin is warm and dry. No erythema.   Psychiatric: She has a normal mood and affect. Her behavior is normal. Judgment normal.   Vitals reviewed.      Assessment:       1. Overweight (BMI 25.0-29.9)    2. Diabetes mellitus with features of insulin resistance    3. Long-term current use of insulin for diabetes mellitus    4. Type 2 diabetes mellitus with diabetic polyneuropathy, with long-term current use of insulin    5. Gastroesophageal reflux disease without esophagitis    6. Primary osteoarthritis of both knees    7. MONIQUE (obstructive sleep apnea)    8. Essential hypertension        Plan:         1. Overweight (BMI 25.0-29.9)    - diethylpropion 75 mg TbSR; Take 75 mg by mouth once daily.  Dispense: 30 tablet; Refill: 2  Patient warned of common side effects of diethylpropion including anxiety, insomnia, palpitations and increased blood pressure. It was also explained that it is for short-term usage along with diet and exercise, and that stopping the medication without making lifestyle changes will result in regain of weight. Patient states understanding.    Weight loss medications are controlled substances.  They require routine follow up. Prescription or pills that are lost or destroyed will not be replaced.        Add some type of resistance training 2-3 days a week. These can be body weight exercises, light weight or elastic bands. [employer] and [employer] are great sources for free work out plans and videos.       3 meals a day made up of the  following:  Unlimited green vegetables, tomatoes, mushrooms, spaghetti squash, cauliflower, meat, poultry, seafood, eggs and hard cheeses.   Milk and plain yogurt  Dressings, seasonings, condiments, etc should have less than 2 g sugars.   Beans (1-1.5 cups) or nuts (1/4 cup) can have 1 x a day.   1-2 servings of citrus fruits, berries, pineapple or melon a day (1/2 cup)  Avoid fried foods    No grains, rice, pasta, potatoes, bread, corn, peas, oatmeal, grits, tortillas, crackers, chips    No soda, sweet tea, juices or lemonade    [email or web address] for recipes. Moderate carb intake      2. Diabetes mellitus with features of insulin resistance  Pt advised to check blood sugar regularly as medications may need to be adjusted with changes in diet and weight loss.      3. Long-term current use of insulin for diabetes mellitus  See #2.     4. Type 2 diabetes mellitus with diabetic polyneuropathy, with long-term current use of insulin  See #2    5. Gastroesophageal reflux disease without esophagitis  Expect improvement with weight loss  Attempt to wean PPI once 10% TBW lost.      6. Primary osteoarthritis of both knees  Increase low impact activity as tolerated.  Avoid high impact activity, very heavy lifting or other exercise regimens that may cause discomfort.      7. MONIQUE (obstructive sleep apnea)  Discussed importance of compliance with treatment, and that MONIQUE does require getting closer to normal BMI range to see improvement that some other co-morbidities. Encourage treatment for MONIQUE.     8. Essential hypertension  The current medical regimen is effective;  continue present plan and medications. Expect improvement with weight loss.

## 2018-05-02 ENCOUNTER — PATIENT OUTREACH (OUTPATIENT)
Dept: OTHER | Facility: OTHER | Age: 58
End: 2018-05-02

## 2018-05-02 NOTE — PROGRESS NOTES
Last 5 Patient Entered Readings                                      Current 30 Day Average: 132/81     Recent Readings 5/7/2018 5/5/2018 5/5/2018 5/5/2018 5/4/2018    SBP (mmHg) 130 124 128 125 130    DBP (mmHg) 74 77 72 85 79    Pulse 78 65 65 76 66          Patient's BP average is slightly above goal of <130/80.     Patient denies s/s of hypotension (lightheadedness, dizziness, nausea, fatigue) associated with low readings. Instructed patient to inform me if this occurs, patient confirms understanding.      Patient denies s/s of hypertension (SOB, CP, severe headaches, changes in vision) associated with high readings. Instructed patient to go to the ED if BP > 180/110 and accompanied by hypertensive s/s, patient confirms understanding.    Will continue to monitor regularly. Will follow up in 2-3 weeks, sooner if BP begins to trend upward or downward.    Patient has my contact information and knows to call with any concerns or clinical changes.     Current HTN regimen:  Hypertension Medications             amLODIPine (NORVASC) 5 MG tablet Take 1 tablet (5 mg total) by mouth once daily.    metoprolol succinate (TOPROL-XL) 100 MG 24 hr tablet Take 1 tablet (100 mg total) by mouth once daily.    valsartan-hydrochlorothiazide (DIOVAN-HCT) 320-25 mg per tablet TAKE ONE TABLET BY MOUTH ONCE DAILY        Patient works nights and finds BP readings are higher after working, likely due to less sleep or not waiting at least 1 hour after BP medications. Reviewed patient's daily schedule with her and discussed times for measuring BP. She is also working on incorporating more physical activity by going to the gym.

## 2018-05-20 ENCOUNTER — HOSPITAL ENCOUNTER (OUTPATIENT)
Dept: RADIOLOGY | Facility: HOSPITAL | Age: 58
Discharge: HOME | End: 2018-05-20
Attending: FAMILY MEDICINE
Payer: COMMERCIAL

## 2018-05-20 VITALS — BODY MASS INDEX: 28.95 KG/M2 | WEIGHT: 180.13 LBS | HEIGHT: 66 IN

## 2018-05-20 DIAGNOSIS — Z12.31 ENCOUNTER FOR SCREENING MAMMOGRAM FOR BREAST CANCER: ICD-10-CM

## 2018-05-20 PROCEDURE — 77063 BREAST TOMOSYNTHESIS BI: CPT | Mod: 26,,, | Performed by: RADIOLOGY

## 2018-05-20 PROCEDURE — 77067 SCR MAMMO BI INCL CAD: CPT | Mod: 26,,, | Performed by: RADIOLOGY

## 2018-05-20 PROCEDURE — 77067 SCR MAMMO BI INCL CAD: CPT | Mod: TC

## 2018-05-24 ENCOUNTER — PATIENT OUTREACH (OUTPATIENT)
Dept: OTHER | Facility: OTHER | Age: 58
End: 2018-05-24

## 2018-05-24 NOTE — PROGRESS NOTES
Last 5 Patient Entered Readings                                      Current 30 Day Average: 128/79     Recent Readings 5/23/2018 5/21/2018 5/20/2018 5/20/2018 5/18/2018    SBP (mmHg) 108 112 130 117 126    DBP (mmHg) 80 78 82 77 76    Pulse 76 78 78 78 84          Patient's BP average is controlled based on 2017 ACC/AHA HTN guidelines of goal BP <130/80.      Patient denies s/s of hypotension (lightheadedness, dizziness, nausea, fatigue) associated with low readings. Instructed patient to inform me if this occurs, patient confirms understanding.      Patient denies s/s of hypertension (SOB, CP, severe headaches, changes in vision) associated with high readings. Instructed patient to go to the ED if BP > 180/110 and accompanied by hypertensive s/s, patient confirms understanding.     Patient's health , Tian oLra, will be following up every 3-4 weeks. I will continue to monitor regularly and will follow up in ~ 2 months, sooner if BP begins to trend upward or downward.    Patient has my contact information and knows to call with any concerns or clinical changes.     Current HTN regimen:  Hypertension Medications             amLODIPine (NORVASC) 5 MG tablet Take 1 tablet (5 mg total) by mouth once daily.    metoprolol succinate (TOPROL-XL) 100 MG 24 hr tablet Take 1 tablet (100 mg total) by mouth once daily.    valsartan-hydrochlorothiazide (DIOVAN-HCT) 320-25 mg per tablet TAKE ONE TABLET BY MOUTH ONCE DAILY

## 2018-05-25 ENCOUNTER — PATIENT OUTREACH (OUTPATIENT)
Dept: OTHER | Facility: OTHER | Age: 58
End: 2018-05-25

## 2018-05-25 NOTE — PROGRESS NOTES
Last 5 Patient Entered Readings                                      Current 30 Day Average: 128/79     Recent Readings 5/23/2018 5/21/2018 5/20/2018 5/20/2018 5/18/2018    SBP (mmHg) 108 112 130 117 126    DBP (mmHg) 80 78 82 77 76    Pulse 76 78 78 78 84          Digital Medicine: Health  Follow Up    Left voicemail to follow up with Mrs. White Jose Cruz.  Current BP average 128/79 mmHg is at goal, [130/80]. BP is at goal.

## 2018-05-29 DIAGNOSIS — Z79.4 TYPE 2 DIABETES MELLITUS WITH DIABETIC POLYNEUROPATHY, WITH LONG-TERM CURRENT USE OF INSULIN: ICD-10-CM

## 2018-05-29 DIAGNOSIS — E11.42 TYPE 2 DIABETES MELLITUS WITH DIABETIC POLYNEUROPATHY, WITH LONG-TERM CURRENT USE OF INSULIN: ICD-10-CM

## 2018-05-29 RX ORDER — INSULIN GLARGINE 300 U/ML
INJECTION, SOLUTION SUBCUTANEOUS
Qty: 12 ML | Refills: 0 | Status: SHIPPED | OUTPATIENT
Start: 2018-05-29 | End: 2018-07-16 | Stop reason: SDUPTHER

## 2018-06-01 NOTE — PROGRESS NOTES
Last 5 Patient Entered Readings                                      Current 30 Day Average: 125/78     Recent Readings 6/1/2018 5/30/2018 5/29/2018 5/26/2018 5/23/2018    SBP (mmHg) 113 105 114 121 108    DBP (mmHg) 78 77 70 77 80    Pulse 78 69 75 79 76        Encounter also consisted of patient asking about diabetes program offered by [facility]. Informed patient of proper protocol to join program.     Digital Medicine: Health  Follow Up    Lifestyle Modifications:    1.Dietary Modifications (Sodium intake <2,000mg/day, food labels, dining out): Patient states she has been limiting her portions and carbs. Encouraged patient to continue limiting her portions and will further assess dietary changes upon next encounter.      2.Physical Activity: Patient joined an exercise program. She states using the treadmill 3x/week for 30 minutes. Patient reports enjoying her exercise program and plans on continuing her workout regimen. Encouraged patient to continue her exercise regimen.     3.Medication Therapy: Patient has been compliant with the medication regimen.    4.Patient has the following medication side effects/concerns:   (Frequency/Alleviating factors/Precipitating factors, etc.)     Follow up with Mrs. White Jose Cruz completed. No further questions or concerns. Will continue follow up to achieve health goals.

## 2018-06-04 ENCOUNTER — PATIENT MESSAGE (OUTPATIENT)
Dept: BARIATRICS | Facility: CLINIC | Age: 58
End: 2018-06-04

## 2018-06-06 ENCOUNTER — PATIENT MESSAGE (OUTPATIENT)
Dept: BARIATRICS | Facility: CLINIC | Age: 58
End: 2018-06-06

## 2018-06-07 ENCOUNTER — PATIENT MESSAGE (OUTPATIENT)
Dept: FAMILY MEDICINE | Facility: CLINIC | Age: 58
End: 2018-06-07

## 2018-06-07 DIAGNOSIS — Z79.4 TYPE 2 DIABETES MELLITUS WITH DIABETIC POLYNEUROPATHY, WITH LONG-TERM CURRENT USE OF INSULIN: Primary | ICD-10-CM

## 2018-06-07 DIAGNOSIS — E11.42 TYPE 2 DIABETES MELLITUS WITH DIABETIC POLYNEUROPATHY, WITH LONG-TERM CURRENT USE OF INSULIN: Primary | ICD-10-CM

## 2018-06-18 ENCOUNTER — LAB VISIT (OUTPATIENT)
Dept: LAB | Facility: HOSPITAL | Age: 58
End: 2018-06-18
Attending: NURSE PRACTITIONER
Payer: COMMERCIAL

## 2018-06-18 DIAGNOSIS — Z79.4 TYPE 2 DIABETES MELLITUS WITH DIABETIC POLYNEUROPATHY, WITH LONG-TERM CURRENT USE OF INSULIN: ICD-10-CM

## 2018-06-18 DIAGNOSIS — E11.42 TYPE 2 DIABETES MELLITUS WITH DIABETIC POLYNEUROPATHY, WITH LONG-TERM CURRENT USE OF INSULIN: ICD-10-CM

## 2018-06-18 LAB
ESTIMATED AVG GLUCOSE: 157 MG/DL
HBA1C MFR BLD HPLC: 7.1 %

## 2018-06-18 PROCEDURE — 83036 HEMOGLOBIN GLYCOSYLATED A1C: CPT

## 2018-06-18 PROCEDURE — 36415 COLL VENOUS BLD VENIPUNCTURE: CPT | Mod: PO

## 2018-06-21 DIAGNOSIS — Z79.4 TYPE 2 DIABETES MELLITUS WITH DIABETIC POLYNEUROPATHY, WITH LONG-TERM CURRENT USE OF INSULIN: ICD-10-CM

## 2018-06-21 DIAGNOSIS — E11.42 TYPE 2 DIABETES MELLITUS WITH DIABETIC POLYNEUROPATHY, WITH LONG-TERM CURRENT USE OF INSULIN: ICD-10-CM

## 2018-06-21 RX ORDER — METFORMIN HYDROCHLORIDE 1000 MG/1
TABLET ORAL
Qty: 180 TABLET | Refills: 0 | Status: SHIPPED | OUTPATIENT
Start: 2018-06-21 | End: 2019-01-29 | Stop reason: SDUPTHER

## 2018-06-26 ENCOUNTER — OFFICE VISIT (OUTPATIENT)
Dept: ENDOCRINOLOGY | Facility: CLINIC | Age: 58
End: 2018-06-26
Payer: COMMERCIAL

## 2018-06-26 VITALS
HEIGHT: 66 IN | BODY MASS INDEX: 28.06 KG/M2 | SYSTOLIC BLOOD PRESSURE: 128 MMHG | DIASTOLIC BLOOD PRESSURE: 68 MMHG | HEART RATE: 69 BPM | WEIGHT: 174.63 LBS

## 2018-06-26 DIAGNOSIS — E78.2 MIXED HYPERLIPIDEMIA: ICD-10-CM

## 2018-06-26 DIAGNOSIS — E11.42 TYPE 2 DIABETES MELLITUS WITH DIABETIC POLYNEUROPATHY, WITH LONG-TERM CURRENT USE OF INSULIN: Primary | ICD-10-CM

## 2018-06-26 DIAGNOSIS — G47.33 OSA (OBSTRUCTIVE SLEEP APNEA): ICD-10-CM

## 2018-06-26 DIAGNOSIS — E11.9 LONG-TERM CURRENT USE OF INSULIN FOR DIABETES MELLITUS: ICD-10-CM

## 2018-06-26 DIAGNOSIS — I10 ESSENTIAL HYPERTENSION: ICD-10-CM

## 2018-06-26 DIAGNOSIS — Z79.4 LONG-TERM CURRENT USE OF INSULIN FOR DIABETES MELLITUS: ICD-10-CM

## 2018-06-26 DIAGNOSIS — Z79.4 TYPE 2 DIABETES MELLITUS WITH DIABETIC POLYNEUROPATHY, WITH LONG-TERM CURRENT USE OF INSULIN: Primary | ICD-10-CM

## 2018-06-26 DIAGNOSIS — E11.42 DIABETIC PERIPHERAL NEUROPATHY: Chronic | ICD-10-CM

## 2018-06-26 PROCEDURE — 99999 PR PBB SHADOW E&M-EST. PATIENT-LVL IV: CPT | Mod: PBBFAC,,, | Performed by: NURSE PRACTITIONER

## 2018-06-26 PROCEDURE — 99214 OFFICE O/P EST MOD 30 MIN: CPT | Mod: S$GLB,,, | Performed by: NURSE PRACTITIONER

## 2018-06-26 RX ORDER — INSULIN LISPRO 100 [IU]/ML
INJECTION, SOLUTION INTRAVENOUS; SUBCUTANEOUS
Qty: 3 BOX | Refills: 2
Start: 2018-06-26 | End: 2020-01-06 | Stop reason: SDUPTHER

## 2018-06-26 NOTE — PROGRESS NOTES
CC: Ms. Cindy Billingsley arrives today for management of Type 2 and review of chronic medical conditions. She arrives alone today.     HPI: Ms. Cindy Billingsley was diagnosed with type II DM in 1996 . Seen by Mariaelena Page in endocrine clinic 12/10/12. She was diagnosed diabetes in 1996 during routine physical exam. Prior to that had gestational diabetes with her first child at the age of 28. She started treatment on Glucophage - eventually Glucotrol was added. Lantus insulin was started ~ 2006 and she started on Novolog in March of 2012.    In 3/15 when A1c 9.7% converted to Bydureon, next A1c trended down to 7.4%.     Last seen by me in 2/2018. She is requesting to enroll in DM Digital Health Program.   A1c has improved 7.1%.  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    CURRENT DIABETIC MEDS: Metformin 1000 mg AM (max tolerated), Toujeo 48 units AM,Victoza 1.8mg QD, Humalog w/ mod correction scale 150-200 +2 prn    Uses Vials and Pens:comfortable with insulin injections, rotating sites in abd.   Type of Glucose Meter: ReliOn Prime  BG readings are checked 3-4 x/day with ReliOn Prime  Did not bring meter or logs to clinic, reports BG readings are :  AM readings when off work:  140s  1-2pm readings when wake up: 114     Hypoglycemia: 1 episode recently; BG 70s while napping  Feels s/s when BG <100, including shaky, disoriented, sweaty, tx with regular coke, hard candy    Exercise: cardio (treadmill/ bike) 1 hour 3x a week - started in May 2016    Dietary Habits: 2 meals day, + snacking at night on chicken salad sandwich, crackers, apple juice; drinks water, 1-2 cups coffee at work; eating more at home lately    Polyuria: No  Polydipsia: No  Polyphagia: No    Last Eye Exam: outside eye MD - has appt August 2018, goes q6 months  Has not had a podiatry exam    REVIEW OF SYSTEMS  Personally reviewed past medical, social, and family history.     General: + fatigue related  "to work / stress. + down 7#weight; denies fever.   Eyes:  Denies blurry vision, wears glasses. denies eye pain or redness.  Cardiac: no palpitations; chest pain.  Respiratory: wears mouth guard; no cough or dyspnea  GI: Good appetite, no nausea, vomit  Skin: no rashes, itching, dryness, or color changes.   Neuro: c/o Intermittent parethesias to bilateral feet    Vital Signs  Personally reviewed the labs noted below.     /68   Pulse 69   Ht 5' 6" (1.676 m)   Wt 79.2 kg (174 lb 9.7 oz)   LMP 03/03/2016 (LMP Unknown)   BMI 28.18 kg/m²     Hemoglobin A1C   Date Value Ref Range Status   06/18/2018 7.1 (H) 4.0 - 5.6 % Final     Comment:     ADA Screening Guidelines:  5.7-6.4%  Consistent with prediabetes  >or=6.5%  Consistent with diabetes  High levels of fetal hemoglobin interfere with the HbA1C  assay. Heterozygous hemoglobin variants (HbS, HgC, etc)do  not significantly interfere with this assay.   However, presence of multiple variants may affect accuracy.     01/30/2018 7.3 (H) 4.0 - 5.6 % Final     Comment:     According to ADA guidelines, hemoglobin A1c <7.0% represents  optimal control in non-pregnant diabetic patients. Different  metrics may apply to specific patient populations.   Standards of Medical Care in Diabetes-2016.  For the purpose of screening for the presence of diabetes:  <5.7%     Consistent with the absence of diabetes  5.7-6.4%  Consistent with increasing risk for diabetes   (prediabetes)  >or=6.5%  Consistent with diabetes  Currently, no consensus exists for use of hemoglobin A1c  for diagnosis of diabetes for children.  This Hemoglobin A1c assay has significant interference with fetal   hemoglobin   (HbF). The results are invalid for patients with abnormal amounts of   HbF,   including those with known Hereditary Persistence   of Fetal Hemoglobin. Heterozygous hemoglobin variants (HbAS, HbAC,   HbAD, HbAE, HbA2) do not significantly interfere with this assay;   however, presence of " multiple variants in a sample may impact the %   interference.     10/18/2017 7.2 (H) 4.0 - 5.6 % Final     Comment:     According to ADA guidelines, hemoglobin A1c <7.0% represents  optimal control in non-pregnant diabetic patients. Different  metrics may apply to specific patient populations.   Standards of Medical Care in Diabetes-2016.  For the purpose of screening for the presence of diabetes:  <5.7%     Consistent with the absence of diabetes  5.7-6.4%  Consistent with increasing risk for diabetes   (prediabetes)  >or=6.5%  Consistent with diabetes  Currently, no consensus exists for use of hemoglobin A1c  for diagnosis of diabetes for children.  This Hemoglobin A1c assay has significant interference with fetal   hemoglobin   (HbF). The results are invalid for patients with abnormal amounts of   HbF,   including those with known Hereditary Persistence   of Fetal Hemoglobin. Heterozygous hemoglobin variants (HbAS, HbAC,   HbAD, HbAE, HbA2) do not significantly interfere with this assay;   however, presence of multiple variants in a sample may impact the %   interference.         Chemistry        Component Value Date/Time     01/15/2018 1130    K 3.6 01/15/2018 1130     01/15/2018 1130    CO2 30 (H) 01/15/2018 1130    BUN 16 01/15/2018 1130    CREATININE 0.9 01/15/2018 1130     (H) 01/15/2018 1130        Component Value Date/Time    CALCIUM 9.9 01/15/2018 1130    ALKPHOS 128 03/19/2018 0723    AST 21 03/19/2018 0723    ALT 27 03/19/2018 0723    BILITOT 0.9 03/19/2018 0723        Lab Results   Component Value Date    CHOL 127 03/19/2018    CHOL 191 01/15/2018    CHOL 174 02/03/2017     Lab Results   Component Value Date    HDL 39 (L) 03/19/2018    HDL 47 01/15/2018    HDL 35 (L) 02/03/2017     Lab Results   Component Value Date    LDLCALC 68.8 03/19/2018    LDLCALC 115.8 01/15/2018    LDLCALC 96.0 02/03/2017     Lab Results   Component Value Date    TRIG 96 03/19/2018    TRIG 141 01/15/2018     TRIG 215 (H) 02/03/2017     Lab Results   Component Value Date    TSH 0.951 01/15/2018     Lab Results   Component Value Date    MICALBCREAT 81.6 (H) 02/26/2018     PE:  GENERAL: Well developed, well nourished.  PSYCH: AAOx3, appropriate mood and affect, pleasant expression, conversant, appears relaxed, well groomed.   EYES: Conjunctiva, corneas clear, no lid lag, EOM intact.  NECK: Supple, trachea midline, FROM  CHEST: Resp even and unlabored  ABDOMEN: Soft, non-tender, non-distended  VASCULAR: Same temperature peripherally to centrally, no edema, brisk capillary refill BUE.  NEURO: Gait steady.  SKIN: Normal skin turgor. Skin warm and dry. No areas of breakdown, no acanthosis nigracans.  FEET: Footware appropriate.      Assessment/Plan  1. Type II diabetes mellitus with neurological manifestations, uncontrolled ; long term use of insulin  Hemoglobin A1c    - Discussed DM, progression of disease, long term complications. Reviewed A1c and BG goals. Continue metformin AM only (max dose tolerated related to GI distress).   Consider decreasing Toujeo 44 units daily if any further hypoglycemic episodes.  - Continue humalog mod dose correction scale 150-200 +2, etc only. Consider d/c Humalog in future.   Continue Victoza 1.8mg QD.   bg monitoring BID at alternating times of day.   Reviewed insulins' onset, peak, duration, dosing, administration, site rotation.  - Reviewed hypoglycemia, s/s and appropriate tx options. Instructed to monitor BG ac/hs, bring logs/ meter to clinic visits.   - takes ASA, ARB; statin     2. Diabetic peripheral neuropathy  Optimize DM control, no Rx at present.   3. Hypertension associated with diabetes  Continue meds as previously prescribed and monitor   4. Combined hyperlipidemia associated with type 2 diabetes mellitus  Uncontrolled, near goal; on statin therapy; LFTs WNL; reviewed low chol/ low fat diet  - consider lipitor to 80mg QD but will defer to cardiology   5. Diabetic retinopathy  associated with type 2 diabetes mellitus, without macular edema, with unspecified retinopathy  Need better BG control, she will reschedule eye exam appt with outside eye MD     Orders Placed This Encounter   Procedures    Hemoglobin A1c    HM DIABETES FOOT EXAM       FOLLOW UP in 4 months   labs prior [place] [facility] [place]

## 2018-06-29 ENCOUNTER — PATIENT OUTREACH (OUTPATIENT)
Dept: OTHER | Facility: OTHER | Age: 58
End: 2018-06-29

## 2018-06-29 NOTE — PROGRESS NOTES
Last 5 Patient Entered Readings                                      Current 30 Day Average: 119/79     Recent Readings 6/27/2018 6/26/2018 6/22/2018 6/15/2018 6/14/2018    SBP (mmHg) 129 128 128 115 121    DBP (mmHg) 83 68 89 75 83    Pulse 81 69 76 85 80        Digital Medicine: Health  Follow Up    Left voicemail to follow up with Herbie Cindy Jose Cruz.  Current BP average 119/79 mmHg is at goal, [130/80].

## 2018-07-05 DIAGNOSIS — F51.02 INSOMNIA DUE TO STRESS: ICD-10-CM

## 2018-07-05 RX ORDER — ZOLPIDEM TARTRATE 5 MG/1
TABLET ORAL
Qty: 30 TABLET | Refills: 2 | Status: CANCELLED | OUTPATIENT
Start: 2018-07-05

## 2018-07-09 ENCOUNTER — PATIENT MESSAGE (OUTPATIENT)
Dept: ENDOCRINOLOGY | Facility: CLINIC | Age: 58
End: 2018-07-09

## 2018-07-11 NOTE — PROGRESS NOTES
Last 5 Patient Entered Readings                                      Current 30 Day Average: 124/81     Recent Readings 7/6/2018 7/5/2018 7/2/2018 6/27/2018 6/26/2018    SBP (mmHg) 124 129 122 129 128    DBP (mmHg) 86 88 73 83 68    Pulse 85 81 85 81 69        Digital Medicine: Health  Follow Up    Lifestyle Modifications:    1.Dietary Modifications (Sodium intake <2,000mg/day, food labels, dining out): Patient reports she is trying to limit fried foods. Patient reports increasing her fruit and vegetable intake. She states she may not have time to make meals at home. She states always reading food labels and limiting the sodium consumption. She reports not completely eliminating processed foods but she states reducing it to 1x/week. Encouraged patient to continue her low-sodium diet/approach.      2.Physical Activity: Patient reports trying to exercise to 3x/week. She states feeling tired when she does not work out. Patient states planning on exercising after encounter. Encouraged patient to continue her exercise regimen.     3.Medication Therapy: Patient has been compliant with the medication regimen.    4.Patient has the following medication side effects/concerns:   (Frequency/Alleviating factors/Precipitating factors, etc.)     Follow up with Ms. Cindy Billingsley completed. No further questions or concerns. Will continue follow up to achieve health goals.

## 2018-07-16 DIAGNOSIS — F51.02 INSOMNIA DUE TO STRESS: ICD-10-CM

## 2018-07-16 DIAGNOSIS — E11.42 TYPE 2 DIABETES MELLITUS WITH DIABETIC POLYNEUROPATHY, WITH LONG-TERM CURRENT USE OF INSULIN: ICD-10-CM

## 2018-07-16 DIAGNOSIS — Z79.4 TYPE 2 DIABETES MELLITUS WITH DIABETIC POLYNEUROPATHY, WITH LONG-TERM CURRENT USE OF INSULIN: ICD-10-CM

## 2018-07-16 RX ORDER — INSULIN GLARGINE 300 U/ML
INJECTION, SOLUTION SUBCUTANEOUS
Qty: 12 ML | Refills: 0 | Status: SHIPPED | OUTPATIENT
Start: 2018-07-16 | End: 2018-08-02

## 2018-07-16 RX ORDER — ZOLPIDEM TARTRATE 5 MG/1
5 TABLET ORAL NIGHTLY
Qty: 30 TABLET | Refills: 2 | Status: SHIPPED | OUTPATIENT
Start: 2018-07-16 | End: 2018-11-16 | Stop reason: SDUPTHER

## 2018-07-17 DIAGNOSIS — I10 ESSENTIAL HYPERTENSION: ICD-10-CM

## 2018-07-17 RX ORDER — VALSARTAN AND HYDROCHLOROTHIAZIDE 320; 25 MG/1; MG/1
TABLET, FILM COATED ORAL
Qty: 90 TABLET | Refills: 0 | Status: SHIPPED | OUTPATIENT
Start: 2018-07-17 | End: 2018-07-20

## 2018-07-19 ENCOUNTER — PATIENT OUTREACH (OUTPATIENT)
Dept: OTHER | Facility: OTHER | Age: 58
End: 2018-07-19

## 2018-07-19 NOTE — PROGRESS NOTES
Last 5 Patient Entered Readings                                      Current 30 Day Average: 128/83     Recent Readings 7/26/2018 7/25/2018 7/23/2018 7/21/2018 7/16/2018    SBP (mmHg) 133 126 136 129 126    DBP (mmHg) 83 88 88 82 76    Pulse 75 73 94 80 78        Patient's BP average is slightly above goal of <130/80.     Patient denies s/s of hypotension (lightheadedness, dizziness, nausea, fatigue) associated with low readings. Instructed patient to inform me if this occurs, patient confirms understanding.      Patient denies s/s of hypertension (SOB, CP, severe headaches, changes in vision) associated with high readings. Instructed patient to go to the ED if BP > 180/110 and accompanied by hypertensive s/s, patient confirms understanding.    Will continue to monitor regularly. Will follow up in 2-3 weeks, sooner if BP begins to trend upward or downward.    Patient has my contact information and knows to call with any concerns or clinical changes.     Current HTN regimen:  Hypertension Medications             amLODIPine (NORVASC) 5 MG tablet Take 1 tablet (5 mg total) by mouth once daily.    losartan-hydrochlorothiazide 100-25 mg (HYZAAR) 100-25 mg per tablet Take 1 tablet by mouth once daily.    metoprolol succinate (TOPROL-XL) 100 MG 24 hr tablet Take 1 tablet (100 mg total) by mouth once daily.        7/19 - left voicemail  7/30 - Spoke with patient. Valsartan-hctz changed to losartan-hctz by PCP office. Advised patient to change losartan-hctz to 1/2 table BID. BP on slight upward trend. Consider increasing amlodipine if BP elevated on follow up.

## 2018-07-20 ENCOUNTER — TELEPHONE (OUTPATIENT)
Dept: FAMILY MEDICINE | Facility: CLINIC | Age: 58
End: 2018-07-20

## 2018-07-20 ENCOUNTER — PATIENT MESSAGE (OUTPATIENT)
Dept: FAMILY MEDICINE | Facility: CLINIC | Age: 58
End: 2018-07-20

## 2018-07-20 RX ORDER — LOSARTAN POTASSIUM AND HYDROCHLOROTHIAZIDE 25; 100 MG/1; MG/1
1 TABLET ORAL DAILY
Qty: 90 TABLET | Refills: 0 | Status: SHIPPED | OUTPATIENT
Start: 2018-07-20 | End: 2018-09-25

## 2018-07-20 NOTE — TELEPHONE ENCOUNTER
----- Message from Deysi Zhong sent at 7/20/2018  2:54 PM CDT -----  Contact: Self/ [phone]  VALSARTAN RECALL  Pt calling in regards to Valsartan recall confirmed by pharmacy. Please call to advise. Thank you.

## 2018-07-20 NOTE — TELEPHONE ENCOUNTER
Pt states she was told by pharmacy that valsartan is on backorder and needs to be changed to another medication; please send to [facility] on [address], please send pt message through portal letting her know what medication was sent in

## 2018-07-26 ENCOUNTER — PATIENT OUTREACH (OUTPATIENT)
Dept: OTHER | Facility: OTHER | Age: 58
End: 2018-07-26

## 2018-07-26 NOTE — PROGRESS NOTES
Last 5 Patient Entered Readings                                      Current 30 Day Average: 127/82     Recent Readings 7/25/2018 7/23/2018 7/21/2018 7/16/2018 7/12/2018    SBP (mmHg) 126 136 129 126 129    DBP (mmHg) 88 88 82 76 81    Pulse 73 94 80 78 79        7/26-Patient answered stating she was sleeping. Requested a phone call early next week.

## 2018-08-01 NOTE — PROGRESS NOTES
"Last 5 Patient Entered Readings                                      Current 30 Day Average: 128/83     Recent Readings 7/26/2018 7/25/2018 7/23/2018 7/21/2018 7/16/2018    SBP (mmHg) 133 126 136 129 126    DBP (mmHg) 83 88 88 82 76    Pulse 75 73 94 80 78        Very brief encounter. [place] Will call again in 2 weeks to further assess lifestyle factor changes.     Digital Medicine: Health  Follow Up    Lifestyle Modifications:    1.Dietary Modifications (Sodium intake <2,000mg/day, food labels, dining out): Patient reports trying to increase her fruit and vegetable intake. Patient reports the change in the medication is "a set back" in her diet. Encouraged patient to increase fruit and vegetable intake when she can.      2.Physical Activity: Patient reports using the treadmill 30 minutes, 3x/week. Patient reports trying the elliptical but feels its strenuous. Encouraged patient to continue increasing her exercise and take time adjusting to elliptical.     3.Medication Therapy: Patient has been compliant with the medication regimen.    4.Patient has the following medication side effects/concerns:   (Frequency/Alleviating factors/Precipitating factors, etc.)     Follow up with Ms. Cindy Billingsley completed. No further questions or concerns. Will continue follow up to achieve health goals.  "

## 2018-08-02 DIAGNOSIS — E11.42 TYPE 2 DIABETES MELLITUS WITH DIABETIC POLYNEUROPATHY, WITH LONG-TERM CURRENT USE OF INSULIN: ICD-10-CM

## 2018-08-02 DIAGNOSIS — Z79.4 TYPE 2 DIABETES MELLITUS WITH DIABETIC POLYNEUROPATHY, WITH LONG-TERM CURRENT USE OF INSULIN: ICD-10-CM

## 2018-08-02 RX ORDER — INSULIN GLARGINE 300 U/ML
INJECTION, SOLUTION SUBCUTANEOUS
Qty: 12 ML | Refills: 0 | Status: SHIPPED | OUTPATIENT
Start: 2018-08-02 | End: 2018-12-13

## 2018-08-03 DIAGNOSIS — F41.8 DEPRESSION WITH ANXIETY: ICD-10-CM

## 2018-08-03 RX ORDER — CLONAZEPAM 0.5 MG/1
0.5 TABLET ORAL 2 TIMES DAILY PRN
Qty: 60 TABLET | Refills: 2 | Status: SHIPPED | OUTPATIENT
Start: 2018-08-03 | End: 2018-12-13 | Stop reason: SDUPTHER

## 2018-08-14 ENCOUNTER — PATIENT OUTREACH (OUTPATIENT)
Dept: OTHER | Facility: OTHER | Age: 58
End: 2018-08-14

## 2018-08-14 NOTE — PROGRESS NOTES
Last 5 Patient Entered Readings                                      Current 30 Day Average: 130/84     Recent Readings 8/13/2018 8/10/2018 8/9/2018 8/8/2018 8/2/2018    SBP (mmHg) 130 127 130 135 127    DBP (mmHg) 80 79 80 87 84    Pulse 84 77 76 78 81          No answer and unable to leave voicemail. BP borderline. Discuss lifestyle changes vs. Adjusting medication.

## 2018-08-15 ENCOUNTER — PATIENT OUTREACH (OUTPATIENT)
Dept: OTHER | Facility: OTHER | Age: 58
End: 2018-08-15

## 2018-08-15 NOTE — PROGRESS NOTES
Last 5 Patient Entered Readings                                      Current 30 Day Average: 130/84     Recent Readings 8/13/2018 8/10/2018 8/9/2018 8/8/2018 8/2/2018    SBP (mmHg) 130 127 130 135 127    DBP (mmHg) 80 79 80 87 84    Pulse 84 77 76 78 81          Patient reports feeling tired since the new change in BP medication. Routed note to clinical pharmacist.     Digital Medicine: Health  Follow Up    Lifestyle Modifications:    1.Dietary Modifications (Sodium intake <2,000mg/day, food labels, dining out): Patient reports she has been snacking frequently. She states eating snacks from snack machine. Patient reports taking food in ziploc bag. Patient states needing to increase fruits and vegetable consumption. Encouraged patient to continue increasing her fresh fruit and vegetable consumption.     2.Physical Activity: Patient reports using the treadmill off and on. She states having work scheduling barriers that hinder establishing an exercise regimen. She reports feeling better after she has exercised-more energy. Encouraged patient to walk at least 10 minutes. She states she will try.      3.Medication Therapy: Patient has been compliant with the medication regimen.    4.Patient has the following medication side effects/concerns:   (Frequency/Alleviating factors/Precipitating factors, etc.)     Follow up with Mrs. White Jose Cruz completed. No further questions or concerns. Will continue follow up to achieve health goals.

## 2018-08-16 ENCOUNTER — TELEPHONE (OUTPATIENT)
Dept: ADMINISTRATIVE | Facility: HOSPITAL | Age: 58
End: 2018-08-16

## 2018-08-20 NOTE — PROGRESS NOTES
Last 5 Patient Entered Readings                                      Current 30 Day Average: 130/84     Recent Readings 8/18/2018 8/13/2018 8/10/2018 8/9/2018 8/8/2018    SBP (mmHg) 120 130 127 130 135    DBP (mmHg) 80 80 79 80 87    Pulse 73 84 77 76 78          Voicemail full; sent [facility] message. Address patient's fatigue. No recent increases to BP medications, however, losartan-hctz changed from once daily to 1/2 tablet twice daily dosing.

## 2018-08-29 ENCOUNTER — PATIENT OUTREACH (OUTPATIENT)
Dept: OTHER | Facility: OTHER | Age: 58
End: 2018-08-29

## 2018-08-29 NOTE — PROGRESS NOTES
Last 5 Patient Entered Readings                                      Current 30 Day Average: 127/82     Recent Readings 8/26/2018 8/24/2018 8/18/2018 8/13/2018 8/10/2018    SBP (mmHg) 124 121 120 130 127    DBP (mmHg) 84 80 80 80 79    Pulse 73 78 73 84 77        Patient states she is still experiencing fatigue since new BP medication change. Routed note to clinical pharmacist.     Patient asked about her bottom number being slightly elevated. Asked patient if she thinks she is adhering to a low-sodium diet. She reports she is trying.     Digital Medicine: Health  Follow Up    Lifestyle Modifications:    1.Dietary Modifications (Sodium intake <2,000mg/day, food labels, dining out): Patient reports she has not been the best with low-sodium diet. She states she has not been snacking often. She reports knowing she could increase meal prep and stay aware of amount of sodium in hopes to decrease sodium intake. Encouraged patient to start increasing meal prep.       2.Physical Activity: Deferred     3.Medication Therapy: Patient has been compliant with the medication regimen.    4.Patient has the following medication side effects/concerns:   (Frequency/Alleviating factors/Precipitating factors, etc.)     Follow up with Ms. Cindy Billingsley completed. No further questions or concerns. Will continue follow up to achieve health goals.

## 2018-08-30 NOTE — PROGRESS NOTES
Last 5 Patient Entered Readings                                      Current 30 Day Average: 127/82     Recent Readings 8/26/2018 8/24/2018 8/18/2018 8/13/2018 8/10/2018    SBP (mmHg) 124 121 120 130 127    DBP (mmHg) 84 80 80 80 79    Pulse 73 78 73 84 77          Patient's BP average is above goal of <130/80.     Patient denies s/s of hypotension (lightheadedness, dizziness, nausea, fatigue) associated with low readings. Instructed patient to inform me if this occurs, patient confirms understanding.      Patient denies s/s of hypertension (SOB, CP, severe headaches, changes in vision) associated with high readings. Instructed patient to go to the ED if BP > 180/110 and accompanied by hypertensive s/s, patient confirms understanding.    Will continue to monitor regularly. Will follow up in 1-2 weeks, sooner if BP begins to trend upward or downward.    Patient has my contact information and knows to call with any concerns or clinical changes.     Current HTN regimen:  Hypertension Medications             amLODIPine (NORVASC) 5 MG tablet Take 1 tablet (5 mg total) by mouth once daily.    losartan-hydrochlorothiazide 100-25 mg (HYZAAR) 100-25 mg per tablet Take 1 tablet by mouth once daily.    metoprolol succinate (TOPROL-XL) 100 MG 24 hr tablet Take 1 tablet (100 mg total) by mouth once daily.        Patient believes fatigue has been worse since valsartan changed to losartan. Admits that working nights is also likely contributing. Discussed changing losartan to irbesartan, or stopping ARB and continuing with thiazide diuretic and amlodipine only (increase amlodipine to 10 mg daily). Patient would like to think about choices and requests call back in 1-2 weeks.

## 2018-09-10 ENCOUNTER — PATIENT OUTREACH (OUTPATIENT)
Dept: OTHER | Facility: OTHER | Age: 58
End: 2018-09-10

## 2018-09-10 DIAGNOSIS — I10 ESSENTIAL HYPERTENSION: Primary | ICD-10-CM

## 2018-09-10 NOTE — PROGRESS NOTES
Last 5 Patient Entered Readings                                      Current 30 Day Average: 129/83     Recent Readings 9/9/2018 9/6/2018 9/2/2018 8/26/2018 8/24/2018    SBP (mmHg) 127 147 137 124 121    DBP (mmHg) 84 84 87 84 80    Pulse 80 99 80 73 78          Left voicemail

## 2018-09-12 ENCOUNTER — PATIENT OUTREACH (OUTPATIENT)
Dept: OTHER | Facility: OTHER | Age: 58
End: 2018-09-12

## 2018-09-17 NOTE — PROGRESS NOTES
Last 5 Patient Entered Readings                                      Current 30 Day Average: 130/83     Recent Readings 9/12/2018 9/12/2018 9/9/2018 9/6/2018 9/2/2018    SBP (mmHg) 133 128 127 147 137    DBP (mmHg) 85 74 84 84 87    Pulse 78 80 80 99 80          Left voicemail and send [employer] message. Follow up changing losartan to irbesartan, or stopping ARB and increasing amlodipine.

## 2018-09-19 NOTE — PROGRESS NOTES
Last 5 Patient Entered Readings                                      Current 30 Day Average: 133/83     Recent Readings 9/18/2018 9/12/2018 9/12/2018 9/9/2018 9/6/2018    SBP (mmHg) 142 133 128 127 147    DBP (mmHg) 74 85 74 84 84    Pulse 88 78 80 80 99          Digital Medicine: Health  Follow Up    Left voicemail to follow up with Ms. Cindy Billingsley.  Current BP average 133/83 mmHg is not at goal, [130/80].

## 2018-09-24 ENCOUNTER — NURSE TRIAGE (OUTPATIENT)
Dept: ADMINISTRATIVE | Facility: CLINIC | Age: 58
End: 2018-09-24

## 2018-09-24 ENCOUNTER — HOSPITAL ENCOUNTER (EMERGENCY)
Facility: HOSPITAL | Age: 58
Discharge: HOME | End: 2018-09-24
Attending: EMERGENCY MEDICINE
Payer: COMMERCIAL

## 2018-09-24 VITALS
SYSTOLIC BLOOD PRESSURE: 119 MMHG | RESPIRATION RATE: 18 BRPM | TEMPERATURE: 98 F | WEIGHT: 183 LBS | HEIGHT: 66 IN | OXYGEN SATURATION: 97 % | HEART RATE: 76 BPM | DIASTOLIC BLOOD PRESSURE: 70 MMHG | BODY MASS INDEX: 29.41 KG/M2

## 2018-09-24 DIAGNOSIS — R00.2 PALPITATIONS: ICD-10-CM

## 2018-09-24 DIAGNOSIS — R06.02 SHORTNESS OF BREATH: ICD-10-CM

## 2018-09-24 DIAGNOSIS — R06.02 SOB (SHORTNESS OF BREATH): ICD-10-CM

## 2018-09-24 LAB
ALBUMIN SERPL BCP-MCNC: 3.5 G/DL
ALP SERPL-CCNC: 131 U/L
ALT SERPL W/O P-5'-P-CCNC: 19 U/L
ANION GAP SERPL CALC-SCNC: 10 MMOL/L
AST SERPL-CCNC: 20 U/L
BASOPHILS # BLD AUTO: 0.08 K/UL
BASOPHILS NFR BLD: 1 %
BILIRUB SERPL-MCNC: 0.5 MG/DL
BUN SERPL-MCNC: 15 MG/DL
CALCIUM SERPL-MCNC: 9.8 MG/DL
CHLORIDE SERPL-SCNC: 105 MMOL/L
CO2 SERPL-SCNC: 23 MMOL/L
CREAT SERPL-MCNC: 0.8 MG/DL
DIFFERENTIAL METHOD: NORMAL
EOSINOPHIL # BLD AUTO: 0.2 K/UL
EOSINOPHIL NFR BLD: 2.6 %
ERYTHROCYTE [DISTWIDTH] IN BLOOD BY AUTOMATED COUNT: 13.5 %
EST. GFR  (AFRICAN AMERICAN): >60 ML/MIN/1.73 M^2
EST. GFR  (NON AFRICAN AMERICAN): >60 ML/MIN/1.73 M^2
GLUCOSE SERPL-MCNC: 161 MG/DL
HCT VFR BLD AUTO: 40.4 %
HGB BLD-MCNC: 13.6 G/DL
INR PPP: 1
LYMPHOCYTES # BLD AUTO: 2.9 K/UL
LYMPHOCYTES NFR BLD: 37.8 %
MCH RBC QN AUTO: 27.6 PG
MCHC RBC AUTO-ENTMCNC: 33.7 G/DL
MCV RBC AUTO: 82 FL
MONOCYTES # BLD AUTO: 0.8 K/UL
MONOCYTES NFR BLD: 9.9 %
NEUTROPHILS # BLD AUTO: 3.7 K/UL
NEUTROPHILS NFR BLD: 48.6 %
PLATELET # BLD AUTO: 259 K/UL
PMV BLD AUTO: 11.1 FL
POTASSIUM SERPL-SCNC: 4 MMOL/L
PROT SERPL-MCNC: 7 G/DL
PROTHROMBIN TIME: 10.6 SEC
RBC # BLD AUTO: 4.93 M/UL
SODIUM SERPL-SCNC: 138 MMOL/L
T4 FREE SERPL-MCNC: 0.78 NG/DL
TROPONIN I SERPL DL<=0.01 NG/ML-MCNC: 0.02 NG/ML
TSH SERPL DL<=0.005 MIU/L-ACNC: 1.46 UIU/ML
WBC # BLD AUTO: 7.68 K/UL

## 2018-09-24 PROCEDURE — 93010 ELECTROCARDIOGRAM REPORT: CPT | Mod: ,,, | Performed by: INTERNAL MEDICINE

## 2018-09-24 PROCEDURE — 84484 ASSAY OF TROPONIN QUANT: CPT | Mod: 91

## 2018-09-24 PROCEDURE — 84443 ASSAY THYROID STIM HORMONE: CPT

## 2018-09-24 PROCEDURE — 93005 ELECTROCARDIOGRAM TRACING: CPT

## 2018-09-24 PROCEDURE — 80053 COMPREHEN METABOLIC PANEL: CPT

## 2018-09-24 PROCEDURE — 85025 COMPLETE CBC W/AUTO DIFF WBC: CPT

## 2018-09-24 PROCEDURE — 85610 PROTHROMBIN TIME: CPT

## 2018-09-24 PROCEDURE — 84439 ASSAY OF FREE THYROXINE: CPT

## 2018-09-24 PROCEDURE — 99284 EMERGENCY DEPT VISIT MOD MDM: CPT | Mod: 25

## 2018-09-24 RX ORDER — ASPIRIN 325 MG
325 TABLET, DELAYED RELEASE (ENTERIC COATED) ORAL
Status: DISCONTINUED | OUTPATIENT
Start: 2018-09-24 | End: 2018-09-24 | Stop reason: HOSPADM

## 2018-09-24 NOTE — ED TRIAGE NOTES
Pt came to ED today with complaints of chest palpations and shortness of breath with exertion.  These symptoms began a week ago.  Pt states she does see a cardiologist, and was recently taken off of her blood pressure medicine, and it was replaced with a different medicine.  Pt reports her problems starting at that time, and they have become increasingly worse.  Pt is in no acute distress at this time, with stable vital signs.

## 2018-09-24 NOTE — DISCHARGE INSTRUCTIONS
Please make sure to see your PCP tomorrow for follow-up and arrange an outpatient stress in 72 hours, your labs and cardiac evaluation were negative, if you develop any worsening chest pain, or fevers, vomiting, or abdominal pain, or syncope, or SOB or palpitations, please return to the ER immediately for evaluation, please continue to maintain a healthy diet/exercise regimen and avoid smoking and excessive drinking

## 2018-09-24 NOTE — TELEPHONE ENCOUNTER
Spoke with patient. States that she has been having a face heart beat and when she gets up to walk or exerts any energy she experiences shortness of breath. Advised per Dr.Lombard to go to the emergency room. Verbalized understanding.

## 2018-09-24 NOTE — TELEPHONE ENCOUNTER
Reason for Disposition   Difficulty breathing    Protocols used: ST HEART RATE AND HEARTBEAT [id number]    Cindy was calling to schedule an appointment and was transferred to [facility] per .  Cindy reports palpitations with shortness of breath. Per protocol advised ER.  Cindy does not wish to go to ER and is requesting an appointment.  Please contact Cindy to advise.  She can be reached at [phone].

## 2018-09-24 NOTE — ED PROVIDER NOTES
"Encounter Date: 9/24/2018  SORT: This is a 57 y.o. female with HTN who presents for emergent consideration of chest pain and shortness of breath. Patient reports being changed from Valsartan to Losartan 1 week ago, and has been experiencing chest pain, SOB upon exertion and  palpitations since that time.  Patient will be moved to a room when one is available. Orders placed.  AMADA Herrmann PA-C        History     Chief Complaint   Patient presents with    Palpitations     pt reports feeling palpitaions that began  a week ago, pt reports that she began to feel palpitations after beginning Losartan    Shortness of Breath     pt reports that SOB began when the palpitations began, pt reports that when she excerts energy is when SOB is worse     HPI     This is a pleasant 58 y/o female with anxiety, DM type II and HTN and no pertinent PSHx presents to the ED for emergent evaluation of x1 wk hx of palpitations, SOB with exertion, and cough.    The patient consents for her friend, Chang, to be present during the examination. The patient states "it feels like my heart is fluttering and skipping a beat." The patient reports that her symptoms started after she started taking Losartan. The patient was taking Valsartan initially; however, due a drug recall, the patient was switched to Losartan. The patient has an appt with her cardiologist, Dr. Boston, tomorrow. She called her PCP this morning about her current symptoms and was advised to present to the ER. The patient had a heart study 6 months ago with no significant findings. The patient takes Klonopin for her hx of anxiety. The patient denies hx of MI, heart blockages, stroke, heart failure, asthma, COPD, cancer, stroke, stents, DVT, PE, or thyroid problems.     No fever, no chills, no N/V or GI complaints, no HA, no urinary complaints.     Of note, pt DENIES CHEST PAIN, this REFUTES the triage note and she confirms this, with main complaints of mild SOB and " palpitations. No drug use, no known thyroid issues.     No recent hospitalizations.     Review of patient's allergies indicates:   Allergen Reactions    Pristiq  [desvenlafaxine]      Other reaction(s): Nausea     Past Medical History:   Diagnosis Date    Anxiety     Arthritis     Depression     Diabetes mellitus type II     Diabetic peripheral neuropathy 9/10/2012    Facial spasm - right side 9/10/2012    History of irregular menstrual bleeding     Hypertension     Knee pain      Past Surgical History:   Procedure Laterality Date    breast reduction      COLONOSCOPY N/A 2017    Procedure: COLONOSCOPY;  Surgeon: Alan Acosta MD;  Location: [facility] END[facility];  Service: Endoscopy;  Laterality: N/A;  confirmed appt    COLONOSCOPY N/A 2017    Performed by Alan Acosta MD at [facility]    TOTAL REDUCTION MAMMOPLASTY      TUBAL LIGATION       Family History   Problem Relation Age of Onset    Heart disease Mother     Hypertension Mother     Diabetes Father     Breast cancer Neg Hx     Colon cancer Neg Hx     Stroke Neg Hx     Ovarian cancer Neg Hx     Melanoma Neg Hx     Cervical cancer Neg Hx     Endometrial cancer Neg Hx     Vaginal cancer Neg Hx      Social History     Tobacco Use    Smoking status: Former Smoker     Last attempt to quit: 1990     Years since quittin.7    Smokeless tobacco: Never Used    Tobacco comment: patient quit in    Substance Use Topics    Alcohol use: Yes     Alcohol/week: 0.0 oz     Comment: Occasional    Drug use: No     Review of Systems  All other review of systems is negative barring HPI   Physical Exam     Initial Vitals [18 0941]   BP Pulse Resp Temp SpO2   (!) 195/97 91 16 97.7 °F (36.5 °C) 96 %      MAP       --         Physical Exam  Constitutional: Pt appears well-developed and well-nourished. No distress.   HENT:   Head: Normocephalic and atraumatic.   Mouth/Throat: No oropharyngeal exudate.   Eyes: Conjunctivae and EOM  are normal. Pupils are equal, round, and reactive to light. No scleral icterus.   Neck: Normal range of motion. Neck supple. No JVD present.   Cardiovascular: Normal rate, regular rhythm and normal heart sounds. Exam reveals no gallop and no friction rub.    No murmur heard.  Pulmonary/Chest: Breath sounds normal. No stridor. No respiratory distress. Pt has no wheezes. Pt has no rhonchi.   Abdominal: Soft. Bowel sounds are normal. Pt exhibits no distension and no mass. There is no tenderness. There is no rebound and no guarding.   Musculoskeletal: Normal range of motion. Pt exhibits no edema or tenderness.   Lymphadenopathy:     Pt has no cervical adenopathy.   Neurological: Pt is alert and oriented to person, place, and time. Pt has normal strength and normal reflexes. Pt displays normal reflexes. No cranial nerve deficit or sensory deficit.   Skin: Skin is warm and dry. Capillary refill takes less than 2 seconds. No rash and no abscess noted. No erythema.     ED Course   Procedures  Labs Reviewed   COMPREHENSIVE METABOLIC PANEL - Abnormal; Notable for the following components:       Result Value    Glucose 161 (*)     All other components within normal limits    Narrative:     Recoll. [id number] by LAR at 09/24/2018 12:46, reason: Specimen   hemolyzed 09/24/2018  12:46   CBC W/ AUTO DIFFERENTIAL   TROPONIN I    Narrative:     Recoll. [id number] by LAR at 09/24/2018 12:46, reason: Specimen   hemolyzed 09/24/2018  12:46   PROTIME-INR    Narrative:     Recoll. [id number] by LAR at 09/24/2018 12:46, reason: Specimen   hemolyzed 09/24/2018  12:46   TROPONIN I    Narrative:     Recoll. [id number] by LAR at 09/24/2018 12:46, reason: Specimen   hemolyzed 09/24/2018  12:46   TSH    Narrative:     Recoll. [id number] by LAR at 09/24/2018 12:46, reason: Specimen   hemolyzed 09/24/2018  12:46   T4, FREE    Narrative:     Recoll. [id number] by LAR at 09/24/2018 12:46, reason: Specimen   hemolyzed 09/24/2018  12:46    TROPONIN I          Imaging Results          X-Ray Chest 1 View (Final result)  Result time 09/24/18 12:38:08    Final result by Tomas Josue MD (09/24/18 12:38:08)                 Impression:      1. No acute cardiopulmonary process, hypoventilatory exam.      Electronically signed by: Tomas Josue MD  Date:    09/24/2018  Time:    12:38             Narrative:    EXAMINATION:  XR CHEST 1 VIEW    CLINICAL HISTORY:  Shortness of breath    TECHNIQUE:  Single frontal view of the chest was performed.    COMPARISON:  01/15/2018    FINDINGS:  The cardiomediastinal silhouette is not enlarged, noting calcification of the aorta..  There is no pleural effusion.  The trachea is midline.  The lungs are symmetrically expanded bilaterally without evidence of acute parenchymal process. No large focal consolidation seen.  There is mild bilateral basilar subsegmental atelectasis.  There is no pneumothorax.  The osseous structures are remarkable for degenerative changes..                                                      Clinical Impression:   Diagnoses of Shortness of breath, SOB (shortness of breath), and Palpitations were pertinent to this visit.    MD NOTE, 12:02pm: EKG showed 88 bpm. NSR. NO STEMI.      MD NOTE, 12:33pm: Pt stable nad vss, pt presents as noted, will get two sets of troponins per being female, AA, and ACS risk factors, she would still qualify as low risk per HEART and MAE, and more reassuring she has a prior negative perfusion scan done 6mo ago, reassess    MD NOTE, 3:25pm: EKG showed 65 bpm. NSR. NO STEMI.     MD NOTE, 4:47pm:  Patient stable acute distress vital signs stable, patient has 2 negative troponins, CMP is unimpressive, INR 1.0, thyroid studies unremarkable, no leukocytosis, hemoglobin 13.6, chest x-ray no active disease, patient has 2-EKGs, her repeat physical exam is unimpressive, NO SOB or CP or Palpitations here, she has no events on the cardiac monitor, she has follow-up arranged  with her family doctor tomorrow, further she has a recent myocardial perfusion scan done last 6 months which is normal, otherwise she is also low risk per decisions course as noted above, she was stable on discharge, I also told her:    Please make sure to see your PCP tomorrow for follow-up and arrange an outpatient stress in 72 hours, your labs and cardiac evaluation were negative, if you develop any worsening chest pain, or fevers, vomiting, or abdominal pain, or syncope, or SOB or palpitations, please return to the ER immediately for evaluation, please continue to maintain a healthy diet/exercise regimen and avoid smoking and excessive drinking                Dena Rodrigez MD  09/24/18 1649       Dena Rodrigez MD  09/24/18 1651       Dena Rodrigez MD  09/24/18 [id number]

## 2018-09-25 ENCOUNTER — OFFICE VISIT (OUTPATIENT)
Dept: CARDIOLOGY | Facility: CLINIC | Age: 58
End: 2018-09-25
Payer: COMMERCIAL

## 2018-09-25 VITALS
RESPIRATION RATE: 15 BRPM | WEIGHT: 180 LBS | SYSTOLIC BLOOD PRESSURE: 132 MMHG | BODY MASS INDEX: 28.93 KG/M2 | HEART RATE: 84 BPM | HEIGHT: 66 IN | DIASTOLIC BLOOD PRESSURE: 74 MMHG | OXYGEN SATURATION: 97 %

## 2018-09-25 DIAGNOSIS — R00.2 HEART PALPITATIONS: ICD-10-CM

## 2018-09-25 DIAGNOSIS — R06.09 DOE (DYSPNEA ON EXERTION): Primary | ICD-10-CM

## 2018-09-25 DIAGNOSIS — E11.42 TYPE 2 DIABETES MELLITUS WITH DIABETIC POLYNEUROPATHY, WITH LONG-TERM CURRENT USE OF INSULIN: ICD-10-CM

## 2018-09-25 DIAGNOSIS — E78.2 MIXED HYPERLIPIDEMIA: ICD-10-CM

## 2018-09-25 DIAGNOSIS — Z79.4 TYPE 2 DIABETES MELLITUS WITH DIABETIC POLYNEUROPATHY, WITH LONG-TERM CURRENT USE OF INSULIN: ICD-10-CM

## 2018-09-25 DIAGNOSIS — G47.33 OSA (OBSTRUCTIVE SLEEP APNEA): ICD-10-CM

## 2018-09-25 DIAGNOSIS — I10 ESSENTIAL HYPERTENSION: ICD-10-CM

## 2018-09-25 PROCEDURE — 3078F DIAST BP <80 MM HG: CPT | Mod: CPTII,S$GLB,, | Performed by: INTERNAL MEDICINE

## 2018-09-25 PROCEDURE — 3008F BODY MASS INDEX DOCD: CPT | Mod: CPTII,S$GLB,, | Performed by: INTERNAL MEDICINE

## 2018-09-25 PROCEDURE — 99999 PR PBB SHADOW E&M-EST. PATIENT-LVL III: CPT | Mod: PBBFAC,,, | Performed by: INTERNAL MEDICINE

## 2018-09-25 PROCEDURE — 99214 OFFICE O/P EST MOD 30 MIN: CPT | Mod: S$GLB,,, | Performed by: INTERNAL MEDICINE

## 2018-09-25 PROCEDURE — 3045F PR MOST RECENT HEMOGLOBIN A1C LEVEL 7.0-9.0%: CPT | Mod: CPTII,S$GLB,, | Performed by: INTERNAL MEDICINE

## 2018-09-25 PROCEDURE — 3075F SYST BP GE 130 - 139MM HG: CPT | Mod: CPTII,S$GLB,, | Performed by: INTERNAL MEDICINE

## 2018-09-25 RX ORDER — AMLODIPINE BESYLATE 10 MG/1
10 TABLET ORAL DAILY
Qty: 10 TABLET | Refills: 3 | Status: SHIPPED | OUTPATIENT
Start: 2018-09-25 | End: 2018-10-12 | Stop reason: SDUPTHER

## 2018-09-25 NOTE — PROGRESS NOTES
CARDIOVASCULAR PROGRESS NOTE    REASON FOR CONSULT:   Cindy Billingsley is a 57 y.o. female who presents for f/u of HTN, palps/SOB.    PCP: [place]  HISTORY OF PRESENT ILLNESS:   The patient comes in today in follow-up of her recent ER visit for palpitations and associated dyspnea on exertion.  The patient tells me that her symptoms began after her valsartan was changed to losartan after valsartan recall.  She describes daily racing heartbeats with associated shortness of breath, particularly with ambulation.  She denies any anginal symptoms.  She otherwise has had no lightheadedness, dizziness, syncope, PND, orthopnea, or lower extremity edema.  There has been no melena, hematuria, or claudicant symptoms.    CARDIOVASCULAR HISTORY:   none    PAST MEDICAL HISTORY:     Past Medical History:   Diagnosis Date    Anxiety     Arthritis     Depression     Diabetes mellitus type II     Diabetic peripheral neuropathy 9/10/2012    Facial spasm - right side 9/10/2012    History of irregular menstrual bleeding     Hypertension     Knee pain        PAST SURGICAL HISTORY:     Past Surgical History:   Procedure Laterality Date    breast reduction      COLONOSCOPY N/A 11/16/2017    Procedure: COLONOSCOPY;  Surgeon: Alan Acosta MD;  Location: [facility];  Service: Endoscopy;  Laterality: N/A;  confirmed appt    COLONOSCOPY N/A 11/16/2017    Performed by Alan Acosta MD at [facility]    TOTAL REDUCTION MAMMOPLASTY      TUBAL LIGATION         ALLERGIES AND MEDICATION:     Review of patient's allergies indicates:   Allergen Reactions    Amlodipine      Other reaction(s): Edema    Pristiq  [desvenlafaxine]      Other reaction(s): Nausea        Medication List           Accurate as of 9/25/18 11:22 AM. If you have any questions, ask your nurse or doctor.               CHANGE how you take these medications    blood sugar diagnostic Strp  Commonly known as:  BLOOD GLUCOSE TEST  One touch  What changed:    · when to  "take this  · additional instructions     metFORMIN 1000 MG tablet  Commonly known as:  GLUCOPHAGE  TAKE ONE TABLET BY MOUTH TWICE DAILY WITH MEALS  What changed:  See the new instructions.        CONTINUE taking these medications    ADULT ASPIRIN EC LOW STRENGTH 81 MG EC tablet  Generic drug:  aspirin     amLODIPine 5 MG tablet  Commonly known as:  NORVASC  Take 1 tablet (5 mg total) by mouth once daily.     atorvastatin 40 MG tablet  Commonly known as:  LIPITOR  Take 1 tablet (40 mg total) by mouth every evening.     blood-glucose meter kit  Use as instructed     clonazePAM 0.5 MG tablet  Commonly known as:  KLONOPIN  Take 1 tablet (0.5 mg total) by mouth 2 (two) times daily as needed for Anxiety.     escitalopram oxalate 10 MG tablet  Commonly known as:  LEXAPRO  Take 1 tablet (10 mg total) by mouth once daily.     insulin lispro 100 unit/mL Inpn pen  Commonly known as:  HumaLOG KwikPen Insulin  Inject each meal plus scale 150-200 +2, 201-250 +4, 251-300 +6, 301-350 +8, >350 +10.     insulin syringe-needle U-100 1/2 mL 30 gauge x 5/16 Syrg     lancets Misc  Commonly known as:  MICROLET LANCET  Inject 1 each into the skin 3 (three) times daily.     liraglutide 0.6 mg/0.1 mL (18 mg/3 mL) subq PNIJ 0.6 mg/0.1 mL (18 mg/3 mL) Pnij  Commonly known as:  VICTOZA 3-ASIYA  Inject 1.8 mg into the skin once daily.     losartan-hydrochlorothiazide 100-25 mg 100-25 mg per tablet  Commonly known as:  HYZAAR  Take 1 tablet by mouth once daily.     metoprolol succinate 100 MG 24 hr tablet  Commonly known as:  TOPROL-XL  Take 1 tablet (100 mg total) by mouth once daily.     ONE DAILY MULTIVITAMIN per tablet  Generic drug:  multivitamin     pen needle, diabetic 31 gauge x 5/16" Ndle  Commonly known as:  PEN NEEDLE  USE ONE  SUBCUTANEOUSLY 4 TIMES DAILY     TOUJEO SOLOSTAR U-300 INSULIN 300 unit/mL (1.5 mL) Inpn pen  Generic drug:  insulin glargine (TOUJEO)  INJECT 48 UNITS SUBCUTANEOUSLY ONCE DAILY     vitamin E 400 UNIT capsule   "   zolpidem 5 MG Tab  Commonly known as:  AMBIEN  Take 1 tablet (5 mg total) by mouth every evening.              SOCIAL HISTORY:     Social History     Socioeconomic History    Marital status: Single     Spouse name: Not on file    Number of children: Not on file    Years of education: Not on file    Highest education level: Not on file   Social Needs    Financial resource strain: Not on file    Food insecurity - worry: Not on file    Food insecurity - inability: Not on file    Transportation needs - medical: Not on file    Transportation needs - non-medical: Not on file   Occupational History    Occupation: RN     Employer: [employer]   Tobacco Use    Smoking status: Former Smoker     Last attempt to quit: 1990     Years since quittin.7    Smokeless tobacco: Never Used    Tobacco comment: patient quit in    Substance and Sexual Activity    Alcohol use: Yes     Alcohol/week: 0.0 oz     Comment: Occasional    Drug use: No    Sexual activity: Not Currently     Birth control/protection: None, Rhythm   Other Topics Concern    Are you pregnant or think you may be? No    Breast-feeding No   Social History Narrative    Works night shift 7p - 7a at [employer] 3 x week; enrolled in [employer] school, single, 3 adult children       FAMILY HISTORY:     Family History   Problem Relation Age of Onset    Heart disease Mother     Hypertension Mother     Diabetes Father     Breast cancer Neg Hx     Colon cancer Neg Hx     Stroke Neg Hx     Ovarian cancer Neg Hx     Melanoma Neg Hx     Cervical cancer Neg Hx     Endometrial cancer Neg Hx     Vaginal cancer Neg Hx        REVIEW OF SYSTEMS:   Review of Systems   Constitutional: Negative for chills, diaphoresis and fever.   HENT: Negative for nosebleeds.    Eyes: Negative for blurred vision, double vision and photophobia.   Respiratory: Positive for shortness of breath. Negative for hemoptysis and wheezing.    Cardiovascular: Positive  "for palpitations. Negative for chest pain, orthopnea, claudication, leg swelling and PND.   Gastrointestinal: Negative for abdominal pain, blood in stool, heartburn, melena, nausea and vomiting.   Genitourinary: Negative for flank pain and hematuria.   Musculoskeletal: Negative for falls, myalgias and neck pain.   Skin: Negative for rash.   Neurological: Negative for dizziness, seizures, loss of consciousness, weakness and headaches.   Endo/Heme/Allergies: Negative for polydipsia. Does not bruise/bleed easily.   Psychiatric/Behavioral: Negative for depression and memory loss. The patient is not nervous/anxious.        PHYSICAL EXAM:     Vitals:    09/25/18 1115   BP: 132/74   Pulse: 84   Resp: 15    Body mass index is 29.05 kg/m².  Weight: 81.6 kg (180 lb)   Height: 5' 6" (167.6 cm)     Physical Exam   Constitutional: She is oriented to person, place, and time. She appears well-developed and well-nourished. She is cooperative.  Non-toxic appearance. No distress.   HENT:   Head: Normocephalic and atraumatic.   Eyes: Conjunctivae and EOM are normal. Pupils are equal, round, and reactive to light. No scleral icterus.   Neck: Trachea normal and normal range of motion. Neck supple. Normal carotid pulses and no JVD present. Carotid bruit is not present. No neck rigidity. No tracheal deviation and no edema present. No thyromegaly present.   Cardiovascular: Normal rate, regular rhythm, S1 normal and S2 normal. PMI is not displaced. Exam reveals no gallop and no friction rub.   No murmur heard.  Pulses:       Carotid pulses are 2+ on the right side, and 2+ on the left side.  Pulmonary/Chest: Effort normal and breath sounds normal. No stridor. No respiratory distress. She has no wheezes. She has no rales. She exhibits no tenderness.   Abdominal: Soft. She exhibits no distension. There is no hepatosplenomegaly.   Musculoskeletal: She exhibits no edema or tenderness.   Feet:   Right Foot:   Skin Integrity: Negative for ulcer. "   Left Foot:   Skin Integrity: Negative for ulcer.   Neurological: She is alert and oriented to person, place, and time. No cranial nerve deficit.   Skin: Skin is warm and dry. No rash noted. No erythema.   Psychiatric: She has a normal mood and affect. Her speech is normal and behavior is normal.   Vitals reviewed.      DATA:   EKG: (personally reviewed tracing)  9/24/18 SR 88, similar to 3/19/18     Laboratory:  CBC:  Recent Labs   Lab  12/12/17   0950  01/15/18   1130  09/24/18   1216   WHITE BLOOD CELL COUNT  7.99  7.60  7.68   HEMOGLOBIN  12.7  13.1  13.6   HEMATOCRIT  38.2  39.1  40.4   PLATELETS  246  248  259       CHEMISTRIES:  Recent Labs   Lab  12/12/17   0950  01/15/18   1130  09/24/18   1258   GLUCOSE  136 H  210 H  161 H   SODIUM  143  142  138   POTASSIUM  4.1  3.6  4.0   BUN BLD  18  16  15   CREATININE  0.9  0.9  0.8   EGFR IF   >60.0  >60  >60   EGFR IF NON-  >60.0  >60  >60   CALCIUM  9.8  9.9  9.8       CARDIAC BIOMARKERS:  Recent Labs   Lab  01/15/18   1555  01/15/18   1740  09/24/18   1258  09/24/18   1520   CPK  139  128   --    --    CPK MB  1.4  1.3   --    --    TROPONIN I  <0.006  0.008  0.019  0.019  0.017       COAGS:  Recent Labs   Lab  12/12/17   0950  01/15/18   1130  09/24/18   1258   INR  1.1  1.0  1.0       LIPIDS/LFTS:  Recent Labs   Lab  02/03/17   0740   01/15/18   1130  01/15/18   1835  03/19/18   0723  09/24/18   1258   CHOLESTEROL  174   --    --   191  127   --    TRIGLYCERIDES  215 H   --    --   141  96   --    HDL  35 L   --    --   47  39 L   --    LDL CHOLESTEROL  96.0   --    --   115.8  68.8   --    NON-HDL CHOLESTEROL  139   --    --   144  88   --    AST  19   < >  16   --   21  20   ALT  17   < >  19   --   27  19    < > = values in this interval not displayed.     Lab Results   Component Value Date    TSH 1.458 09/24/2018         Cardiovascular Testing:  L MPI 3/19/18  Nuclear Quantitative Functional Analysis:   LVEF: >= 70  %  Impression: NORMAL MYOCARDIAL PERFUSION  1. The perfusion scan is free of evidence for myocardial ischemia or injury.   2. Resting wall motion is physiologic.   3. Resting LV function is normal.   4. The ventricular volumes are normal at rest and stress.   5. The extracardiac distribution of radioactivity is normal.   6. There was no previous study available to compare.    Echo 1/15/18    1 - Normal left ventricular systolic function (EF 55-60%).     2 - Concentric remodeling.     3 - Impaired LV relaxation, elevated LAP (grade 2 diastolic dysfunction).     ASSESSMENT:   # Palps/MALDONADO, MPI 3/2018 normal.  Pt relates sxs beginning after valsartan changed to losartan.  She has not been taking the losartan for 1 week.  # HTN, controlled  # DM  # HLP, on atorva 40mg  # anxiety  # MONIQUE    PLAN:   Cont med rx  Removed losartan/HCTZ from med list  Inc amlod to 10mg qd  Check echo and Holter  Restart HCTZ if BP uncontrolled in the future (before trying a to start a different ARB)  Diet/exercise/weight loss, Na+ avoidance  RTC 1 month    Milton Boston MD, FACC

## 2018-10-01 ENCOUNTER — HOSPITAL ENCOUNTER (OUTPATIENT)
Dept: CARDIOLOGY | Facility: HOSPITAL | Age: 58
Discharge: HOME | End: 2018-10-01
Attending: INTERNAL MEDICINE
Payer: COMMERCIAL

## 2018-10-01 DIAGNOSIS — R00.2 HEART PALPITATIONS: ICD-10-CM

## 2018-10-01 DIAGNOSIS — R06.09 DOE (DYSPNEA ON EXERTION): ICD-10-CM

## 2018-10-01 PROCEDURE — 93226 XTRNL ECG REC<48 HR SCAN A/R: CPT

## 2018-10-01 PROCEDURE — 93227 XTRNL ECG REC<48 HR R&I: CPT | Mod: ,,, | Performed by: INTERNAL MEDICINE

## 2018-10-02 ENCOUNTER — HOSPITAL ENCOUNTER (OUTPATIENT)
Dept: CARDIOLOGY | Facility: HOSPITAL | Age: 58
Discharge: HOME | End: 2018-10-02
Attending: INTERNAL MEDICINE
Payer: COMMERCIAL

## 2018-10-02 VITALS — WEIGHT: 180 LBS | BODY MASS INDEX: 28.93 KG/M2 | HEIGHT: 66 IN

## 2018-10-02 DIAGNOSIS — R06.09 DOE (DYSPNEA ON EXERTION): ICD-10-CM

## 2018-10-02 DIAGNOSIS — R00.2 HEART PALPITATIONS: ICD-10-CM

## 2018-10-02 LAB
AORTIC ROOT ANNULUS: 2.79 CM
AORTIC VALVE CUSP SEPERATION: 1.82 CM
ASCENDING AORTA: 2.71 CM
AV MEAN GRADIENT: 3.29 MMHG
AV PEAK GRADIENT: 6.86 MMHG
AV VALVE AREA: 2.93 CM2
BSA FOR ECHO PROCEDURE: 1.95 M2
CV ECHO LV RWT: 0.42 CM
DOP CALC AO PEAK VEL: 1.31 M/S
DOP CALC AO VTI: 26.81 CM
DOP CALC LVOT AREA: 3.27 CM2
DOP CALC LVOT DIAMETER: 2.04 CM
DOP CALC LVOT STROKE VOLUME: 78.44 CM3
DOP CALCLVOT PEAK VEL VTI: 24.01 CM
E WAVE DECELERATION TIME: 121.54 MSEC
E/A RATIO: 1.21
E/E' RATIO: 16
ECHO LV POSTERIOR WALL: 0.99 CM (ref 0.6–1.1)
FRACTIONAL SHORTENING: 31 % (ref 28–44)
INTERVENTRICULAR SEPTUM: 0.94 CM (ref 0.6–1.1)
IVRT: 0.08 MSEC
LA MAJOR: 4.21 CM
LA MINOR: 4.72 CM
LA WIDTH: 3.34 CM
LEFT ATRIUM SIZE: 3.74 CM
LEFT ATRIUM VOLUME INDEX: 24.2 ML/M2
LEFT ATRIUM VOLUME: 47.25 CM3
LEFT INTERNAL DIMENSION IN SYSTOLE: 3.17 CM (ref 2.1–4)
LEFT VENTRICLE DIASTOLIC VOLUME INDEX: 49.84 ML/M2
LEFT VENTRICLE DIASTOLIC VOLUME: 97.18 ML
LEFT VENTRICLE MASS INDEX: 77.6 G/M2
LEFT VENTRICLE SYSTOLIC VOLUME INDEX: 20.5 ML/M2
LEFT VENTRICLE SYSTOLIC VOLUME: 40 ML
LEFT VENTRICULAR INTERNAL DIMENSION IN DIASTOLE: 4.6 CM (ref 3.5–6)
LEFT VENTRICULAR MASS: 151.28 G
LV LATERAL E/E' RATIO: 14.86
LV SEPTAL E/E' RATIO: 17.33
MV PEAK A VEL: 0.86 M/S
MV PEAK E VEL: 1.04 M/S
MV STENOSIS PRESSURE HALF TIME: 35.25 MS
MV VALVE AREA P 1/2 METHOD: 6.24 CM2
PISA TR MAX VEL: 2.41 M/S
PULM VEIN S/D RATIO: 1.69
PV PEAK D VEL: 0.36 M/S
PV PEAK GRADIENT: 2.73 MMHG
PV PEAK S VEL: 0.61 M/S
PV PEAK VELOCITY: 0.83 CM/S
RA MAJOR: 3.92 CM
RA PRESSURE: 3 MMHG
RA WIDTH: 3.35 CM
RETIRED EF AND QEF - SEE NOTES: 58.84 %
RIGHT VENTRICULAR END-DIASTOLIC DIMENSION: 3.29 CM
SINUS: 2.81 CM
STJ: 2.14 CM
TDI LATERAL: 0.07
TDI SEPTAL: 0.06
TDI: 0.07
TR MAX PG: 23.23 MMHG
TRICUSPID ANNULAR PLANE SYSTOLIC EXCURSION: 0.02 CM
TV REST PULMONARY ARTERY PRESSURE: 26.23 MMHG

## 2018-10-02 PROCEDURE — 93306 TTE W/DOPPLER COMPLETE: CPT

## 2018-10-02 PROCEDURE — 93306 TTE W/DOPPLER COMPLETE: CPT | Mod: 26,,, | Performed by: INTERNAL MEDICINE

## 2018-10-03 LAB
OHS CV HOLTER LENGTH DECIMAL HOURS: 23.98
OHS CV HOLTER LENGTH HOURS: 23
OHS CV HOLTER LENGTH MINUTES: 59

## 2018-10-03 NOTE — PROGRESS NOTES
Last 5 Patient Entered Readings                                      Current 30 Day Average: 133/81     Recent Readings 9/29/2018 9/24/2018 9/19/2018 9/18/2018 9/12/2018    SBP (mmHg) 134 119 131 142 133    DBP (mmHg) 86 70 85 74 85    Pulse 89 72 85 88 78        Patient mostly consisted of patient her ED visit and BP medication change post-visit.     Digital Medicine: Health  Follow Up    Lifestyle Modifications:    1.Dietary Modifications (Sodium intake <2,000mg/day, food labels, dining out): Patient reports wanting to return to salads. She states falling off the wagon. She expresses the biggest focus is returning to reading labels, searching for low-fat options, and increasing salad consumption. Will further assess dietary changes upon next encounter .     2.Physical Activity: Deferred    3.Medication Therapy: Patient has been compliant with the medication regimen.    4.Patient has the following medication side effects/concerns:   (Frequency/Alleviating factors/Precipitating factors, etc.)     Follow up with Ms. Cindy Billingsley completed. No further questions or concerns. Will continue to follow up to achieve health goals.

## 2018-10-08 DIAGNOSIS — E11.42 TYPE 2 DIABETES MELLITUS WITH DIABETIC POLYNEUROPATHY, WITH LONG-TERM CURRENT USE OF INSULIN: ICD-10-CM

## 2018-10-08 DIAGNOSIS — Z79.4 TYPE 2 DIABETES MELLITUS WITH DIABETIC POLYNEUROPATHY, WITH LONG-TERM CURRENT USE OF INSULIN: ICD-10-CM

## 2018-10-08 RX ORDER — INSULIN GLARGINE 300 U/ML
INJECTION, SOLUTION SUBCUTANEOUS
Qty: 12 ML | Refills: 0 | Status: SHIPPED | OUTPATIENT
Start: 2018-10-08 | End: 2018-12-13 | Stop reason: SDUPTHER

## 2018-10-08 NOTE — PROGRESS NOTES
Last 5 Patient Entered Readings                                      Current 30 Day Average: 134/82     Recent Readings 10/7/2018 10/7/2018 10/7/2018 10/6/2018 10/5/2018    SBP (mmHg) 140 154 132 138 143    DBP (mmHg) 93 87 77 87 78    Pulse 127 83 86 91 79          Left voicemail. Health  spoke with patient last week. Patient seen in ED 9/24. Losartan-hctz discontinued and amlodipine increased, per chart.

## 2018-10-12 ENCOUNTER — OFFICE VISIT (OUTPATIENT)
Dept: CARDIOLOGY | Facility: CLINIC | Age: 58
End: 2018-10-12
Payer: COMMERCIAL

## 2018-10-12 VITALS
WEIGHT: 183.63 LBS | HEART RATE: 79 BPM | OXYGEN SATURATION: 98 % | SYSTOLIC BLOOD PRESSURE: 138 MMHG | HEIGHT: 66 IN | BODY MASS INDEX: 29.51 KG/M2 | RESPIRATION RATE: 15 BRPM | DIASTOLIC BLOOD PRESSURE: 80 MMHG

## 2018-10-12 DIAGNOSIS — G47.33 OSA (OBSTRUCTIVE SLEEP APNEA): ICD-10-CM

## 2018-10-12 DIAGNOSIS — E11.42 TYPE 2 DIABETES MELLITUS WITH DIABETIC POLYNEUROPATHY, WITH LONG-TERM CURRENT USE OF INSULIN: ICD-10-CM

## 2018-10-12 DIAGNOSIS — I10 ESSENTIAL HYPERTENSION: Primary | ICD-10-CM

## 2018-10-12 DIAGNOSIS — E78.2 MIXED HYPERLIPIDEMIA: ICD-10-CM

## 2018-10-12 DIAGNOSIS — Z79.4 TYPE 2 DIABETES MELLITUS WITH DIABETIC POLYNEUROPATHY, WITH LONG-TERM CURRENT USE OF INSULIN: ICD-10-CM

## 2018-10-12 PROCEDURE — 99999 PR PBB SHADOW E&M-EST. PATIENT-LVL III: CPT | Mod: PBBFAC,,, | Performed by: INTERNAL MEDICINE

## 2018-10-12 PROCEDURE — 3079F DIAST BP 80-89 MM HG: CPT | Mod: CPTII,S$GLB,, | Performed by: INTERNAL MEDICINE

## 2018-10-12 PROCEDURE — 3075F SYST BP GE 130 - 139MM HG: CPT | Mod: CPTII,S$GLB,, | Performed by: INTERNAL MEDICINE

## 2018-10-12 PROCEDURE — 3008F BODY MASS INDEX DOCD: CPT | Mod: CPTII,S$GLB,, | Performed by: INTERNAL MEDICINE

## 2018-10-12 PROCEDURE — 3045F PR MOST RECENT HEMOGLOBIN A1C LEVEL 7.0-9.0%: CPT | Mod: CPTII,S$GLB,, | Performed by: INTERNAL MEDICINE

## 2018-10-12 PROCEDURE — 99214 OFFICE O/P EST MOD 30 MIN: CPT | Mod: S$GLB,,, | Performed by: INTERNAL MEDICINE

## 2018-10-12 RX ORDER — AMLODIPINE BESYLATE 5 MG/1
5 TABLET ORAL DAILY
Qty: 90 TABLET | Refills: 3
Start: 2018-10-12 | End: 2019-02-26 | Stop reason: SDUPTHER

## 2018-10-12 RX ORDER — HYDROCHLOROTHIAZIDE 25 MG/1
25 TABLET ORAL DAILY
Qty: 90 TABLET | Refills: 3 | Status: SHIPPED | OUTPATIENT
Start: 2018-10-12 | End: 2019-01-29 | Stop reason: SDUPTHER

## 2018-10-12 NOTE — PROGRESS NOTES
CARDIOVASCULAR PROGRESS NOTE    REASON FOR CONSULT:   Cindy Billingsley is a 57 y.o. female who presents for f/u of HTN, palps/SOB.    PCP: [place]  HISTORY OF PRESENT ILLNESS:   The patient returns for follow-up.  She tells me her palpitations have resolved.  Her echo and Holter were normal. She does report that she has developed some generalized fatigue and lower extremity edema with the 10 mg dose of amlodipine.  She otherwise denies lightheadedness, dizziness, or syncope.  There has been no PND, orthopnea, melena, hematuria, or claudicant symptoms.    CARDIOVASCULAR HISTORY:   none    PAST MEDICAL HISTORY:     Past Medical History:   Diagnosis Date    Anxiety     Arthritis     Depression     Diabetes mellitus type II     Diabetic peripheral neuropathy 9/10/2012    Facial spasm - right side 9/10/2012    History of irregular menstrual bleeding     Hypertension     Knee pain        PAST SURGICAL HISTORY:     Past Surgical History:   Procedure Laterality Date    breast reduction      COLONOSCOPY N/A 11/16/2017    Procedure: COLONOSCOPY;  Surgeon: Alan Acosta MD;  Location: [facility];  Service: Endoscopy;  Laterality: N/A;  confirmed appt    COLONOSCOPY N/A 11/16/2017    [facility]    TOTAL REDUCTION MAMMOPLASTY      TUBAL LIGATION         ALLERGIES AND MEDICATION:     Review of patient's allergies indicates:   Allergen Reactions    Amlodipine      Other reaction(s): Edema    Pristiq  [desvenlafaxine]      Other reaction(s): Nausea        Medication List           Accurate as of 10/12/18  3:44 PM. If you have any questions, ask your nurse or doctor.               CHANGE how you take these medications    blood sugar diagnostic Strp  Commonly known as:  BLOOD GLUCOSE TEST  One touch  What changed:    · when to take this  · additional instructions     metFORMIN 1000 MG tablet  Commonly known as:  GLUCOPHAGE  TAKE ONE TABLET BY MOUTH TWICE DAILY WITH MEALS  What  "changed:  See the new instructions.        CONTINUE taking these medications    ADULT ASPIRIN EC LOW STRENGTH 81 MG EC tablet  Generic drug:  aspirin     amLODIPine 10 MG tablet  Commonly known as:  NORVASC  Take 1 tablet (10 mg total) by mouth once daily.     atorvastatin 40 MG tablet  Commonly known as:  LIPITOR  Take 1 tablet (40 mg total) by mouth every evening.     blood-glucose meter kit  Use as instructed     clonazePAM 0.5 MG tablet  Commonly known as:  KLONOPIN  Take 1 tablet (0.5 mg total) by mouth 2 (two) times daily as needed for Anxiety.     escitalopram oxalate 10 MG tablet  Commonly known as:  LEXAPRO  Take 1 tablet (10 mg total) by mouth once daily.     insulin lispro 100 unit/mL Inpn pen  Commonly known as:  HumaLOG KwikPen Insulin  Inject each meal plus scale 150-200 +2, 201-250 +4, 251-300 +6, 301-350 +8, >350 +10.     insulin syringe-needle U-100 1/2 mL 30 gauge x 5/16 Syrg     lancets Misc  Commonly known as:  MICROLET LANCET  Inject 1 each into the skin 3 (three) times daily.     liraglutide 0.6 mg/0.1 mL (18 mg/3 mL) subq PNIJ 0.6 mg/0.1 mL (18 mg/3 mL) Pnij  Commonly known as:  VICTOZA 3-AISYA  Inject 1.8 mg into the skin once daily.     metoprolol succinate 100 MG 24 hr tablet  Commonly known as:  TOPROL-XL  Take 1 tablet (100 mg total) by mouth once daily.     ONE DAILY MULTIVITAMIN per tablet  Generic drug:  multivitamin     pen needle, diabetic 31 gauge x 5/16" Ndle  Commonly known as:  PEN NEEDLE  USE ONE  SUBCUTANEOUSLY 4 TIMES DAILY     * TOUJEO SOLOSTAR U-300 INSULIN 300 unit/mL (1.5 mL) Inpn pen  Generic drug:  insulin glargine (TOUJEO)  INJECT 48 UNITS SUBCUTANEOUSLY ONCE DAILY     * TOUJEO SOLOSTAR U-300 INSULIN 300 unit/mL (1.5 mL) Inpn pen  Generic drug:  insulin glargine (TOUJEO)  INJECT 48 UNITS SUBCUTANEOUSLY ONCE DAILY     vitamin E 400 UNIT capsule     zolpidem 5 MG Tab  Commonly known as:  AMBIEN  Take 1 tablet (5 mg total) by mouth every evening.         * This list has 2 " medication(s) that are the same as other medications prescribed for you. Read the directions carefully, and ask your doctor or other care provider to review them with you.                  SOCIAL HISTORY:     Social History     Socioeconomic History    Marital status: Single     Spouse name: Not on file    Number of children: Not on file    Years of education: Not on file    Highest education level: Not on file   Social Needs    Financial resource strain: Not on file    Food insecurity - worry: Not on file    Food insecurity - inability: Not on file    Transportation needs - medical: Not on file    Transportation needs - non-medical: Not on file   Occupational History    Occupation: RN     Employer: [employer]   Tobacco Use    Smoking status: Former Smoker     Last attempt to quit: 1990     Years since quittin.7    Smokeless tobacco: Never Used    Tobacco comment: patient quit in    Substance and Sexual Activity    Alcohol use: Yes     Alcohol/week: 0.0 oz     Comment: Occasional    Drug use: No    Sexual activity: Not Currently     Birth control/protection: None, Rhythm   Other Topics Concern    Are you pregnant or think you may be? No    Breast-feeding No   Social History Narrative    Works night shift 7p - 7a at [employer] 3 x week; enrolled in [employer] school, single, 3 adult children       FAMILY HISTORY:     Family History   Problem Relation Age of Onset    Heart disease Mother     Hypertension Mother     Diabetes Father     Breast cancer Neg Hx     Colon cancer Neg Hx     Stroke Neg Hx     Ovarian cancer Neg Hx     Melanoma Neg Hx     Cervical cancer Neg Hx     Endometrial cancer Neg Hx     Vaginal cancer Neg Hx        REVIEW OF SYSTEMS:   Review of Systems   Constitutional: Negative for chills, diaphoresis and fever.   HENT: Negative for nosebleeds.    Eyes: Negative for blurred vision, double vision and photophobia.   Respiratory: Negative for hemoptysis,  "shortness of breath and wheezing.    Cardiovascular: Positive for leg swelling. Negative for chest pain, palpitations, orthopnea, claudication and PND.   Gastrointestinal: Negative for abdominal pain, blood in stool, heartburn, melena, nausea and vomiting.   Genitourinary: Negative for flank pain and hematuria.   Musculoskeletal: Negative for falls, myalgias and neck pain.   Skin: Negative for rash.   Neurological: Negative for dizziness, seizures, loss of consciousness, weakness and headaches.   Endo/Heme/Allergies: Negative for polydipsia. Does not bruise/bleed easily.   Psychiatric/Behavioral: Negative for depression and memory loss. The patient is not nervous/anxious.        PHYSICAL EXAM:     Vitals:    10/12/18 1540   BP: 138/80   Pulse: 79   Resp: 15    Body mass index is 29.64 kg/m².  Weight: 83.3 kg (183 lb 10.3 oz)   Height: 5' 6" (167.6 cm)     Physical Exam   Constitutional: She is oriented to person, place, and time. She appears well-developed and well-nourished. She is cooperative.  Non-toxic appearance. No distress.   HENT:   Head: Normocephalic and atraumatic.   Eyes: Conjunctivae and EOM are normal. Pupils are equal, round, and reactive to light. No scleral icterus.   Neck: Trachea normal and normal range of motion. Neck supple. Normal carotid pulses and no JVD present. Carotid bruit is not present. No neck rigidity. No tracheal deviation and no edema present. No thyromegaly present.   Cardiovascular: Normal rate, regular rhythm, S1 normal and S2 normal. PMI is not displaced. Exam reveals no gallop and no friction rub.   No murmur heard.  Pulses:       Carotid pulses are 2+ on the right side, and 2+ on the left side.  Pulmonary/Chest: Effort normal and breath sounds normal. No stridor. No respiratory distress. She has no wheezes. She has no rales. She exhibits no tenderness.   Abdominal: Soft. She exhibits no distension. There is no hepatosplenomegaly.   Musculoskeletal: She exhibits no edema or " tenderness.   Feet:   Right Foot:   Skin Integrity: Negative for ulcer.   Left Foot:   Skin Integrity: Negative for ulcer.   Neurological: She is alert and oriented to person, place, and time. No cranial nerve deficit.   Skin: Skin is warm and dry. No rash noted. No erythema.   Psychiatric: She has a normal mood and affect. Her speech is normal and behavior is normal.   Vitals reviewed.      DATA:   EKG: (personally reviewed tracing)  9/24/18 SR 88, similar to 3/19/18     Laboratory:  CBC:  Recent Labs   Lab  12/12/17   0950  01/15/18   1130  09/24/18   1216   WHITE BLOOD CELL COUNT  7.99  7.60  7.68   HEMOGLOBIN  12.7  13.1  13.6   HEMATOCRIT  38.2  39.1  40.4   PLATELETS  246  248  259       CHEMISTRIES:  Recent Labs   Lab  12/12/17   0950  01/15/18   1130  09/24/18   1258   GLUCOSE  136 H  210 H  161 H   SODIUM  143  142  138   POTASSIUM  4.1  3.6  4.0   BUN BLD  18  16  15   CREATININE  0.9  0.9  0.8   EGFR IF   >60.0  >60  >60   EGFR IF NON-  >60.0  >60  >60   CALCIUM  9.8  9.9  9.8       CARDIAC BIOMARKERS:  Recent Labs   Lab  01/15/18   1555  01/15/18   1740  09/24/18   1258  09/24/18   1520   CPK  139  128   --    --    CPK MB  1.4  1.3   --    --    TROPONIN I  <0.006  0.008  0.019  0.019  0.017       COAGS:  Recent Labs   Lab  12/12/17   0950  01/15/18   1130  09/24/18   1258   INR  1.1  1.0  1.0       LIPIDS/LFTS:  Recent Labs   Lab  02/03/17   0740   01/15/18   1130  01/15/18   1835  03/19/18   0723  09/24/18   1258   CHOLESTEROL  174   --    --   191  127   --    TRIGLYCERIDES  215 H   --    --   141  96   --    HDL  35 L   --    --   47  39 L   --    LDL CHOLESTEROL  96.0   --    --   115.8  68.8   --    NON-HDL CHOLESTEROL  139   --    --   144  88   --    AST  19   < >  16   --   21  20   ALT  17   < >  19   --   27  19    < > = values in this interval not displayed.     Lab Results   Component Value Date    TSH 1.458 09/24/2018         Cardiovascular Testing:  Echo  10/2/18  · Left ventricle ejection fraction is normal at 60%  · Normal LV diastolic function.  · Mitral valve shows mild regurgitation.  · Tricuspid valve shows trace regurgitation.    Holter 10/1/18  · Predominantly NSR  · 2 PVCs  · 3 PACs    L MPI 3/19/18  Nuclear Quantitative Functional Analysis:   LVEF: >= 70 %  Impression: NORMAL MYOCARDIAL PERFUSION  1. The perfusion scan is free of evidence for myocardial ischemia or injury.   2. Resting wall motion is physiologic.   3. Resting LV function is normal.   4. The ventricular volumes are normal at rest and stress.   5. The extracardiac distribution of radioactivity is normal.   6. There was no previous study available to compare.    ASSESSMENT:   # Palps, resolved.  Echo/Holter 10/2018 normal.  # HTN, borderline controlled but pt with LE edema with 10mg dose of amlod.  # DM  # HLP, on atorva 40mg  # anxiety  # MONIQUE    PLAN:   Cont med rx  Dec amlod to 5mg qd (LE edema with 10mg dose)  Add HCTZ 25mg qd  Check BMP 10/19/18  Diet/exercise/weight loss, Na+ avoidance  RTC 3 months    Milton Boston MD, FACC

## 2018-10-17 ENCOUNTER — PATIENT OUTREACH (OUTPATIENT)
Dept: OTHER | Facility: OTHER | Age: 58
End: 2018-10-17

## 2018-10-17 NOTE — PROGRESS NOTES
Last 5 Patient Entered Readings                                      Current 30 Day Average: 136/82     Recent Readings 10/12/2018 10/12/2018 10/7/2018 10/7/2018 10/7/2018    SBP (mmHg) 138 135 140 154 132    DBP (mmHg) 80 83 93 87 77    Pulse 84 85 127 83 86          10/17-Patient answered stating she was running errands and requested a call on 10/18 before 12 pm.

## 2018-10-18 NOTE — PROGRESS NOTES
Last 5 Patient Entered Readings                                      Current 30 Day Average: 136/82     Recent Readings 10/12/2018 10/12/2018 10/7/2018 10/7/2018 10/7/2018    SBP (mmHg) 138 135 140 154 132    DBP (mmHg) 80 83 93 87 77    Pulse 84 85 127 83 86        Patient also discussed previous BP medication changes and MD appointment.    Digital Medicine: Health  Follow Up    Lifestyle Modifications:    1.Dietary Modifications (Sodium intake <2,000mg/day, food labels, dining out): Patient reports she has been trying to increase salad consumption. She states that's all she can do. Patient expresses a desire to continue working on incorporating salads. She expresses a desire to work towards a goal of eating at least 1 salad per week. Will further assess patient progress on eating 1 salad per day.      2.Physical Activity: She states not attending the gym as much as she would like. Patient reports wanting to focus on diet. Will further assess physical activity upon next encounter.      3.Medication Therapy: Patient has been compliant with the medication regimen.    4.Patient has the following medication side effects/concerns:   (Frequency/Alleviating factors/Precipitating factors, etc.)     Follow up with Mrs. White Jose Cruz completed. No further questions or concerns. Will continue to follow up to achieve health goals.

## 2018-10-19 ENCOUNTER — LAB VISIT (OUTPATIENT)
Dept: LAB | Facility: HOSPITAL | Age: 58
End: 2018-10-19
Attending: NURSE PRACTITIONER
Payer: COMMERCIAL

## 2018-10-19 DIAGNOSIS — I10 ESSENTIAL HYPERTENSION: ICD-10-CM

## 2018-10-19 DIAGNOSIS — Z79.4 TYPE 2 DIABETES MELLITUS WITH DIABETIC POLYNEUROPATHY, WITH LONG-TERM CURRENT USE OF INSULIN: ICD-10-CM

## 2018-10-19 DIAGNOSIS — E11.42 TYPE 2 DIABETES MELLITUS WITH DIABETIC POLYNEUROPATHY, WITH LONG-TERM CURRENT USE OF INSULIN: ICD-10-CM

## 2018-10-19 LAB
ANION GAP SERPL CALC-SCNC: 7 MMOL/L
BUN SERPL-MCNC: 18 MG/DL
CALCIUM SERPL-MCNC: 9.7 MG/DL
CHLORIDE SERPL-SCNC: 103 MMOL/L
CO2 SERPL-SCNC: 27 MMOL/L
CREAT SERPL-MCNC: 1 MG/DL
EST. GFR  (AFRICAN AMERICAN): >60 ML/MIN/1.73 M^2
EST. GFR  (NON AFRICAN AMERICAN): >60 ML/MIN/1.73 M^2
ESTIMATED AVG GLUCOSE: 174 MG/DL
GLUCOSE SERPL-MCNC: 225 MG/DL
HBA1C MFR BLD HPLC: 7.7 %
POTASSIUM SERPL-SCNC: 4.4 MMOL/L
SODIUM SERPL-SCNC: 137 MMOL/L

## 2018-10-19 PROCEDURE — 36415 COLL VENOUS BLD VENIPUNCTURE: CPT | Mod: PO

## 2018-10-19 PROCEDURE — 83036 HEMOGLOBIN GLYCOSYLATED A1C: CPT

## 2018-10-19 PROCEDURE — 80048 BASIC METABOLIC PNL TOTAL CA: CPT

## 2018-10-22 NOTE — PROGRESS NOTES
Last 5 Patient Entered Readings                                      Current 30 Day Average: 135/82     Recent Readings 10/19/2018 10/19/2018 10/12/2018 10/12/2018 10/7/2018    SBP (mmHg) 135 137 138 135 140    DBP (mmHg) 80 85 80 83 93    Pulse 86 79 84 85 127          Left voicemail and sent [facility] message. Patient saw Dr. Boston 10/12, Dec amlod to 5mg qd (LE edema with 10mg dose), Add HCTZ 25mg qd, Check BMP 10/19/18. BMP reviewed and WNL. Consider changing hctz to chlorthalidone.

## 2018-11-02 RX ORDER — VALSARTAN 320 MG/1
320 TABLET ORAL DAILY
Qty: 30 TABLET | Refills: 2 | Status: SHIPPED | OUTPATIENT
Start: 2018-11-02 | End: 2019-01-29 | Stop reason: SDUPTHER

## 2018-11-02 NOTE — PROGRESS NOTES
Last 5 Patient Entered Readings                                      Current 30 Day Average: 140/86     Recent Readings 11/1/2018 10/31/2018 10/31/2018 10/30/2018 10/26/2018    SBP (mmHg) 138 152 148 131 141    DBP (mmHg) 85 97 95 91 83    Pulse 98 90 97 83 93          Patient's BP average is above goal of <130/80.     Patient denies s/s of hypotension (lightheadedness, dizziness, nausea, fatigue) associated with low readings. Instructed patient to inform me if this occurs, patient confirms understanding.      Patient denies s/s of hypertension (SOB, CP, severe headaches, changes in vision) associated with high readings. Instructed patient to go to the ED if BP > 180/110 and accompanied by hypertensive s/s, patient confirms understanding.    Patient reports MCKAYLA improved with amlodipine dose reduction. Discussed medication options, [facility]; confirmed having medication in stock. Will likely consider combining valsartan and hctz on next fill to decrease pill burden.    Will continue to monitor regularly. Will follow up in 2-3 weeks, sooner if BP begins to trend upward or downward.    Patient has my contact information and knows to call with any concerns or clinical changes.     Current HTN regimen:  Hypertension Medications             amLODIPine (NORVASC) 5 MG tablet Take 1 tablet (5 mg total) by mouth once daily.    hydroCHLOROthiazide (HYDRODIURIL) 25 MG tablet Take 1 tablet (25 mg total) by mouth once daily.    metoprolol succinate (TOPROL-XL) 100 MG 24 hr tablet Take 1 tablet (100 mg total) by mouth once daily.

## 2018-11-06 DIAGNOSIS — E11.42 TYPE 2 DIABETES MELLITUS WITH DIABETIC POLYNEUROPATHY, WITH LONG-TERM CURRENT USE OF INSULIN: ICD-10-CM

## 2018-11-06 DIAGNOSIS — Z79.4 TYPE 2 DIABETES MELLITUS WITH DIABETIC POLYNEUROPATHY, WITH LONG-TERM CURRENT USE OF INSULIN: ICD-10-CM

## 2018-11-06 RX ORDER — INSULIN LISPRO 100 [IU]/ML
INJECTION, SOLUTION INTRAVENOUS; SUBCUTANEOUS
Qty: 45 ML | Refills: 2 | Status: SHIPPED | OUTPATIENT
Start: 2018-11-06 | End: 2019-05-21 | Stop reason: SDUPTHER

## 2018-11-07 ENCOUNTER — PATIENT OUTREACH (OUTPATIENT)
Dept: OTHER | Facility: OTHER | Age: 58
End: 2018-11-07

## 2018-11-07 NOTE — PROGRESS NOTES
Last 5 Patient Entered Readings                                      Current 30 Day Average: 139/87     Recent Readings 11/7/2018 11/3/2018 11/2/2018 11/1/2018 10/31/2018    SBP (mmHg) 144 135 139 138 152    DBP (mmHg) 91 86 92 85 97    Pulse 89 80 80 98 90          11/7: LVM.  Will call in 1 week.

## 2018-11-14 NOTE — PROGRESS NOTES
Last 5 Patient Entered Readings                                      Current 30 Day Average: 134/88     Recent Readings 11/12/2018 11/9/2018 11/7/2018 11/7/2018 11/3/2018    SBP (mmHg) 127 125 118 144 135    DBP (mmHg) 86 83 84 91 86    Pulse 73 89 81 89 80          11/14: Completed new health  introduction.  Reports no change in diet or exercise.  No questions or concerns at this time.  Patient works nights.

## 2018-11-15 RX ORDER — LIRAGLUTIDE 6 MG/ML
INJECTION SUBCUTANEOUS
Qty: 2 SYRINGE | Refills: 6 | Status: SHIPPED | OUTPATIENT
Start: 2018-11-15 | End: 2019-04-08 | Stop reason: SDUPTHER

## 2018-11-16 ENCOUNTER — PATIENT MESSAGE (OUTPATIENT)
Dept: FAMILY MEDICINE | Facility: CLINIC | Age: 58
End: 2018-11-16

## 2018-11-16 DIAGNOSIS — F51.02 INSOMNIA DUE TO STRESS: ICD-10-CM

## 2018-11-19 ENCOUNTER — PATIENT OUTREACH (OUTPATIENT)
Dept: OTHER | Facility: OTHER | Age: 58
End: 2018-11-19

## 2018-11-19 RX ORDER — ZOLPIDEM TARTRATE 5 MG/1
5 TABLET ORAL NIGHTLY
Qty: 30 TABLET | Refills: 0 | Status: SHIPPED | OUTPATIENT
Start: 2018-11-19 | End: 2018-12-27 | Stop reason: SDUPTHER

## 2018-11-19 NOTE — PROGRESS NOTES
Last 5 Patient Entered Readings                                      Current 30 Day Average: 134/88     Recent Readings 11/12/2018 11/9/2018 11/7/2018 11/7/2018 11/3/2018    SBP (mmHg) 127 125 118 144 135    DBP (mmHg) 86 83 84 91 86    Pulse 73 89 81 89 80          Left voicemail. Follow up on change back to valsartan. BP improving.

## 2018-12-06 NOTE — PROGRESS NOTES
Last 5 Patient Entered Readings                                      Current 30 Day Average: 129/82     Recent Readings 12/3/2018 12/1/2018 11/30/2018 11/27/2018 11/24/2018    SBP (mmHg) 144 138 132 134 113    DBP (mmHg) 84 84 81 77 66    Pulse 75 90 88 85 81          Left voicemail. Follow up change back to valsartan. BP was initially improving bur recent readings elevated.

## 2018-12-12 ENCOUNTER — PATIENT OUTREACH (OUTPATIENT)
Dept: OTHER | Facility: OTHER | Age: 58
End: 2018-12-12

## 2018-12-12 NOTE — PROGRESS NOTES
Last 5 Patient Entered Readings                                      Current 30 Day Average: 130/83     Recent Readings 12/10/2018 12/9/2018 12/7/2018 12/3/2018 12/1/2018    SBP (mmHg) 123 131 131 144 138    DBP (mmHg) 88 90 90 84 84    Pulse 83 84 84 75 90          12/12: LVM.  Will call in 1 week.

## 2018-12-13 ENCOUNTER — OFFICE VISIT (OUTPATIENT)
Dept: FAMILY MEDICINE | Facility: CLINIC | Age: 58
End: 2018-12-13
Payer: COMMERCIAL

## 2018-12-13 VITALS
HEIGHT: 66 IN | BODY MASS INDEX: 29.44 KG/M2 | WEIGHT: 183.19 LBS | SYSTOLIC BLOOD PRESSURE: 114 MMHG | OXYGEN SATURATION: 96 % | RESPIRATION RATE: 17 BRPM | HEART RATE: 78 BPM | DIASTOLIC BLOOD PRESSURE: 80 MMHG | TEMPERATURE: 98 F

## 2018-12-13 DIAGNOSIS — Z79.4 TYPE 2 DIABETES MELLITUS WITH DIABETIC POLYNEUROPATHY, WITH LONG-TERM CURRENT USE OF INSULIN: Primary | ICD-10-CM

## 2018-12-13 DIAGNOSIS — F41.8 DEPRESSION WITH ANXIETY: ICD-10-CM

## 2018-12-13 DIAGNOSIS — L30.9 DERMATITIS: ICD-10-CM

## 2018-12-13 DIAGNOSIS — I10 ESSENTIAL HYPERTENSION: ICD-10-CM

## 2018-12-13 DIAGNOSIS — E11.42 TYPE 2 DIABETES MELLITUS WITH DIABETIC POLYNEUROPATHY, WITH LONG-TERM CURRENT USE OF INSULIN: Primary | ICD-10-CM

## 2018-12-13 PROCEDURE — 3045F PR MOST RECENT HEMOGLOBIN A1C LEVEL 7.0-9.0%: CPT | Mod: CPTII,S$GLB,, | Performed by: FAMILY MEDICINE

## 2018-12-13 PROCEDURE — 99215 OFFICE O/P EST HI 40 MIN: CPT | Mod: S$GLB,,, | Performed by: FAMILY MEDICINE

## 2018-12-13 PROCEDURE — 3074F SYST BP LT 130 MM HG: CPT | Mod: CPTII,S$GLB,, | Performed by: FAMILY MEDICINE

## 2018-12-13 PROCEDURE — 3008F BODY MASS INDEX DOCD: CPT | Mod: CPTII,S$GLB,, | Performed by: FAMILY MEDICINE

## 2018-12-13 PROCEDURE — 3079F DIAST BP 80-89 MM HG: CPT | Mod: CPTII,S$GLB,, | Performed by: FAMILY MEDICINE

## 2018-12-13 PROCEDURE — 99999 PR PBB SHADOW E&M-EST. PATIENT-LVL III: CPT | Mod: PBBFAC,,, | Performed by: FAMILY MEDICINE

## 2018-12-13 RX ORDER — TRIAMCINOLONE ACETONIDE 5 MG/G
OINTMENT TOPICAL 2 TIMES DAILY
Qty: 30 G | Refills: 1 | Status: SHIPPED | OUTPATIENT
Start: 2018-12-13 | End: 2019-02-11 | Stop reason: SDUPTHER

## 2018-12-13 RX ORDER — CLONAZEPAM 0.5 MG/1
0.5 TABLET ORAL 2 TIMES DAILY PRN
Qty: 60 TABLET | Refills: 2 | Status: SHIPPED | OUTPATIENT
Start: 2018-12-13 | End: 2019-05-02 | Stop reason: SDUPTHER

## 2018-12-13 RX ORDER — INSULIN GLARGINE 300 [IU]/ML
INJECTION, SOLUTION SUBCUTANEOUS
Qty: 12 ML | Refills: 5 | Status: SHIPPED | OUTPATIENT
Start: 2018-12-13 | End: 2019-01-29 | Stop reason: SDUPTHER

## 2018-12-13 NOTE — PROGRESS NOTES
"Last 5 Patient Entered Readings                                      Current 30 Day Average: 131/83     Recent Readings 12/10/2018 12/9/2018 12/7/2018 12/3/2018 12/1/2018    SBP (mmHg) 123 131 131 144 138    DBP (mmHg) 88 90 90 84 84    Pulse 83 84 84 75 90          Digital Medicine: Health  Follow Up    Lifestyle Modifications:    1.Dietary Modifications (Sodium intake <2,000mg/day, food labels, dining out): Patient states she feels "guilty" about not focusing on diet.  States during the holidays she finds it hard.  Will send festively fit resource to mailbox    2.Physical Activity: Patient states that she also feels "guilty" for not exercising.    3.Medication Therapy: Patient has been compliant with the medication regimen.    4.Patient has the following medication side effects/concerns: Patient expressed concern about being on 4 different BP medications and states she feels tired all the time.   (Frequency/Alleviating factors/Precipitating factors, etc.)     Follow up with Mrs. Cindy Billingsley completed. No further questions or concerns. Will continue to follow up to achieve health goals.    "

## 2018-12-13 NOTE — PROGRESS NOTES
Chief Complaint   Patient presents with    Diabetes    Hypertension    rt leg rash       Cindy Billingsley is a 57 y.o. female who presents per the Chief Complaint.  Pt is known to me and was last seen by me on 3/13/2018.  All known chronic medical issues have been documented.             Diabetes   She presents for her follow-up diabetic visit. She has type 2 diabetes mellitus. Her disease course has been worsening. Pertinent negatives for hypoglycemia include no confusion, dizziness, headaches, mood changes, nervousness/anxiousness, seizures, sleepiness, speech difficulty, sweats or tremors. Associated symptoms include blurred vision, foot paresthesias and visual change. Pertinent negatives for diabetes include no chest pain, no fatigue, no foot ulcerations, no polydipsia, no polyphagia, no polyuria and no weakness. There are no hypoglycemic complications. Diabetic complications include peripheral neuropathy and retinopathy. Pertinent negatives for diabetic complications include no autonomic neuropathy, CVA, heart disease, impotence, nephropathy or PVD. Risk factors for coronary artery disease include diabetes mellitus, hypertension and stress. Current diabetic treatment includes insulin injections and oral agent (monotherapy). She is compliant with treatment most of the time. Her weight is stable. She is following a generally healthy diet. She has not had a previous visit with a dietitian. She participates in exercise intermittently. An ACE inhibitor/angiotensin II receptor blocker is being taken. She does not see a podiatrist.Eye exam is current.   Hypertension   This is a chronic problem. The current episode started more than 1 year ago. The problem has been waxing and waning since onset. The problem is uncontrolled. Associated symptoms include anxiety and blurred vision. Pertinent negatives include no chest pain, headaches, neck pain, palpitations, peripheral edema, shortness of breath or sweats. There are no  associated agents to hypertension. Risk factors for coronary artery disease include diabetes mellitus, dyslipidemia and post-menopausal state. Past treatments include angiotensin blockers and diuretics. The current treatment provides moderate improvement. Compliance problems include psychosocial issues.  Hypertensive end-organ damage includes retinopathy. There is no history of angina, kidney disease, CAD/MI, CVA, heart failure, left ventricular hypertrophy or PVD. There is no history of chronic renal disease, coarctation of the aorta, hyperaldosteronism, hypercortisolism, hyperparathyroidism, a hypertension causing med, pheochromocytoma, renovascular disease, sleep apnea or a thyroid problem.        ROS  Review of Systems   Constitutional: Negative.  Negative for activity change, appetite change, chills, diaphoresis, fatigue, fever and unexpected weight change.   HENT: Negative.  Negative for congestion, ear pain, hearing loss, nosebleeds, postnasal drip, rhinorrhea, sinus pressure, sneezing, sore throat and trouble swallowing.    Eyes: Positive for blurred vision. Negative for pain and visual disturbance.   Respiratory: Negative for cough, choking and shortness of breath.    Cardiovascular: Negative for chest pain, palpitations and leg swelling.   Gastrointestinal: Negative for abdominal pain, constipation, diarrhea, nausea and vomiting.   Endocrine: Negative for polydipsia, polyphagia and polyuria.   Genitourinary: Negative for difficulty urinating, dysuria, frequency, impotence and urgency.   Musculoskeletal: Negative.  Negative for arthralgias, back pain, gait problem, joint swelling, myalgias and neck pain.   Skin: Positive for rash (right thigh).   Allergic/Immunologic: Negative for environmental allergies and food allergies.   Neurological: Negative.  Negative for dizziness, tremors, seizures, syncope, speech difficulty, weakness, light-headedness and headaches.   Psychiatric/Behavioral: Negative.  Negative  "for confusion, decreased concentration, dysphoric mood and sleep disturbance. The patient is not nervous/anxious.        Physical Exam  Vitals:    12/13/18 1527   BP: 114/80   Pulse: 78   Resp: 17   Temp: 98 °F (36.7 °C)    Body mass index is 29.57 kg/m².  Weight: 83.1 kg (183 lb 3.2 oz)   Height: 5' 6" (167.6 cm)     Physical Exam   Constitutional: She is oriented to person, place, and time. She appears well-developed and well-nourished. She is active and cooperative.  Non-toxic appearance. She does not have a sickly appearance. She does not appear ill. No distress.   HENT:   Head: Normocephalic and atraumatic.   Right Ear: Hearing and external ear normal. No decreased hearing is noted.   Left Ear: Hearing and external ear normal. No decreased hearing is noted.   Nose: Nose normal. No rhinorrhea or nasal deformity.   Mouth/Throat: Uvula is midline and oropharynx is clear and moist. She does not have dentures. Normal dentition.   Eyes: Conjunctivae, EOM and lids are normal. Pupils are equal, round, and reactive to light. Right eye exhibits no chemosis, no discharge and no exudate. No foreign body present in the right eye. Left eye exhibits no chemosis, no discharge and no exudate. No foreign body present in the left eye. No scleral icterus.   Neck: Normal range of motion and full passive range of motion without pain. Neck supple.   Cardiovascular: Normal rate, regular rhythm, S1 normal, S2 normal and normal heart sounds. Exam reveals no gallop and no friction rub.   No murmur heard.  Pulmonary/Chest: Effort normal and breath sounds normal. No accessory muscle usage. No respiratory distress. She has no decreased breath sounds. She has no wheezes. She has no rhonchi. She has no rales.   Abdominal: Soft. Normal appearance. She exhibits no distension. There is no hepatosplenomegaly. There is no tenderness. There is no rigidity, no rebound and no guarding.   Musculoskeletal: Normal range of motion.   Neurological: She is " alert and oriented to person, place, and time. She has normal strength. No cranial nerve deficit or sensory deficit. She exhibits normal muscle tone. She displays no seizure activity. Coordination and gait normal.   Skin: Skin is warm, dry and intact. No rash noted. She is not diaphoretic.        Psychiatric: She has a normal mood and affect. Her speech is normal and behavior is normal. Judgment and thought content normal. Cognition and memory are normal. She is attentive.       Assessment & Plan    1. Type 2 diabetes mellitus with diabetic polyneuropathy, with long-term current use of insulin  Patient is encouraged to follow a diet low in carbohydrates and simple sugars.  Discussed simple vs. complex carbohydrates as well as eating times of certain meals. Advised to focus on good food choices and increased physical activity and encouraged to adhere to medication regimen and/or lifestyle adjustments, and to check glucose level as recommended.  Contact office if glucose levels are not improving over time.  Managed by Endocrinology; patient referred for Digital Medicine.     - Diabetes Digital Medicine (DDMP) Enrollment Order  - insulin glargine, TOUJEO, (TOUJEO SOLOSTAR U-300 INSULIN) 300 unit/mL (1.5 mL) InPn pen; INJECT 48 UNITS SUBCUTANEOUSLY ONCE DAILY  Dispense: 12 mL; Refill: 5    2. Essential hypertension  Patient was counseled and encouraged to maintain a low sodium diet, as well as increasing physical activity.  Recommend random BP checks at home on a regular basis.  Repeat BP at end of visit was not necessary. Will continue medication at this time, and follow up in 3-6 months, or sooner if blood pressure begins to increase.       3. Dermatitis  The current medical regimen is effective at this time and no changes to present plan or medications will be made at this visit.     - triamcinolone (KENALOG) 0.5 % ointment; Apply topically 2 (two) times daily.  Dispense: 30 g; Refill: 1    4. Depression with  anxiety  The current medical regimen is effective at this time and no changes to present plan or medications will be made at this visit.  Medication prescribed to treat current symptoms; advised to use medication only as symptoms require.   - clonazePAM (KLONOPIN) 0.5 MG tablet; Take 1 tablet (0.5 mg total) by mouth 2 (two) times daily as needed for Anxiety.  Dispense: 60 tablet; Refill: 2        Follow up documented    ACTIVE MEDICAL ISSUES:  Documented in Problem List    PAST MEDICAL HISTORY  Documented    PAST SURGICAL HISTORY:  Documented    SOCIAL HISTORY:  Documented    FAMILY HISTORY:  Documented    ALLERGIES AND MEDICATIONS: updated and reviewed.  Documented    Health Maintenance       Date Due Completion Date    Pap Smear with HPV Cotest 02/28/2019 2/29/2016    Lipid Panel 03/19/2019 3/19/2018    Override on 1/12/2015: Done (future)    Hemoglobin A1c 04/19/2019 10/19/2018    Override on 6/26/2015: Done (will do future ,  appoint 06/29/15)    Foot Exam 06/26/2019 6/26/2018    Override on 6/26/2018: Done    Override on 6/12/2017: Done    Override on 6/26/2015: Done (today)    Eye Exam 08/12/2019 8/12/2018 (Done)    Override on 8/12/2018: Done    Override on 6/19/2017: Done (Dr Enrique)    Override on 12/16/2016: Done (Dr. Royal)    Override on 4/1/2016: Done (Dr Brown)    Override on 6/26/2015: Declined ( she will go until 08/2015)    Override on 6/24/2014: Declined (had one done)    Low Dose Statin 10/12/2019 10/12/2018    Mammogram 05/20/2020 5/20/2018    TETANUS VACCINE 08/01/2020 8/1/2010    Colonoscopy 11/16/2027 11/16/2017

## 2018-12-27 DIAGNOSIS — F41.8 DEPRESSION WITH ANXIETY: ICD-10-CM

## 2018-12-27 DIAGNOSIS — F51.02 INSOMNIA DUE TO STRESS: ICD-10-CM

## 2018-12-27 RX ORDER — ZOLPIDEM TARTRATE 5 MG/1
TABLET ORAL
Qty: 30 TABLET | Refills: 0 | Status: SHIPPED | OUTPATIENT
Start: 2018-12-27 | End: 2019-02-11 | Stop reason: SDUPTHER

## 2018-12-27 RX ORDER — ESCITALOPRAM OXALATE 10 MG/1
TABLET ORAL
Qty: 90 TABLET | Refills: 1 | Status: SHIPPED | OUTPATIENT
Start: 2018-12-27 | End: 2019-06-21

## 2019-01-07 NOTE — PROGRESS NOTES
Last 5 Patient Entered Readings                                      Current 30 Day Average: 131/84     Recent Readings 1/3/2019 1/2/2019 12/26/2018 12/21/2018 12/17/2018    SBP (mmHg) 132 147 130 138 129    DBP (mmHg) 81 80 80 83 86    Pulse 82 87 87 82 69        Left voicemail and sent [email or web address] message. Health  addressed diet during the holidays and has upcoming outreach scheduled.

## 2019-01-10 NOTE — PROGRESS NOTES
Last 5 Patient Entered Readings                                      Current 30 Day Average: 132/82     Recent Readings 1/3/2019 1/2/2019 12/26/2018 12/21/2018 12/17/2018    SBP (mmHg) 132 147 130 138 129    DBP (mmHg) 81 80 80 83 86    Pulse 82 87 87 82 69        HPI:  Called patient to follow up. Patient endorses adherence to medication regimen but admits she is not completely following low sodium diet and not exercise. Reports fatigue. Frustrated that her BP is not controlled despite being on multiple medications.    Patient denies hypotensive s/sx (lightheadedness, dizziness, nausea, fatigue); patient denies hypertensive s/sx (SOB, CP, severe headaches, changes in vision, dizziness, fatigue, confusion, anxiety, nosebleeds).     Assessment:  Reviewed recent readings. Per 2017 ACC/ AHA HTN guidelines (goal of BP < 130/80), current 30-day average is slightly above goal.    Plan:  Discussed ways to help patient feel more motivated and accountable with exercise. She intends to begin exercising on her way home from work (she works night shift). She will take her BP medications when she returns home from gym. Discussed setting a reminder or alarm in her phone. She is also interested in weekly check ins from care team ([facility] or calls); will discuss with health . Discussed that fatigue may improve with increased exercise and improved BP and BG control.    Continue current medication regimen. I will continue to monitor regularly and will follow-up in 4 to 6 weeks, sooner if blood pressure begins to trend upward or downward.     Current medication regimen:  Hypertension Medications             amLODIPine (NORVASC) 5 MG tablet Take 1 tablet (5 mg total) by mouth once daily.    hydroCHLOROthiazide (HYDRODIURIL) 25 MG tablet Take 1 tablet (25 mg total) by mouth once daily.    metoprolol succinate (TOPROL-XL) 100 MG 24 hr tablet Take 1 tablet (100 mg total) by mouth once daily.    valsartan (DIOVAN) 320 MG tablet Take  1 tablet (320 mg total) by mouth once daily.        Patient denies having questions or concerns. Patient has my contact information and knows to call with any concerns or clinical changes.

## 2019-01-14 ENCOUNTER — PATIENT OUTREACH (OUTPATIENT)
Dept: OTHER | Facility: OTHER | Age: 59
End: 2019-01-14

## 2019-01-14 NOTE — PROGRESS NOTES
Last 5 Patient Entered Readings                                      Current 30 Day Average: 134/81     Recent Readings 1/11/2019 1/3/2019 1/2/2019 12/26/2018 12/21/2018    SBP (mmHg) 128 132 147 130 138    DBP (mmHg) 78 81 80 80 83    Pulse 90 82 87 87 82          Digital Medicine: Health  Follow Up    Lifestyle Modifications:    1.Dietary Modifications (Sodium intake <2,000mg/day, food labels, dining out): deferred    2.Physical Activity: Patient states she is going to try to go to the gym 3x/week (M,W,F).  States she is a member at [employer] and likes to walk on the treadmill and ride the bike.  Encouraged patient to make this a part of her routine after work.  Encouraged patient to start out with at least 30min to develop going to the gym as a habit.  Asked pharmD and I to contact her every week to give her some motivation.  Offered an article about motivation and patient agreed.  Will print and send to mailbox.    3.Medication Therapy: Patient has been compliant with the medication regimen.     4.Patient has the following medication side effects/concerns: none  (Frequency/Alleviating factors/Precipitating factors, etc.)     Follow up with Mrs. Cindy Billingsley completed. No further questions or concerns. Will continue to follow up to achieve health goals.    States one of her goals is to cut back on medication.  States she takes 8 medications every day.

## 2019-01-21 ENCOUNTER — PATIENT OUTREACH (OUTPATIENT)
Dept: OTHER | Facility: OTHER | Age: 59
End: 2019-01-21

## 2019-01-21 NOTE — PROGRESS NOTES
Last 5 Patient Entered Readings                                      Current 30 Day Average: 132/80     Recent Readings 1/19/2019 1/14/2019 1/11/2019 1/3/2019 1/2/2019    SBP (mmHg) 134 119 128 132 147    DBP (mmHg) 80 78 78 81 80    Pulse 81 87 90 82 87          1/21: LVM. Will call in 1 week.    1/24: Sending [employer] message since patient requested reminders every week to continue to exercise.

## 2019-01-25 DIAGNOSIS — F51.02 INSOMNIA DUE TO STRESS: ICD-10-CM

## 2019-01-25 RX ORDER — ATORVASTATIN CALCIUM 40 MG/1
TABLET, FILM COATED ORAL
Qty: 90 TABLET | Refills: 3 | Status: SHIPPED | OUTPATIENT
Start: 2019-01-25 | End: 2019-07-01 | Stop reason: SDUPTHER

## 2019-01-28 NOTE — PROGRESS NOTES
Last 5 Patient Entered Readings                                      Current 30 Day Average: 130/79     Recent Readings 1/22/2019 1/19/2019 1/14/2019 1/11/2019 1/3/2019    SBP (mmHg) 117 134 119 128 132    DBP (mmHg) 76 80 78 78 81    Pulse 84 81 87 90 82          1/28: LVM.  Will call in 1 week.  Patient requested weekly reminders about exercise.

## 2019-01-29 ENCOUNTER — OFFICE VISIT (OUTPATIENT)
Dept: OBSTETRICS AND GYNECOLOGY | Facility: CLINIC | Age: 59
End: 2019-01-29
Payer: COMMERCIAL

## 2019-01-29 VITALS
BODY MASS INDEX: 29.97 KG/M2 | SYSTOLIC BLOOD PRESSURE: 128 MMHG | HEIGHT: 66 IN | DIASTOLIC BLOOD PRESSURE: 80 MMHG | WEIGHT: 186.5 LBS

## 2019-01-29 DIAGNOSIS — Z79.4 TYPE 2 DIABETES MELLITUS WITH DIABETIC POLYNEUROPATHY, WITH LONG-TERM CURRENT USE OF INSULIN: ICD-10-CM

## 2019-01-29 DIAGNOSIS — Z01.419 WOMEN'S ANNUAL ROUTINE GYNECOLOGICAL EXAMINATION: Primary | ICD-10-CM

## 2019-01-29 DIAGNOSIS — I10 ESSENTIAL HYPERTENSION: ICD-10-CM

## 2019-01-29 DIAGNOSIS — Z11.51 SCREENING FOR HPV (HUMAN PAPILLOMAVIRUS): ICD-10-CM

## 2019-01-29 DIAGNOSIS — F51.02 INSOMNIA DUE TO STRESS: ICD-10-CM

## 2019-01-29 DIAGNOSIS — Z12.4 ENCOUNTER FOR PAPANICOLAOU SMEAR FOR CERVICAL CANCER SCREENING: ICD-10-CM

## 2019-01-29 DIAGNOSIS — L30.9 DERMATITIS: ICD-10-CM

## 2019-01-29 DIAGNOSIS — E11.42 TYPE 2 DIABETES MELLITUS WITH DIABETIC POLYNEUROPATHY, WITH LONG-TERM CURRENT USE OF INSULIN: ICD-10-CM

## 2019-01-29 DIAGNOSIS — Z12.31 ENCOUNTER FOR SCREENING MAMMOGRAM FOR BREAST CANCER: ICD-10-CM

## 2019-01-29 DIAGNOSIS — F41.8 DEPRESSION WITH ANXIETY: ICD-10-CM

## 2019-01-29 DIAGNOSIS — N95.1 HOT FLASHES, MENOPAUSAL: ICD-10-CM

## 2019-01-29 PROCEDURE — 87624 HPV HI-RISK TYP POOLED RSLT: CPT

## 2019-01-29 PROCEDURE — 99999 PR PBB SHADOW E&M-EST. PATIENT-LVL V: CPT | Mod: PBBFAC,,, | Performed by: NURSE PRACTITIONER

## 2019-01-29 PROCEDURE — 3074F PR MOST RECENT SYSTOLIC BLOOD PRESSURE < 130 MM HG: ICD-10-PCS | Mod: CPTII,S$GLB,, | Performed by: NURSE PRACTITIONER

## 2019-01-29 PROCEDURE — 99999 PR PBB SHADOW E&M-EST. PATIENT-LVL V: ICD-10-PCS | Mod: PBBFAC,,, | Performed by: NURSE PRACTITIONER

## 2019-01-29 PROCEDURE — 3079F DIAST BP 80-89 MM HG: CPT | Mod: CPTII,S$GLB,, | Performed by: NURSE PRACTITIONER

## 2019-01-29 PROCEDURE — 99396 PREV VISIT EST AGE 40-64: CPT | Mod: S$GLB,,, | Performed by: NURSE PRACTITIONER

## 2019-01-29 PROCEDURE — 3074F SYST BP LT 130 MM HG: CPT | Mod: CPTII,S$GLB,, | Performed by: NURSE PRACTITIONER

## 2019-01-29 PROCEDURE — 3079F PR MOST RECENT DIASTOLIC BLOOD PRESSURE 80-89 MM HG: ICD-10-PCS | Mod: CPTII,S$GLB,, | Performed by: NURSE PRACTITIONER

## 2019-01-29 PROCEDURE — 99396 PR PREVENTIVE VISIT,EST,40-64: ICD-10-PCS | Mod: S$GLB,,, | Performed by: NURSE PRACTITIONER

## 2019-01-29 PROCEDURE — 88175 CYTOPATH C/V AUTO FLUID REDO: CPT

## 2019-01-29 RX ORDER — METOPROLOL SUCCINATE 100 MG/1
TABLET, EXTENDED RELEASE ORAL
Qty: 90 TABLET | Refills: 0 | Status: SHIPPED | OUTPATIENT
Start: 2019-01-29 | End: 2019-04-23 | Stop reason: SDUPTHER

## 2019-01-29 RX ORDER — INSULIN GLARGINE 300 U/ML
INJECTION, SOLUTION SUBCUTANEOUS
Qty: 12 ML | Refills: 0 | Status: SHIPPED | OUTPATIENT
Start: 2019-01-29 | End: 2019-06-21 | Stop reason: SDUPTHER

## 2019-01-29 RX ORDER — HYDROCHLOROTHIAZIDE 25 MG/1
25 TABLET ORAL DAILY
Qty: 90 TABLET | Refills: 0 | Status: SHIPPED | OUTPATIENT
Start: 2019-01-29 | End: 2019-10-09 | Stop reason: SDUPTHER

## 2019-01-29 NOTE — PROGRESS NOTES
CC: Annual  HPI: Pt is a 58 y.o.  female who presents for routine annual exam. 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  She is postmenopausal.  LMP 2016.  Denies any episodes of vaginal bleeding.  She is not currently sexually active.  She does not want STD screening.  Pt is c/o hot flashes.  Reports they are currently manageable.  The patient participates in regular exercise: yes.  The patient does not smoke.  The patient wears seatbelts.   Pt denies any domestic violence.  Mammo in UTD- 5/2018 WNL.  She had a colonoscopy last year- needs repeat in 5 years d/t benign polyps.       FH:  Breast cancer: none  Colon cancer: none  Ovarian cancer: none  Endometrial cancer: none    ROS:  GENERAL: Feeling well overall. Denies fever or chills.   SKIN: Denies rash or lesions.   HEAD: Denies head injury or headache.   NODES: Denies enlarged lymph nodes.   CHEST: Denies chest pain or shortness of breath.   CARDIOVASCULAR: Denies palpitations or left sided chest pain.   ABDOMEN: No abdominal pain, constipation, diarrhea, nausea, vomiting or rectal bleeding.   URINARY: No dysuria, hematuria, or burning on urination.  REPRODUCTIVE: See HPI.   BREASTS: Denies pain, lumps, or nipple discharge.   HEMATOLOGIC: No easy bruisability or excessive bleeding.   MUSCULOSKELETAL: Denies joint pain or swelling.   NEUROLOGIC: Denies syncope or weakness.   PSYCHIATRIC: Denies depression, anxiety or mood swings.    PE:   APPEARANCE: Well nourished, well developed, Black or  female in no acute distress.  NODES: no cervical, supraclavicular, or inguinal lymphadenopathy  BREASTS: Symmetrical, no skin changes or visible lesions. No palpable masses, nipple discharge or adenopathy bilaterally. + bilateral breast reduction scars noted   ABDOMEN: Soft. No tenderness or masses. No distention. No hernias palpated. No CVA tenderness.  VULVA: No lesions. Normal external female genitalia.  URETHRAL MEATUS: Normal size and location, no  lesions, no prolapse.  URETHRA: No masses, tenderness, or prolapse.  VAGINA: Atrophic. No lesions or lacerations noted. No abnormal discharge present. No odor present.   CERVIX: No lesions or discharge. No cervical motion tenderness.   UTERUS: Normal size, regular shape, mobile, non-tender.  ADNEXA: No tenderness. No fullness or masses palpated in the adnexal regions.   ANUS PERINEUM: Normal.      Diagnosis:  1. Women's annual routine gynecological examination    2. Encounter for Papanicolaou smear for cervical cancer screening    3. Screening for HPV (human papillomavirus)    4. Encounter for screening mammogram for breast cancer    5. Hot flashes, menopausal        Plan:   Pap/ HPV  Mammo in 5/2019  Discussed R/B/A HRT for menopausal symptoms.  Pt declines at present     Orders Placed This Encounter    HPV High Risk Genotypes, PCR    Mammo Digital Screening Bilat w/ Efrain    Liquid-based pap smear, screening       Patient was counseled today on the new ACS guidelines for cervical cytology screening as well as the current recommendations for breast cancer screening. She was counseled to follow up with her PCP for other routine health maintenance. Counseling session lasted approximately 10 minutes, and all her questions were answered.    Follow-up with me in 1 year for routine exam    JUAN Orellana

## 2019-01-29 NOTE — LETTER
January 29, 2019    Cindy Jose Cruz  [address]  [place] [address]             [facility]  [address] [place] [address]  Phone: [phone]  Fax: [phone] Dear Mrs. Billingsley:    This letter is a reminder that your Hgb A1C test is due. Please call our office to schedule an appointment.    If you've already underwent or scheduled this procedure, please disregard this notice.    Sincerely,        Jes Rouse LPN

## 2019-02-01 LAB
HPV HR 12 DNA CVX QL NAA+PROBE: NEGATIVE
HPV16 AG SPEC QL: NEGATIVE
HPV18 DNA SPEC QL NAA+PROBE: NEGATIVE

## 2019-02-04 RX ORDER — VALSARTAN 320 MG/1
TABLET ORAL
Qty: 30 TABLET | Refills: 2 | Status: SHIPPED | OUTPATIENT
Start: 2019-02-04 | End: 2019-05-01 | Stop reason: SDUPTHER

## 2019-02-04 NOTE — PROGRESS NOTES
Last 5 Patient Entered Readings                                      Current 30 Day Average: 128/80     Recent Readings 2/1/2019 1/29/2019 1/22/2019 1/19/2019 1/14/2019    SBP (mmHg) 141 128 117 134 119    DBP (mmHg) 89 80 76 80 78    Pulse 82 84 84 81 87          Digital Medicine: Health  Follow Up    Lifestyle Modifications:    1.Dietary Modifications (Sodium intake <2,000mg/day, food labels, dining out): deferred    2.Physical Activity: Patient states she feels tired, but is trying to go to the gym at least 3x/week.  Set SMG    3.Medication Therapy: Patient has been compliant with the medication regimen.    4.Patient has the following medication side effects/concerns: none  (Frequency/Alleviating factors/Precipitating factors, etc.)     Follow up with Ms. Cindy Billingsley completed. No further questions or concerns. Will continue to follow up to achieve health goals.

## 2019-02-11 ENCOUNTER — TELEPHONE (OUTPATIENT)
Dept: FAMILY MEDICINE | Facility: CLINIC | Age: 59
End: 2019-02-11

## 2019-02-11 DIAGNOSIS — L30.9 DERMATITIS: ICD-10-CM

## 2019-02-11 DIAGNOSIS — F51.02 INSOMNIA DUE TO STRESS: ICD-10-CM

## 2019-02-11 DIAGNOSIS — I10 ESSENTIAL HYPERTENSION: ICD-10-CM

## 2019-02-11 RX ORDER — TRIAMCINOLONE ACETONIDE 5 MG/G
OINTMENT TOPICAL 2 TIMES DAILY
Qty: 30 G | Refills: 1 | Status: SHIPPED | OUTPATIENT
Start: 2019-02-11 | End: 2019-08-28

## 2019-02-11 RX ORDER — ZOLPIDEM TARTRATE 5 MG/1
5 TABLET ORAL NIGHTLY
Qty: 30 TABLET | Refills: 0 | Status: SHIPPED | OUTPATIENT
Start: 2019-02-11 | End: 2019-02-26 | Stop reason: SDUPTHER

## 2019-02-12 ENCOUNTER — LAB VISIT (OUTPATIENT)
Dept: LAB | Facility: HOSPITAL | Age: 59
End: 2019-02-12
Attending: FAMILY MEDICINE
Payer: COMMERCIAL

## 2019-02-12 DIAGNOSIS — E11.42 TYPE 2 DIABETES MELLITUS WITH DIABETIC POLYNEUROPATHY, WITH LONG-TERM CURRENT USE OF INSULIN: ICD-10-CM

## 2019-02-12 DIAGNOSIS — Z79.4 TYPE 2 DIABETES MELLITUS WITH DIABETIC POLYNEUROPATHY, WITH LONG-TERM CURRENT USE OF INSULIN: ICD-10-CM

## 2019-02-12 LAB
ESTIMATED AVG GLUCOSE: 189 MG/DL
HBA1C MFR BLD HPLC: 8.2 %

## 2019-02-12 PROCEDURE — 36415 COLL VENOUS BLD VENIPUNCTURE: CPT | Mod: PO

## 2019-02-12 PROCEDURE — 83036 HEMOGLOBIN GLYCOSYLATED A1C: CPT

## 2019-02-13 NOTE — TELEPHONE ENCOUNTER
----- Message from Shelia Tucker PA-C sent at 2/12/2019  3:10 PM CST -----  Schedule f/u with PCP to discuss uncontrolled diabetes

## 2019-02-15 RX ORDER — VALSARTAN 320 MG/1
320 TABLET ORAL DAILY
Qty: 30 TABLET | Refills: 2 | Status: CANCELLED | OUTPATIENT
Start: 2019-02-15 | End: 2020-02-15

## 2019-02-15 RX ORDER — ZOLPIDEM TARTRATE 5 MG/1
5 TABLET ORAL NIGHTLY
Qty: 30 TABLET | Refills: 0 | Status: CANCELLED | OUTPATIENT
Start: 2019-02-15

## 2019-02-15 RX ORDER — METFORMIN HYDROCHLORIDE 1000 MG/1
1000 TABLET ORAL
Qty: 90 TABLET | Refills: 1 | Status: SHIPPED | OUTPATIENT
Start: 2019-02-15 | End: 2019-02-26

## 2019-02-15 RX ORDER — ESCITALOPRAM OXALATE 10 MG/1
10 TABLET ORAL DAILY
Qty: 90 TABLET | Refills: 1 | Status: CANCELLED | OUTPATIENT
Start: 2019-02-15

## 2019-02-15 RX ORDER — ZOLPIDEM TARTRATE 5 MG/1
TABLET ORAL
Qty: 30 TABLET | Refills: 0 | OUTPATIENT
Start: 2019-02-15

## 2019-02-15 RX ORDER — TRIAMCINOLONE ACETONIDE 5 MG/G
OINTMENT TOPICAL 2 TIMES DAILY
Qty: 30 G | Refills: 1 | Status: CANCELLED | OUTPATIENT
Start: 2019-02-15

## 2019-02-15 RX ORDER — INSULIN GLARGINE 300 [IU]/ML
INJECTION, SOLUTION SUBCUTANEOUS
Qty: 12 ML | Refills: 5 | Status: SHIPPED | OUTPATIENT
Start: 2019-02-15 | End: 2019-06-21 | Stop reason: SDUPTHER

## 2019-02-18 ENCOUNTER — PATIENT OUTREACH (OUTPATIENT)
Dept: OTHER | Facility: OTHER | Age: 59
End: 2019-02-18

## 2019-02-26 ENCOUNTER — OFFICE VISIT (OUTPATIENT)
Dept: FAMILY MEDICINE | Facility: CLINIC | Age: 59
End: 2019-02-26
Payer: COMMERCIAL

## 2019-02-26 ENCOUNTER — PATIENT OUTREACH (OUTPATIENT)
Dept: OTHER | Facility: OTHER | Age: 59
End: 2019-02-26

## 2019-02-26 VITALS
RESPIRATION RATE: 20 BRPM | HEART RATE: 75 BPM | WEIGHT: 185.88 LBS | TEMPERATURE: 99 F | DIASTOLIC BLOOD PRESSURE: 80 MMHG | BODY MASS INDEX: 29.87 KG/M2 | HEIGHT: 66 IN | SYSTOLIC BLOOD PRESSURE: 136 MMHG | OXYGEN SATURATION: 93 %

## 2019-02-26 DIAGNOSIS — I10 ESSENTIAL HYPERTENSION: ICD-10-CM

## 2019-02-26 DIAGNOSIS — Z79.4 TYPE 2 DIABETES MELLITUS WITH DIABETIC POLYNEUROPATHY, WITH LONG-TERM CURRENT USE OF INSULIN: Primary | ICD-10-CM

## 2019-02-26 DIAGNOSIS — E78.2 MIXED HYPERLIPIDEMIA: ICD-10-CM

## 2019-02-26 DIAGNOSIS — E11.42 TYPE 2 DIABETES MELLITUS WITH DIABETIC POLYNEUROPATHY, WITH LONG-TERM CURRENT USE OF INSULIN: Primary | ICD-10-CM

## 2019-02-26 DIAGNOSIS — F51.02 INSOMNIA DUE TO STRESS: ICD-10-CM

## 2019-02-26 PROCEDURE — 99214 OFFICE O/P EST MOD 30 MIN: CPT | Mod: S$GLB,,, | Performed by: FAMILY MEDICINE

## 2019-02-26 PROCEDURE — 99999 PR PBB SHADOW E&M-EST. PATIENT-LVL IV: CPT | Mod: PBBFAC,,, | Performed by: FAMILY MEDICINE

## 2019-02-26 PROCEDURE — 3079F DIAST BP 80-89 MM HG: CPT | Mod: CPTII,S$GLB,, | Performed by: FAMILY MEDICINE

## 2019-02-26 PROCEDURE — 3045F PR MOST RECENT HEMOGLOBIN A1C LEVEL 7.0-9.0%: ICD-10-PCS | Mod: CPTII,S$GLB,, | Performed by: FAMILY MEDICINE

## 2019-02-26 PROCEDURE — 3008F PR BODY MASS INDEX (BMI) DOCUMENTED: ICD-10-PCS | Mod: CPTII,S$GLB,, | Performed by: FAMILY MEDICINE

## 2019-02-26 PROCEDURE — 3045F PR MOST RECENT HEMOGLOBIN A1C LEVEL 7.0-9.0%: CPT | Mod: CPTII,S$GLB,, | Performed by: FAMILY MEDICINE

## 2019-02-26 PROCEDURE — 3079F PR MOST RECENT DIASTOLIC BLOOD PRESSURE 80-89 MM HG: ICD-10-PCS | Mod: CPTII,S$GLB,, | Performed by: FAMILY MEDICINE

## 2019-02-26 PROCEDURE — 99214 PR OFFICE/OUTPT VISIT, EST, LEVL IV, 30-39 MIN: ICD-10-PCS | Mod: S$GLB,,, | Performed by: FAMILY MEDICINE

## 2019-02-26 PROCEDURE — 3075F SYST BP GE 130 - 139MM HG: CPT | Mod: CPTII,S$GLB,, | Performed by: FAMILY MEDICINE

## 2019-02-26 PROCEDURE — 3008F BODY MASS INDEX DOCD: CPT | Mod: CPTII,S$GLB,, | Performed by: FAMILY MEDICINE

## 2019-02-26 PROCEDURE — 3075F PR MOST RECENT SYSTOLIC BLOOD PRESS GE 130-139MM HG: ICD-10-PCS | Mod: CPTII,S$GLB,, | Performed by: FAMILY MEDICINE

## 2019-02-26 PROCEDURE — 99999 PR PBB SHADOW E&M-EST. PATIENT-LVL IV: ICD-10-PCS | Mod: PBBFAC,,, | Performed by: FAMILY MEDICINE

## 2019-02-26 RX ORDER — METFORMIN HYDROCHLORIDE 750 MG/1
750 TABLET, EXTENDED RELEASE ORAL 2 TIMES DAILY WITH MEALS
Qty: 180 TABLET | Refills: 1 | Status: SHIPPED | OUTPATIENT
Start: 2019-02-26 | End: 2019-06-21 | Stop reason: SINTOL

## 2019-02-26 RX ORDER — AMLODIPINE BESYLATE 5 MG/1
5 TABLET ORAL DAILY
Qty: 90 TABLET | Refills: 3
Start: 2019-02-26 | End: 2019-03-06 | Stop reason: SDUPTHER

## 2019-02-26 RX ORDER — ZOLPIDEM TARTRATE 5 MG/1
5 TABLET ORAL NIGHTLY
Qty: 30 TABLET | Refills: 5 | Status: SHIPPED | OUTPATIENT
Start: 2019-02-26 | End: 2019-09-23 | Stop reason: SDUPTHER

## 2019-02-26 NOTE — PATIENT INSTRUCTIONS
Decrease escitalopram (Lexapro) to half tablet (5 mg) daily for the next few weeks, then when you have 4 half pills (2 tablets) remaining, take every other day until done then stop.

## 2019-02-26 NOTE — PROGRESS NOTES
Last 5 Patient Entered Readings                                      Current 30 Day Average: 136/81     Recent Readings 2/26/2019 2/24/2019 2/20/2019 2/11/2019 2/10/2019    SBP (mmHg) 136 144 122 142 138    DBP (mmHg) 80 86 74 85 77    Pulse 78 91 78 87 92        2/26: LVM.  Will call in 1 week.  Patient had office visit today.

## 2019-02-26 NOTE — LETTER
April 1, 2019     Cindy Billingsley  [address]  [facility] [address]       Dear Cindy,    Thank you for enrolling in [facility]. To participate, we ask that you submit information at least once weekly through your [facility] account and maintain regular contact with your Care Team. We have not received any data or heard from you in some time.     The Digital Medicine Care Team has attempted to reach you on multiple occasions to determine if you would like to continue participating in the program. While we encourage you to continue participating fully, we understand that circumstances may change.     To continue participating in the program, please contact me at [phone]. If we do not hear back, you will be un-enrolled, and your physician will be notified of your decision.    If you have submitted data and believe you are receiving this letter in error, please call the Digital Medicine Patient Support Line at [phone] for troubleshooting.      We look forward to hearing from you soon.    Sincerely,     Heriberto Khan  [facility]   [phone]

## 2019-02-26 NOTE — PROGRESS NOTES
Chief Complaint   Patient presents with    Diabetes    Hypertension       Cindy Billingsley is a 58 y.o. female who presents per the Chief Complaint.  Pt is known to me and was last seen by me on 12/13/2018.  All known chronic medical issues have been documented.       Diabetes   She presents for her follow-up diabetic visit. She has type 2 diabetes mellitus. Her disease course has been worsening. Pertinent negatives for hypoglycemia include no confusion, dizziness, headaches, mood changes, nervousness/anxiousness, seizures, sleepiness, speech difficulty, sweats or tremors. Associated symptoms include blurred vision, foot paresthesias and visual change. Pertinent negatives for diabetes include no chest pain, no fatigue, no foot ulcerations, no polydipsia, no polyphagia, no polyuria, no weakness and no weight loss. There are no hypoglycemic complications. Symptoms are worsening. Diabetic complications include peripheral neuropathy and retinopathy. Pertinent negatives for diabetic complications include no autonomic neuropathy, CVA, heart disease, impotence, nephropathy or PVD. Risk factors for coronary artery disease include diabetes mellitus, hypertension and stress. Current diabetic treatment includes insulin injections and oral agent (monotherapy). She is compliant with treatment most of the time. Her weight is stable. She is following a generally healthy diet. She has not had a previous visit with a dietitian. She participates in exercise intermittently. An ACE inhibitor/angiotensin II receptor blocker is being taken. She does not see a podiatrist.Eye exam is current.   Hypertension   This is a chronic problem. The current episode started more than 1 year ago. The problem has been waxing and waning since onset. The problem is uncontrolled. Associated symptoms include anxiety and blurred vision. Pertinent negatives include no chest pain, headaches, neck pain, palpitations, peripheral edema, shortness of breath or  sweats. There are no associated agents to hypertension. Risk factors for coronary artery disease include diabetes mellitus, dyslipidemia and post-menopausal state. Past treatments include angiotensin blockers and diuretics. The current treatment provides moderate improvement. Compliance problems include psychosocial issues.  Hypertensive end-organ damage includes retinopathy. There is no history of angina, kidney disease, CAD/MI, CVA, heart failure, left ventricular hypertrophy or PVD. There is no history of chronic renal disease, coarctation of the aorta, hyperaldosteronism, hypercortisolism, hyperparathyroidism, a hypertension causing med, pheochromocytoma, renovascular disease, sleep apnea or a thyroid problem.        ROS  Review of Systems   Constitutional: Negative.  Negative for activity change, appetite change, chills, diaphoresis, fatigue, fever, unexpected weight change and weight loss.   HENT: Negative.  Negative for congestion, ear pain, hearing loss, nosebleeds, postnasal drip, rhinorrhea, sinus pressure, sneezing, sore throat and trouble swallowing.    Eyes: Positive for blurred vision. Negative for pain and visual disturbance.   Respiratory: Negative for cough, choking and shortness of breath.    Cardiovascular: Negative for chest pain, palpitations and leg swelling.   Gastrointestinal: Negative for abdominal pain, constipation, diarrhea, nausea and vomiting.   Endocrine: Negative for polydipsia, polyphagia and polyuria.   Genitourinary: Negative for difficulty urinating, dysuria, frequency, impotence and urgency.   Musculoskeletal: Negative.  Negative for arthralgias, back pain, gait problem, joint swelling, myalgias and neck pain.   Skin: Negative.  Negative for rash (right thigh).   Allergic/Immunologic: Negative for environmental allergies and food allergies.   Neurological: Negative.  Negative for dizziness, tremors, seizures, syncope, speech difficulty, weakness, light-headedness and headaches.  "  Psychiatric/Behavioral: Negative.  Negative for confusion, decreased concentration, dysphoric mood and sleep disturbance. The patient is not nervous/anxious.        Physical Exam  Vitals:    02/26/19 0753   BP: 136/80   Pulse:    Resp:    Temp:     Body mass index is 30 kg/m².  Weight: 84.3 kg (185 lb 13.6 oz)   Height: 5' 6" (167.6 cm)     Physical Exam   Constitutional: She is oriented to person, place, and time. She appears well-developed and well-nourished. She is active and cooperative.  Non-toxic appearance. She does not have a sickly appearance. She does not appear ill. No distress.   HENT:   Head: Normocephalic and atraumatic.   Right Ear: Hearing and external ear normal. No decreased hearing is noted.   Left Ear: Hearing and external ear normal. No decreased hearing is noted.   Nose: Nose normal. No rhinorrhea or nasal deformity.   Mouth/Throat: Uvula is midline and oropharynx is clear and moist. She does not have dentures. Normal dentition.   Eyes: Conjunctivae, EOM and lids are normal. Pupils are equal, round, and reactive to light. Right eye exhibits no chemosis, no discharge and no exudate. No foreign body present in the right eye. Left eye exhibits no chemosis, no discharge and no exudate. No foreign body present in the left eye. No scleral icterus.   Neck: Normal range of motion and full passive range of motion without pain. Neck supple.   Cardiovascular: Normal rate, regular rhythm, S1 normal, S2 normal and normal heart sounds. Exam reveals no gallop and no friction rub.   No murmur heard.  Pulmonary/Chest: Effort normal and breath sounds normal. No accessory muscle usage. No respiratory distress. She has no decreased breath sounds. She has no wheezes. She has no rhonchi. She has no rales.   Abdominal: Soft. Normal appearance. She exhibits no distension. There is no hepatosplenomegaly. There is no tenderness. There is no rigidity, no rebound and no guarding.   Musculoskeletal: Normal range of " motion.   Neurological: She is alert and oriented to person, place, and time. She has normal strength. No cranial nerve deficit or sensory deficit. She exhibits normal muscle tone. She displays no seizure activity. Coordination and gait normal.   Skin: Skin is warm, dry and intact. No rash noted. She is not diaphoretic.   Psychiatric: She has a normal mood and affect. Her speech is normal and behavior is normal. Judgment and thought content normal. Cognition and memory are normal. She is attentive.       Assessment & Plan    1. Type 2 diabetes mellitus with diabetic polyneuropathy, with long-term current use of insulin  Patient is encouraged to follow a diet low in carbohydrates and simple sugars.  Discussed simple vs. complex carbohydrates as well as eating times of certain meals. Advised to focus on good food choices and increased physical activity and encouraged to adhere to medication regimen and/or lifestyle adjustments, and to check glucose level as recommended.  Contact office if glucose levels are not improving over time.  Screening blood test is due in 3 months.     - metFORMIN (GLUCOPHAGE-XR) 750 MG 24 hr tablet; Take 1 tablet (750 mg total) by mouth 2 (two) times daily with meals.  Dispense: 180 tablet; Refill: 1    2. Mixed hyperlipidemia  We discussed ways to manage cholesterol levels, including increasing whole grain foods and decreasing fried and fatty foods.  I also recommended OTC Omega 3 and Omega 6 supplements to improve overall cholesterol levels.  Will continue current therapy at this visit; patient is not due for screening blood test at this time.     3. Essential hypertension  Patient was counseled and encouraged to maintain a low sodium diet, as well as increasing physical activity.  Recommend random BP checks at home on a regular basis.  Repeat BP at end of visit was not necessary. Will continue medication at this time, and follow up in 3-6 months, or sooner if blood pressure begins to  increase.       4. Insomnia due to stress  The current medical regimen is effective at this time and no changes to present plan or medications will be made at this visit.     - zolpidem (AMBIEN) 5 MG Tab; Take 1 tablet (5 mg total) by mouth every evening.  Dispense: 30 tablet; Refill: 5      Follow up documented    ACTIVE MEDICAL ISSUES:  Documented in Problem List    PAST MEDICAL HISTORY  Documented    PAST SURGICAL HISTORY:  Documented    SOCIAL HISTORY:  Documented    FAMILY HISTORY:  Documented    ALLERGIES AND MEDICATIONS: updated and reviewed.  Documented    Health Maintenance       Date Due Completion Date    Lipid Panel 03/19/2019 3/19/2018    Override on 1/12/2015: Done (future)    Foot Exam 06/26/2019 6/26/2018    Override on 6/26/2018: Done    Override on 6/12/2017: Done    Override on 6/26/2015: Done (today)    Hemoglobin A1c 08/12/2019 2/12/2019    Override on 6/26/2015: Done (will do future ,  appoint 06/29/15)    Eye Exam 08/12/2019 8/12/2018 (Done)    Override on 8/12/2018: Done    Override on 6/19/2017: Done (Dr Enrique)    Override on 12/16/2016: Done (Dr. Royal)    Override on 4/1/2016: Done (Dr Brown)    Override on 6/26/2015: Declined ( she will go until 08/2015)    Override on 6/24/2014: Declined (had one done)    Low Dose Statin 01/25/2020 1/25/2019    Mammogram 05/20/2020 5/20/2018    TETANUS VACCINE 08/01/2020 8/1/2010    Pap Smear with HPV Cotest 01/29/2022 1/29/2019    Colonoscopy 11/16/2027 11/16/2017

## 2019-03-06 RX ORDER — AMLODIPINE BESYLATE 5 MG/1
5 TABLET ORAL DAILY
Qty: 90 TABLET | Refills: 3
Start: 2019-03-06 | End: 2019-03-08 | Stop reason: SDUPTHER

## 2019-03-08 NOTE — PROGRESS NOTES
Last 5 Patient Entered Readings                                      Current 30 Day Average: 138/82     Recent Readings 3/8/2019 2/28/2019 2/26/2019 2/24/2019 2/20/2019    SBP (mmHg) 165 128 136 144 122    DBP (mmHg) 92 86 80 86 74    Pulse 89 84 78 91 78          3/8: LVM.  Will call in 1 week.

## 2019-03-09 RX ORDER — AMLODIPINE BESYLATE 5 MG/1
5 TABLET ORAL DAILY
Qty: 90 TABLET | Refills: 3
Start: 2019-03-09 | End: 2019-04-08 | Stop reason: SDUPTHER

## 2019-03-09 RX ORDER — AMLODIPINE BESYLATE 10 MG/1
TABLET ORAL
Qty: 90 TABLET | Refills: 1 | OUTPATIENT
Start: 2019-03-09

## 2019-03-12 ENCOUNTER — OFFICE VISIT (OUTPATIENT)
Dept: CARDIOLOGY | Facility: CLINIC | Age: 59
End: 2019-03-12
Payer: COMMERCIAL

## 2019-03-12 VITALS
HEIGHT: 66 IN | BODY MASS INDEX: 30.04 KG/M2 | WEIGHT: 186.94 LBS | OXYGEN SATURATION: 95 % | RESPIRATION RATE: 15 BRPM | DIASTOLIC BLOOD PRESSURE: 84 MMHG | SYSTOLIC BLOOD PRESSURE: 142 MMHG | HEART RATE: 81 BPM

## 2019-03-12 DIAGNOSIS — I10 ESSENTIAL HYPERTENSION: Primary | ICD-10-CM

## 2019-03-12 DIAGNOSIS — G47.33 OSA (OBSTRUCTIVE SLEEP APNEA): ICD-10-CM

## 2019-03-12 DIAGNOSIS — E78.2 MIXED HYPERLIPIDEMIA: ICD-10-CM

## 2019-03-12 DIAGNOSIS — Z79.4 TYPE 2 DIABETES MELLITUS WITH DIABETIC POLYNEUROPATHY, WITH LONG-TERM CURRENT USE OF INSULIN: ICD-10-CM

## 2019-03-12 DIAGNOSIS — E11.42 TYPE 2 DIABETES MELLITUS WITH DIABETIC POLYNEUROPATHY, WITH LONG-TERM CURRENT USE OF INSULIN: ICD-10-CM

## 2019-03-12 DIAGNOSIS — E66.9 NON MORBID OBESITY, UNSPECIFIED OBESITY TYPE: ICD-10-CM

## 2019-03-12 PROCEDURE — 99214 PR OFFICE/OUTPT VISIT, EST, LEVL IV, 30-39 MIN: ICD-10-PCS | Mod: S$GLB,,, | Performed by: INTERNAL MEDICINE

## 2019-03-12 PROCEDURE — 3077F SYST BP >= 140 MM HG: CPT | Mod: CPTII,S$GLB,, | Performed by: INTERNAL MEDICINE

## 2019-03-12 PROCEDURE — 3008F BODY MASS INDEX DOCD: CPT | Mod: CPTII,S$GLB,, | Performed by: INTERNAL MEDICINE

## 2019-03-12 PROCEDURE — 3008F PR BODY MASS INDEX (BMI) DOCUMENTED: ICD-10-PCS | Mod: CPTII,S$GLB,, | Performed by: INTERNAL MEDICINE

## 2019-03-12 PROCEDURE — 99214 OFFICE O/P EST MOD 30 MIN: CPT | Mod: S$GLB,,, | Performed by: INTERNAL MEDICINE

## 2019-03-12 PROCEDURE — 3079F DIAST BP 80-89 MM HG: CPT | Mod: CPTII,S$GLB,, | Performed by: INTERNAL MEDICINE

## 2019-03-12 PROCEDURE — 3079F PR MOST RECENT DIASTOLIC BLOOD PRESSURE 80-89 MM HG: ICD-10-PCS | Mod: CPTII,S$GLB,, | Performed by: INTERNAL MEDICINE

## 2019-03-12 PROCEDURE — 3045F PR MOST RECENT HEMOGLOBIN A1C LEVEL 7.0-9.0%: ICD-10-PCS | Mod: CPTII,S$GLB,, | Performed by: INTERNAL MEDICINE

## 2019-03-12 PROCEDURE — 99999 PR PBB SHADOW E&M-EST. PATIENT-LVL III: ICD-10-PCS | Mod: PBBFAC,,, | Performed by: INTERNAL MEDICINE

## 2019-03-12 PROCEDURE — 3077F PR MOST RECENT SYSTOLIC BLOOD PRESSURE >= 140 MM HG: ICD-10-PCS | Mod: CPTII,S$GLB,, | Performed by: INTERNAL MEDICINE

## 2019-03-12 PROCEDURE — 93000 EKG 12-LEAD: ICD-10-PCS | Mod: S$GLB,,, | Performed by: INTERNAL MEDICINE

## 2019-03-12 PROCEDURE — 99999 PR PBB SHADOW E&M-EST. PATIENT-LVL III: CPT | Mod: PBBFAC,,, | Performed by: INTERNAL MEDICINE

## 2019-03-12 PROCEDURE — 3045F PR MOST RECENT HEMOGLOBIN A1C LEVEL 7.0-9.0%: CPT | Mod: CPTII,S$GLB,, | Performed by: INTERNAL MEDICINE

## 2019-03-12 PROCEDURE — 93000 ELECTROCARDIOGRAM COMPLETE: CPT | Mod: S$GLB,,, | Performed by: INTERNAL MEDICINE

## 2019-03-12 RX ORDER — AMLODIPINE BESYLATE 5 MG/1
5 TABLET ORAL DAILY
Qty: 90 TABLET | Refills: 3 | Status: SHIPPED | OUTPATIENT
Start: 2019-03-12 | End: 2019-05-21

## 2019-03-12 NOTE — PROGRESS NOTES
CARDIOVASCULAR PROGRESS NOTE    REASON FOR CONSULT:   Cindy Billingsley is a 58 y.o. female who presents for f/u of HTN.    PCP: [place]  HISTORY OF PRESENT ILLNESS:   The patient returns for follow-up.  She denies intercurrent angina or dyspnea.  She has had no palpitations, lightheadedness, dizziness, or syncope.  There has been no PND, orthopnea, or lower extremity edema.  She denies melena, hematuria, or claudicant symptoms.  She tells me she has run out of her Norvasc, but is going to be filling later on today.  Her blood pressure is only mildly uncontrolled today.  She is currently following in the digital hypertension program.    Last 5 Patient Entered Readings                                      Current 30 Day Average: 138/82      Recent Readings 3/8/2019 2/28/2019 2/26/2019 2/24/2019 2/20/2019     SBP (mmHg) 165 128 136 144 122     DBP (mmHg) 92 86 80 86 74     Pulse 89 84 78 91 78       CARDIOVASCULAR HISTORY:   none    PAST MEDICAL HISTORY:     Past Medical History:   Diagnosis Date    Anxiety     Arthritis     Depression     Diabetes mellitus type II     Diabetic peripheral neuropathy 9/10/2012    Facial spasm - right side 9/10/2012    History of irregular menstrual bleeding     Hypertension     Knee pain        PAST SURGICAL HISTORY:     Past Surgical History:   Procedure Laterality Date    breast reduction      COLONOSCOPY N/A 11/16/2017    Performed by Alan Acosta MD at [facility] ENDO    TOTAL REDUCTION MAMMOPLASTY      TUBAL LIGATION         ALLERGIES AND MEDICATION:     Review of patient's allergies indicates:   Allergen Reactions    Amlodipine      Other reaction(s): Edema    Pristiq  [desvenlafaxine]      Other reaction(s): Nausea        Medication List           Accurate as of 3/12/19 10:09 AM. If you have any questions, ask your nurse or doctor.               CHANGE how you take these medications    blood sugar diagnostic Strp  Commonly known as:  BLOOD GLUCOSE TEST  One touch  What  "changed:    · when to take this  · additional instructions        CONTINUE taking these medications    ADULT ASPIRIN EC LOW STRENGTH 81 MG EC tablet  Generic drug:  aspirin     amLODIPine 5 MG tablet  Commonly known as:  NORVASC  Take 1 tablet (5 mg total) by mouth once daily.     atorvastatin 40 MG tablet  Commonly known as:  LIPITOR  TAKE ONE TABLET BY MOUTH IN THE EVENING     blood-glucose meter kit  Use as instructed     clonazePAM 0.5 MG tablet  Commonly known as:  KLONOPIN  Take 1 tablet (0.5 mg total) by mouth 2 (two) times daily as needed for Anxiety.     escitalopram oxalate 10 MG tablet  Commonly known as:  LEXAPRO  TAKE 1 TABLET BY MOUTH ONCE DAILY     hydroCHLOROthiazide 25 MG tablet  Commonly known as:  HYDRODIURIL  Take 1 tablet (25 mg total) by mouth once daily.     * insulin lispro 100 unit/mL pen  Commonly known as:  HumaLOG KwikPen Insulin  Inject each meal plus scale 150-200 +2, 201-250 +4, 251-300 +6, 301-350 +8, >350 +10.     * insulin lispro 100 unit/mL pen  Commonly known as:  HumaLOG KwikPen Insulin  INJECT 10 UNITS SUBCUTANEOUSLY WITH MEALS PLUS  SCALE  150-200  +2,  201-250  +4,  251-300  +6,  301-350  +8,  GREATER  THAN  350  +10     insulin syringe-needle U-100 0.5 mL 30 gauge x 5/16" Syrg     lancets Misc  Commonly known as:  MICROLET LANCET  Inject 1 each into the skin 3 (three) times daily.     metFORMIN 750 MG 24 hr tablet  Commonly known as:  GLUCOPHAGE-XR  Take 1 tablet (750 mg total) by mouth 2 (two) times daily with meals.     metoprolol succinate 100 MG 24 hr tablet  Commonly known as:  TOPROL-XL  TAKE ONE TABLET BY MOUTH ONCE DAILY     ONE DAILY MULTIVITAMIN per tablet  Generic drug:  multivitamin     pen needle, diabetic 31 gauge x 5/16" Ndle  Commonly known as:  PEN NEEDLE  USE ONE  SUBCUTANEOUSLY 4 TIMES DAILY     * TOUJEO SOLOSTAR U-300 INSULIN 300 unit/mL (1.5 mL) Inpn pen  Generic drug:  insulin glargine (TOUJEO)  INJECT 48 UNITS SUBCUTANEOUSLY ONCE DAILY     * insulin " glargine (TOUJEO) 300 unit/mL (1.5 mL) Inpn pen  Commonly known as:  TOUJEO SOLOSTAR U-300 INSULIN  INJECT 48 UNITS SUBCUTANEOUSLY ONCE DAILY     triamcinolone 0.5 % ointment  Commonly known as:  KENALOG  Apply topically 2 (two) times daily.     valsartan 320 MG tablet  Commonly known as:  DIOVAN  TAKE 1 TABLET BY MOUTH ONCE DAILY     VICTOZA 3-ASIYA 0.6 mg/0.1 mL (18 mg/3 mL) Pnij  Generic drug:  liraglutide 0.6 mg/0.1 mL (18 mg/3 mL) subq PNIJ  INJECT 1.8MG  SUBCUTANEOUSLY ONCE DAILY     vitamin E 400 UNIT capsule     zolpidem 5 MG Tab  Commonly known as:  AMBIEN  Take 1 tablet (5 mg total) by mouth every evening.         * This list has 4 medication(s) that are the same as other medications prescribed for you. Read the directions carefully, and ask your doctor or other care provider to review them with you.                  SOCIAL HISTORY:     Social History     Socioeconomic History    Marital status: Single     Spouse name: Not on file    Number of children: Not on file    Years of education: Not on file    Highest education level: Not on file   Social Needs    Financial resource strain: Not on file    Food insecurity - worry: Not on file    Food insecurity - inability: Not on file    Transportation needs - medical: Not on file    Transportation needs - non-medical: Not on file   Occupational History    Occupation: RN     Employer: [employer]   Tobacco Use    Smoking status: Former Smoker     Last attempt to quit: 1990     Years since quittin.1    Smokeless tobacco: Never Used    Tobacco comment: patient quit in    Substance and Sexual Activity    Alcohol use: Yes     Alcohol/week: 0.0 oz     Comment: Occasional    Drug use: No    Sexual activity: Not Currently     Birth control/protection: None, Rhythm   Other Topics Concern    Are you pregnant or think you may be? No    Breast-feeding No   Social History Narrative    Works night shift 7p - 7a at [employer] 3 x week;  "enrolled in [employer], single, 3 adult children       FAMILY HISTORY:     Family History   Problem Relation Age of Onset    Heart disease Mother     Hypertension Mother     Diabetes Father     Breast cancer Neg Hx     Colon cancer Neg Hx     Stroke Neg Hx     Ovarian cancer Neg Hx     Melanoma Neg Hx     Cervical cancer Neg Hx     Endometrial cancer Neg Hx     Vaginal cancer Neg Hx        REVIEW OF SYSTEMS:   Review of Systems   Constitutional: Negative for chills, diaphoresis and fever.   HENT: Negative for nosebleeds.    Eyes: Negative for blurred vision, double vision and photophobia.   Respiratory: Negative for hemoptysis, shortness of breath and wheezing.    Cardiovascular: Positive for leg swelling. Negative for chest pain, palpitations, orthopnea, claudication and PND.   Gastrointestinal: Negative for abdominal pain, blood in stool, heartburn, melena, nausea and vomiting.   Genitourinary: Negative for flank pain and hematuria.   Musculoskeletal: Negative for falls, myalgias and neck pain.   Skin: Negative for rash.   Neurological: Negative for dizziness, seizures, loss of consciousness, weakness and headaches.   Endo/Heme/Allergies: Negative for polydipsia. Does not bruise/bleed easily.   Psychiatric/Behavioral: Negative for depression and memory loss. The patient is not nervous/anxious.        PHYSICAL EXAM:     Vitals:    03/12/19 1000   BP: (!) 142/84   Pulse: 81   Resp: 15    Body mass index is 30.17 kg/m².  Weight: 84.8 kg (186 lb 15.2 oz)   Height: 5' 6" (167.6 cm)     Physical Exam   Constitutional: She is oriented to person, place, and time. She appears well-developed and well-nourished. She is cooperative.  Non-toxic appearance. No distress.   HENT:   Head: Normocephalic and atraumatic.   Eyes: Conjunctivae and EOM are normal. Pupils are equal, round, and reactive to light. No scleral icterus.   Neck: Trachea normal and normal range of motion. Neck supple. Normal carotid pulses and no JVD " present. Carotid bruit is not present. No neck rigidity. No tracheal deviation and no edema present. No thyromegaly present.   Cardiovascular: Normal rate, regular rhythm, S1 normal and S2 normal. PMI is not displaced. Exam reveals no gallop and no friction rub.   No murmur heard.  Pulses:       Carotid pulses are 2+ on the right side, and 2+ on the left side.  Pulmonary/Chest: Effort normal and breath sounds normal. No stridor. No respiratory distress. She has no wheezes. She has no rales. She exhibits no tenderness.   Abdominal: Soft. She exhibits no distension. There is no hepatosplenomegaly.   obese   Musculoskeletal: Normal range of motion. She exhibits no edema or tenderness.   Feet:   Right Foot:   Skin Integrity: Negative for ulcer.   Left Foot:   Skin Integrity: Negative for ulcer.   Neurological: She is alert and oriented to person, place, and time. No cranial nerve deficit.   Skin: Skin is warm and dry. No rash noted. No erythema.   Psychiatric: She has a normal mood and affect. Her speech is normal and behavior is normal.   Vitals reviewed.      DATA:   EKG: (personally reviewed tracing)  3/12/19 SR 81, low voltage    Laboratory:  CBC:  Recent Labs   Lab 12/12/17  0950 01/15/18  1130 09/24/18  1216   WHITE BLOOD CELL COUNT 7.99 7.60 7.68   HEMOGLOBIN 12.7 13.1 13.6   HEMATOCRIT 38.2 39.1 40.4   PLATELETS 246 248 259       CHEMISTRIES:  Recent Labs   Lab 01/15/18  1130 09/24/18  1258 10/19/18  0702   GLUCOSE 210 H 161 H 225 H   SODIUM 142 138 137   POTASSIUM 3.6 4.0 4.4   BUN BLD 16 15 18   CREATININE 0.9 0.8 1.0   EGFR IF  >60 >60 >60.0   EGFR IF NON- >60 >60 >60.0   CALCIUM 9.9 9.8 9.7       CARDIAC BIOMARKERS:  Recent Labs   Lab 01/15/18  1555 01/15/18  1740 09/24/18  1258 09/24/18  1520    128  --   --    CPK MB 1.4 1.3  --   --    TROPONIN I <0.006 0.008 0.019  0.019 0.017       COAGS:  Recent Labs   Lab 12/12/17  0950 01/15/18  1130 09/24/18  1258   INR 1.1  1.0 1.0       LIPIDS/LFTS:  Recent Labs   Lab 02/03/17  0740  01/15/18  1130 01/15/18  1835 03/19/18  0723 09/24/18  1258   CHOLESTEROL 174  --   --  191 127  --    TRIGLYCERIDES 215 H  --   --  141 96  --    HDL 35 L  --   --  47 39 L  --    LDL CHOLESTEROL 96.0  --   --  115.8 68.8  --    NON-HDL CHOLESTEROL 139  --   --  144 88  --    AST 19   < > 16  --  21 20   ALT 17   < > 19  --  27 19    < > = values in this interval not displayed.     Lab Results   Component Value Date    TSH 1.458 09/24/2018         Cardiovascular Testing:  Echo 10/2/18  · Left ventricle ejection fraction is normal at 60%  · Normal LV diastolic function.  · Mitral valve shows mild regurgitation.  · Tricuspid valve shows trace regurgitation.    Holter 10/1/18  · Predominantly NSR  · 2 PVCs  · 3 PACs    L MPI 3/19/18  Nuclear Quantitative Functional Analysis:   LVEF: >= 70 %  Impression: NORMAL MYOCARDIAL PERFUSION  1. The perfusion scan is free of evidence for myocardial ischemia or injury.   2. Resting wall motion is physiologic.   3. Resting LV function is normal.   4. The ventricular volumes are normal at rest and stress.   5. The extracardiac distribution of radioactivity is normal.   6. There was no previous study available to compare.    ASSESSMENT:   # Palps, resolved.  Echo/Holter 10/2018 normal.  # HTN, uncontrolled, but pt ran out of norvasc 5mg (LE edema with 10mg dose of amlod).  # DM  # HLP, on atorva 40mg  # anxiety  # MONIQUE  # BMI 30    PLAN:   Cont med rx  Cont monitoring in digital htn program  Diet/exercise/weight loss  RTC prn    Milton Boston MD, FACC

## 2019-03-14 LAB
LEFT EYE DM RETINOPATHY: NEGATIVE
RIGHT EYE DM RETINOPATHY: NEGATIVE

## 2019-04-01 NOTE — PROGRESS NOTES
Last 5 Patient Entered Readings                                      Current 30 Day Average: 140/85     Recent Readings 4/1/2019 3/23/2019 3/18/2019 3/18/2019 3/18/2019    SBP (mmHg) 130 134 137 137 144    DBP (mmHg) 78 83 80 93 79    Pulse 83 74 79 84 90        4/1: LVM.  Non compliance.  Will call in 3 weeks.

## 2019-04-02 NOTE — PROGRESS NOTES
Last 5 Patient Entered Readings                                      Current 30 Day Average: 140/85     Recent Readings 4/1/2019 3/23/2019 3/18/2019 3/18/2019 3/18/2019    SBP (mmHg) 130 134 137 137 144    DBP (mmHg) 78 83 80 93 79    Pulse 83 74 79 84 90          Digital Medicine: Health  Follow Up    Lifestyle Modifications:    1.Dietary Modifications (Sodium intake <2,000mg/day, food labels, dining out): Reports she has been eating out more since doing renovations on her house. Reports she was thinking about meal prepping and freezing foods.  Encouraged patient to cook or find fresh prepared options in the grocery store.    2.Physical Activity: Reports it has been difficult to exercise due to working 12hr shifts 5x/week.  States she felt so much better after going to gym with her daughter on Monday.  Reports her daughter is in the process of moving, but should be moved out by next week. Encouraged patient to make schedule with daughter to go to gym.    3.Medication Therapy: Patient has been compliant with the medication regimen.    4.Patient has the following medication side effects/concerns: none  (Frequency/Alleviating factors/Precipitating factors, etc.)     Follow up with Mrs. Cindy Billingsley completed. No further questions or concerns. Will continue to follow up to achieve health goals.

## 2019-04-08 RX ORDER — AMLODIPINE BESYLATE 5 MG/1
5 TABLET ORAL DAILY
Qty: 90 TABLET | Refills: 3
Start: 2019-04-08 | End: 2019-05-21

## 2019-04-08 NOTE — TELEPHONE ENCOUNTER
----- Message from Destiny Garcia sent at 4/8/2019  9:13 AM CDT -----  Contact: self  Type: RX Refill Request    Who Called: pt    Refill or New Rx:refill    RX Name and Strength:VICTOZA 3-ASIYA 0.6 mg/0.1 mL (18 mg/3 mL) MtIj        Preferred Pharmacy with phone number:..  [id number] - [place], [place] - [address] [place] [address]  Phone: [phone] Fax: [phone]    Would the patient rather a call back or a response via [facility]? callback    Best Call Back Number: [phone]

## 2019-04-18 RX ORDER — AMLODIPINE BESYLATE 5 MG/1
5 TABLET ORAL DAILY
Qty: 90 TABLET | Refills: 3 | Status: SHIPPED | OUTPATIENT
Start: 2019-04-18 | End: 2019-08-30

## 2019-04-23 DIAGNOSIS — I10 ESSENTIAL HYPERTENSION: ICD-10-CM

## 2019-04-23 RX ORDER — METOPROLOL SUCCINATE 100 MG/1
TABLET, EXTENDED RELEASE ORAL
Qty: 90 TABLET | Refills: 3 | Status: SHIPPED | OUTPATIENT
Start: 2019-04-23 | End: 2019-06-17

## 2019-04-23 NOTE — TELEPHONE ENCOUNTER
Pls call pt to ask her to switch prescribing provider to her PCP for future refills, otherwise she will need to follow up with me in the next year for further refills.

## 2019-05-01 DIAGNOSIS — I10 ESSENTIAL HYPERTENSION: ICD-10-CM

## 2019-05-01 DIAGNOSIS — F41.8 DEPRESSION WITH ANXIETY: ICD-10-CM

## 2019-05-02 DIAGNOSIS — I10 ESSENTIAL HYPERTENSION: ICD-10-CM

## 2019-05-02 DIAGNOSIS — F41.8 DEPRESSION WITH ANXIETY: ICD-10-CM

## 2019-05-02 RX ORDER — VALSARTAN 320 MG/1
TABLET ORAL
Qty: 90 TABLET | Refills: 0 | Status: SHIPPED | OUTPATIENT
Start: 2019-05-02 | End: 2019-07-05 | Stop reason: SDUPTHER

## 2019-05-02 RX ORDER — VALSARTAN 320 MG/1
320 TABLET ORAL DAILY
Qty: 90 TABLET | Refills: 0 | Status: CANCELLED | OUTPATIENT
Start: 2019-05-02

## 2019-05-02 NOTE — TELEPHONE ENCOUNTER
Lab Results   Component Value Date    CREATININE 1.0 10/19/2018       Lab Results   Component Value Date    WBC 7.68 09/24/2018    HGB 13.6 09/24/2018    HCT 40.4 09/24/2018    MCV 82 09/24/2018     09/24/2018       Lab Results   Component Value Date    CHOL 127 03/19/2018    CHOL 191 01/15/2018    CHOL 174 02/03/2017     Lab Results   Component Value Date    HDL 39 (L) 03/19/2018    HDL 47 01/15/2018    HDL 35 (L) 02/03/2017     Lab Results   Component Value Date    LDLCALC 68.8 03/19/2018    LDLCALC 115.8 01/15/2018    LDLCALC 96.0 02/03/2017     Lab Results   Component Value Date    TRIG 96 03/19/2018    TRIG 141 01/15/2018    TRIG 215 (H) 02/03/2017     Lab Results   Component Value Date    CHOLHDL 30.7 03/19/2018    CHOLHDL 24.6 01/15/2018    CHOLHDL 20.1 02/03/2017

## 2019-05-03 RX ORDER — CLONAZEPAM 0.5 MG/1
TABLET ORAL
Qty: 60 TABLET | Refills: 2 | OUTPATIENT
Start: 2019-05-03

## 2019-05-03 RX ORDER — CLONAZEPAM 0.5 MG/1
0.5 TABLET ORAL 2 TIMES DAILY PRN
Qty: 60 TABLET | Refills: 2 | Status: SHIPPED | OUTPATIENT
Start: 2019-05-03 | End: 2019-07-26 | Stop reason: SDUPTHER

## 2019-05-07 ENCOUNTER — PATIENT OUTREACH (OUTPATIENT)
Dept: OTHER | Facility: OTHER | Age: 59
End: 2019-05-07

## 2019-05-07 NOTE — PROGRESS NOTES
Last 5 Patient Entered Readings                                      Current 30 Day Average: 128/81     Recent Readings 5/4/2019 4/25/2019 4/20/2019 4/19/2019 4/17/2019    SBP (mmHg) 127 130 127 135 114    DBP (mmHg) 78 84 79 86 82    Pulse 81 83 86 79 78          Digital Medicine: Health  Follow Up    Lifestyle Modifications:    1.Dietary Modifications (Sodium intake <2,000mg/day, food labels, dining out): Reports she is eating more salads.    2.Physical Activity: States she started going to the gym, but has renovations going on at her house.  States she keeps her gym shoes in her car so she can go straight to the gym after work.  Reports once the renovations are done, she will get back to going 3x/week and walking on the treadmill for 30min    3.Medication Therapy: Patient has been compliant with the medication regimen.    4.Patient has the following medication side effects/concerns: none  (Frequency/Alleviating factors/Precipitating factors, etc.)     Follow up with Mrs. Cindy Billingsley completed. No further questions or concerns. Will continue to follow up to achieve health goals.

## 2019-05-07 NOTE — PROGRESS NOTES
Last 5 Patient Entered Readings                                      Current 30 Day Average: 128/81     Recent Readings 5/4/2019 4/25/2019 4/20/2019 4/19/2019 4/17/2019    SBP (mmHg) 127 130 127 135 114    DBP (mmHg) 78 84 79 86 82    Pulse 81 83 86 79 78          5/7: LVM.  Will call in 3 weeks.

## 2019-05-08 ENCOUNTER — TELEPHONE (OUTPATIENT)
Dept: ADMINISTRATIVE | Facility: HOSPITAL | Age: 59
End: 2019-05-08

## 2019-05-08 DIAGNOSIS — E11.9 DIABETES MELLITUS WITHOUT COMPLICATION: Primary | ICD-10-CM

## 2019-05-15 ENCOUNTER — PATIENT OUTREACH (OUTPATIENT)
Dept: OTHER | Facility: OTHER | Age: 59
End: 2019-05-15

## 2019-05-15 DIAGNOSIS — I10 ESSENTIAL HYPERTENSION: ICD-10-CM

## 2019-05-15 NOTE — PROGRESS NOTES
Last 5 Patient Entered Readings                                      Current 30 Day Average: 129/82     Recent Readings 5/10/2019 5/4/2019 4/25/2019 4/20/2019 4/19/2019    SBP (mmHg) 132 127 130 127 135    DBP (mmHg) 78 78 84 79 86    Pulse 74 81 83 86 79          Left voicemail.  BP near goal and patient is actively working on lifestyle changes with health .  No medication recommendations at this time.

## 2019-05-21 ENCOUNTER — OFFICE VISIT (OUTPATIENT)
Dept: FAMILY MEDICINE | Facility: CLINIC | Age: 59
End: 2019-05-21
Payer: COMMERCIAL

## 2019-05-21 ENCOUNTER — LAB VISIT (OUTPATIENT)
Dept: LAB | Facility: HOSPITAL | Age: 59
End: 2019-05-21
Attending: FAMILY MEDICINE
Payer: COMMERCIAL

## 2019-05-21 VITALS
BODY MASS INDEX: 30.04 KG/M2 | DIASTOLIC BLOOD PRESSURE: 74 MMHG | HEART RATE: 81 BPM | OXYGEN SATURATION: 96 % | HEIGHT: 66 IN | TEMPERATURE: 99 F | WEIGHT: 186.94 LBS | SYSTOLIC BLOOD PRESSURE: 156 MMHG | RESPIRATION RATE: 16 BRPM

## 2019-05-21 DIAGNOSIS — E11.42 TYPE 2 DIABETES MELLITUS WITH DIABETIC POLYNEUROPATHY, WITH LONG-TERM CURRENT USE OF INSULIN: ICD-10-CM

## 2019-05-21 DIAGNOSIS — I10 ESSENTIAL HYPERTENSION: Primary | ICD-10-CM

## 2019-05-21 DIAGNOSIS — I10 ESSENTIAL HYPERTENSION: ICD-10-CM

## 2019-05-21 DIAGNOSIS — G47.33 OSA (OBSTRUCTIVE SLEEP APNEA): ICD-10-CM

## 2019-05-21 DIAGNOSIS — E11.9 DIABETES MELLITUS WITHOUT COMPLICATION: ICD-10-CM

## 2019-05-21 DIAGNOSIS — Z79.4 TYPE 2 DIABETES MELLITUS WITH DIABETIC POLYNEUROPATHY, WITH LONG-TERM CURRENT USE OF INSULIN: ICD-10-CM

## 2019-05-21 DIAGNOSIS — M17.0 PRIMARY OSTEOARTHRITIS OF BOTH KNEES: ICD-10-CM

## 2019-05-21 LAB
ANION GAP SERPL CALC-SCNC: 7 MMOL/L (ref 8–16)
BUN SERPL-MCNC: 14 MG/DL (ref 6–20)
CALCIUM SERPL-MCNC: 10.1 MG/DL (ref 8.7–10.5)
CHLORIDE SERPL-SCNC: 106 MMOL/L (ref 95–110)
CHOLEST SERPL-MCNC: 143 MG/DL (ref 120–199)
CHOLEST/HDLC SERPL: 3.8 {RATIO} (ref 2–5)
CO2 SERPL-SCNC: 27 MMOL/L (ref 23–29)
CREAT SERPL-MCNC: 0.8 MG/DL (ref 0.5–1.4)
EST. GFR  (AFRICAN AMERICAN): >60 ML/MIN/1.73 M^2
EST. GFR  (NON AFRICAN AMERICAN): >60 ML/MIN/1.73 M^2
ESTIMATED AVG GLUCOSE: 192 MG/DL (ref 68–131)
GLUCOSE SERPL-MCNC: 197 MG/DL (ref 70–110)
HBA1C MFR BLD HPLC: 8.3 % (ref 4–5.6)
HDLC SERPL-MCNC: 38 MG/DL (ref 40–75)
HDLC SERPL: 26.6 % (ref 20–50)
LDLC SERPL CALC-MCNC: 70.6 MG/DL (ref 63–159)
NONHDLC SERPL-MCNC: 105 MG/DL
POTASSIUM SERPL-SCNC: 4 MMOL/L (ref 3.5–5.1)
SODIUM SERPL-SCNC: 140 MMOL/L (ref 136–145)
TRIGL SERPL-MCNC: 172 MG/DL (ref 30–150)

## 2019-05-21 PROCEDURE — 99999 PR PBB SHADOW E&M-EST. PATIENT-LVL IV: ICD-10-PCS | Mod: PBBFAC,,, | Performed by: FAMILY MEDICINE

## 2019-05-21 PROCEDURE — 80061 LIPID PANEL: CPT

## 2019-05-21 PROCEDURE — 3078F DIAST BP <80 MM HG: CPT | Mod: CPTII,S$GLB,, | Performed by: FAMILY MEDICINE

## 2019-05-21 PROCEDURE — 99214 OFFICE O/P EST MOD 30 MIN: CPT | Mod: S$GLB,,, | Performed by: FAMILY MEDICINE

## 2019-05-21 PROCEDURE — 3045F PR MOST RECENT HEMOGLOBIN A1C LEVEL 7.0-9.0%: ICD-10-PCS | Mod: CPTII,S$GLB,, | Performed by: FAMILY MEDICINE

## 2019-05-21 PROCEDURE — 99999 PR PBB SHADOW E&M-EST. PATIENT-LVL IV: CPT | Mod: PBBFAC,,, | Performed by: FAMILY MEDICINE

## 2019-05-21 PROCEDURE — 99214 PR OFFICE/OUTPT VISIT, EST, LEVL IV, 30-39 MIN: ICD-10-PCS | Mod: S$GLB,,, | Performed by: FAMILY MEDICINE

## 2019-05-21 PROCEDURE — 3008F BODY MASS INDEX DOCD: CPT | Mod: CPTII,S$GLB,, | Performed by: FAMILY MEDICINE

## 2019-05-21 PROCEDURE — 3077F PR MOST RECENT SYSTOLIC BLOOD PRESSURE >= 140 MM HG: ICD-10-PCS | Mod: CPTII,S$GLB,, | Performed by: FAMILY MEDICINE

## 2019-05-21 PROCEDURE — 3078F PR MOST RECENT DIASTOLIC BLOOD PRESSURE < 80 MM HG: ICD-10-PCS | Mod: CPTII,S$GLB,, | Performed by: FAMILY MEDICINE

## 2019-05-21 PROCEDURE — 3077F SYST BP >= 140 MM HG: CPT | Mod: CPTII,S$GLB,, | Performed by: FAMILY MEDICINE

## 2019-05-21 PROCEDURE — 3045F PR MOST RECENT HEMOGLOBIN A1C LEVEL 7.0-9.0%: CPT | Mod: CPTII,S$GLB,, | Performed by: FAMILY MEDICINE

## 2019-05-21 PROCEDURE — 36415 COLL VENOUS BLD VENIPUNCTURE: CPT | Mod: PO

## 2019-05-21 PROCEDURE — 83036 HEMOGLOBIN GLYCOSYLATED A1C: CPT

## 2019-05-21 PROCEDURE — 3008F PR BODY MASS INDEX (BMI) DOCUMENTED: ICD-10-PCS | Mod: CPTII,S$GLB,, | Performed by: FAMILY MEDICINE

## 2019-05-21 PROCEDURE — 80048 BASIC METABOLIC PNL TOTAL CA: CPT

## 2019-05-21 RX ORDER — DICLOFENAC SODIUM 10 MG/G
2 GEL TOPICAL 2 TIMES DAILY
Qty: 200 G | Refills: 5 | Status: SHIPPED | OUTPATIENT
Start: 2019-05-21

## 2019-05-21 RX ORDER — CLONIDINE HYDROCHLORIDE 0.1 MG/1
TABLET ORAL
Qty: 30 TABLET | Refills: 2 | Status: SHIPPED | OUTPATIENT
Start: 2019-05-21 | End: 2019-07-26 | Stop reason: SDUPTHER

## 2019-05-21 NOTE — PROGRESS NOTES
Chief Complaint   Patient presents with    Hypertension    Diabetes       Cindy Billingsley is a 58 y.o. female who presents per the Chief Complaint.  Pt is known to me and was last seen by me on 2/26/2019.  All known chronic medical issues have been documented.       Hypertension   This is a chronic problem. The current episode started more than 1 year ago. The problem has been waxing and waning since onset. The problem is uncontrolled. Associated symptoms include anxiety, blurred vision and headaches. Pertinent negatives include no chest pain, neck pain, palpitations, peripheral edema, shortness of breath or sweats. There are no associated agents to hypertension. Risk factors for coronary artery disease include diabetes mellitus, dyslipidemia and post-menopausal state. Past treatments include angiotensin blockers and diuretics. The current treatment provides moderate improvement. Compliance problems include psychosocial issues.  Hypertensive end-organ damage includes retinopathy. There is no history of angina, kidney disease, CAD/MI, CVA, heart failure, left ventricular hypertrophy or PVD. There is no history of chronic renal disease, coarctation of the aorta, hyperaldosteronism, hypercortisolism, hyperparathyroidism, a hypertension causing med, pheochromocytoma, renovascular disease, sleep apnea or a thyroid problem.   Diabetes   She presents for her follow-up diabetic visit. She has type 2 diabetes mellitus. Her disease course has been worsening. Hypoglycemia symptoms include headaches. Pertinent negatives for hypoglycemia include no confusion, dizziness, mood changes, nervousness/anxiousness, seizures, sleepiness, speech difficulty, sweats or tremors. Associated symptoms include blurred vision, foot paresthesias and visual change. Pertinent negatives for diabetes include no chest pain, no fatigue, no foot ulcerations, no polydipsia, no polyphagia, no polyuria, no weakness and no weight loss. There are no  hypoglycemic complications. Symptoms are worsening. Diabetic complications include peripheral neuropathy and retinopathy. Pertinent negatives for diabetic complications include no autonomic neuropathy, CVA, heart disease, impotence, nephropathy or PVD. Risk factors for coronary artery disease include diabetes mellitus, hypertension and stress. Current diabetic treatment includes insulin injections and oral agent (monotherapy). She is compliant with treatment most of the time. Her weight is stable. She is following a generally healthy diet. She has not had a previous visit with a dietitian. She participates in exercise intermittently. An ACE inhibitor/angiotensin II receptor blocker is being taken. She does not see a podiatrist.Eye exam is current.        ROS  Review of Systems   Constitutional: Negative.  Negative for activity change, appetite change, chills, diaphoresis, fatigue, fever, unexpected weight change and weight loss.   HENT: Negative.  Negative for congestion, ear pain, hearing loss, nosebleeds, postnasal drip, rhinorrhea, sinus pressure, sneezing, sore throat and trouble swallowing.    Eyes: Positive for blurred vision. Negative for pain and visual disturbance.   Respiratory: Negative for cough, choking and shortness of breath.    Cardiovascular: Negative for chest pain, palpitations and leg swelling.   Gastrointestinal: Negative for abdominal pain, constipation, diarrhea, nausea and vomiting.   Endocrine: Negative for polydipsia, polyphagia and polyuria.   Genitourinary: Negative for difficulty urinating, dysuria, frequency, impotence and urgency.   Musculoskeletal: Negative.  Negative for arthralgias, back pain, gait problem, joint swelling, myalgias and neck pain.   Skin: Negative.    Allergic/Immunologic: Negative for environmental allergies and food allergies.   Neurological: Positive for headaches. Negative for dizziness, tremors, seizures, syncope, speech difficulty, weakness and  "light-headedness.   Psychiatric/Behavioral: Positive for sleep disturbance. Negative for confusion, decreased concentration and dysphoric mood. The patient is not nervous/anxious.        Physical Exam  Vitals:    05/21/19 1023   BP: (!) 156/74   Pulse: 81   Resp: 16   Temp: 98.7 °F (37.1 °C)    Body mass index is 30.17 kg/m².  Weight: 84.8 kg (186 lb 15.2 oz)   Height: 5' 6" (167.6 cm)     Physical Exam   Constitutional: She is oriented to person, place, and time. She appears well-developed and well-nourished. She is active and cooperative.  Non-toxic appearance. She does not have a sickly appearance. She does not appear ill. No distress.   HENT:   Head: Normocephalic and atraumatic.   Right Ear: Hearing and external ear normal. No decreased hearing is noted.   Left Ear: Hearing and external ear normal. No decreased hearing is noted.   Nose: Nose normal. No rhinorrhea or nasal deformity.   Mouth/Throat: Uvula is midline and oropharynx is clear and moist. She does not have dentures. Normal dentition.   Eyes: Pupils are equal, round, and reactive to light. Conjunctivae, EOM and lids are normal. Right eye exhibits no chemosis, no discharge and no exudate. No foreign body present in the right eye. Left eye exhibits no chemosis, no discharge and no exudate. No foreign body present in the left eye. No scleral icterus.   Neck: Normal range of motion and full passive range of motion without pain. Neck supple.   Cardiovascular: Normal rate, regular rhythm, S1 normal, S2 normal and normal heart sounds. Exam reveals no gallop and no friction rub.   No murmur heard.  Pulmonary/Chest: Effort normal and breath sounds normal. No accessory muscle usage. No respiratory distress. She has no decreased breath sounds. She has no wheezes. She has no rhonchi. She has no rales.   Abdominal: Soft. Normal appearance. She exhibits no distension. There is no hepatosplenomegaly. There is no tenderness. There is no rigidity, no rebound and no " guarding.   Musculoskeletal: Normal range of motion.   Neurological: She is alert and oriented to person, place, and time. She has normal strength. No cranial nerve deficit or sensory deficit. She exhibits normal muscle tone. She displays no seizure activity. Coordination and gait normal.   Skin: Skin is warm, dry and intact. No rash noted. She is not diaphoretic.   Psychiatric: She has a normal mood and affect. Her speech is normal and behavior is normal. Judgment and thought content normal. Cognition and memory are normal. She is attentive.       Assessment & Plan    Discussion of plan of care including treatment options regarding health and wellness were reviewed and discussed with patient.  Any changes to medication or treatment plan, as well as any screening blood test, imaging, or referrals to specialist, are documented.  Follow up as indicated.     1. Essential hypertension  Patient was counseled and encouraged to maintain a low sodium diet, as well as increasing physical activity.  Recommend random BP checks at home on a regular basis.  Repeat BP at end of visit was not necessary. Will continue medication at this time, and follow up in 3-6 months, or sooner if blood pressure begins to increase.  Recommend starting clonidine as needed  - cloNIDine (CATAPRES) 0.1 MG tablet; One tab as needed for systolic BP over 150  Dispense: 30 tablet; Refill: 2  - Basic metabolic panel; Future    2. Type 2 diabetes mellitus with diabetic polyneuropathy, with long-term current use of insulin  Patient is encouraged to follow a diet low in carbohydrates and simple sugars.  Discussed simple vs. complex carbohydrates as well as eating times of certain meals. Advised to focus on good food choices and increased physical activity and encouraged to adhere to medication regimen and/or lifestyle adjustments, and to check glucose level as recommended.  Contact office if glucose levels are not improving over time.  Screening blood test  is due at this time.     - Ambulatory referral to Endocrinology    3. MONIQUE (obstructive sleep apnea)  Recommended patient begin using AutoPAP daily; advised elevated BP may be associated with MONIQUE.  Also advised to hold sleeping medication with use of AutoPAP.  - CPAP FOR HOME USE    4. Primary osteoarthritis of both knees  Patient is advised that arthritis is a result of regular daily use, and is caused by inflammation in the joint.  Patient was encouraged to exercise as tolerated, and attempt weight loss with sensible diet and lifestyle changes.  Patient was recommended to continue NSAID therapy to reduce inflammation, and to incorporate stretching into a daily routine.  Pain medication will be prescribed as necessary.  Patient should follow up if pain is not well controlled.   - diclofenac sodium (VOLTAREN) 1 % Gel; Apply 2 g topically 2 (two) times daily.  Dispense: 200 g; Refill: 5       Follow up in about 3 months (around 8/21/2019).        ACTIVE MEDICAL ISSUES:  Documented in Problem List    PAST MEDICAL HISTORY  Documented    PAST SURGICAL HISTORY:  Documented    SOCIAL HISTORY:  Documented    FAMILY HISTORY:  Documented    ALLERGIES AND MEDICATIONS: updated and reviewed.  Documented    Health Maintenance       Date Due Completion Date    Lipid Panel 03/19/2019 3/19/2018    Override on 1/12/2015: Done (future)    Hemoglobin A1c 05/12/2019 2/12/2019    Override on 6/26/2015: Done (will do future ,  appoint 06/29/15)    Foot Exam 06/26/2019 6/26/2018    Override on 6/26/2018: Done    Override on 6/12/2017: Done    Override on 6/26/2015: Done (today)    Influenza Vaccine 08/01/2019 11/8/2018    Override on 10/30/2015: Done    Override on 6/26/2015: Declined (not at this present time)    Override on 10/16/2014: Done    Eye Exam 03/14/2020 3/14/2019    Override on 6/19/2017: Done (Dr Enrique)    Override on 12/16/2016: Done (Dr. Royal)    Override on 4/1/2016: Done (Dr Brown)    Override on 6/26/2015:  Declined ( she will go until 08/2015)    Override on 6/24/2014: Declined (had one done)    Mammogram 05/20/2020 5/20/2018    Low Dose Statin 05/21/2020 5/21/2019    TETANUS VACCINE 08/01/2020 8/1/2010    Pap Smear with HPV Cotest 01/29/2022 1/29/2019    Colonoscopy 11/16/2027 11/16/2017

## 2019-06-01 ENCOUNTER — PATIENT MESSAGE (OUTPATIENT)
Dept: FAMILY MEDICINE | Facility: CLINIC | Age: 59
End: 2019-06-01

## 2019-06-14 ENCOUNTER — TELEPHONE (OUTPATIENT)
Dept: ENDOCRINOLOGY | Facility: CLINIC | Age: 59
End: 2019-06-14

## 2019-06-14 NOTE — TELEPHONE ENCOUNTER
I'm unable to find pt a sooner apt than what she's been scheduled. NP Soha is booked up , pt states she'e ok with being put on wait list for sooner apt. Please advise

## 2019-06-14 NOTE — TELEPHONE ENCOUNTER
----- Message from Anisa Samson sent at 6/13/2019  1:40 PM CDT -----  Contact: pt   Name of Who is Calling: BABITA COPE [[id number]]    What is the request in detail: Patient is requesting a sooner appointment for medication management for type 2 diabetes......Please contact to further discuss and advise      Can the clinic reply by [facility]: No     What Number to [facility] Back [facility]: [phone].

## 2019-06-17 RX ORDER — METOPROLOL SUCCINATE 100 MG/1
TABLET, EXTENDED RELEASE ORAL
Qty: 90 TABLET | Refills: 3
Start: 2019-06-17 | End: 2019-08-28

## 2019-06-17 NOTE — PROGRESS NOTES
Last 5 Patient Entered Readings                                      Current 30 Day Average: 131/80     Recent Readings 6/11/2019 6/9/2019 6/4/2019 6/3/2019 6/2/2019    SBP (mmHg) 142 132 132 122 127    DBP (mmHg) 75 80 82 80 87    Pulse 91 95 90 91 77          Left voicemail.  BP trending up  PCP appointment 5/22 notes clonidine prescribed as needed for SBP > 150 mmHg  Follow up if patient using CPAP for MONIQUE

## 2019-06-18 ENCOUNTER — HOSPITAL ENCOUNTER (OUTPATIENT)
Dept: RADIOLOGY | Facility: HOSPITAL | Age: 59
Discharge: HOME | End: 2019-06-18
Attending: NURSE PRACTITIONER
Payer: COMMERCIAL

## 2019-06-18 ENCOUNTER — PATIENT OUTREACH (OUTPATIENT)
Dept: OTHER | Facility: OTHER | Age: 59
End: 2019-06-18

## 2019-06-18 VITALS — WEIGHT: 186 LBS | HEIGHT: 66 IN | BODY MASS INDEX: 29.89 KG/M2

## 2019-06-18 DIAGNOSIS — Z12.31 ENCOUNTER FOR SCREENING MAMMOGRAM FOR BREAST CANCER: ICD-10-CM

## 2019-06-18 PROCEDURE — 77063 MAMMO DIGITAL SCREENING BILAT WITH TOMOSYNTHESIS_CAD: ICD-10-PCS | Mod: 26,,, | Performed by: RADIOLOGY

## 2019-06-18 PROCEDURE — 77067 SCR MAMMO BI INCL CAD: CPT | Mod: TC

## 2019-06-18 PROCEDURE — 77067 SCR MAMMO BI INCL CAD: CPT | Mod: 26,,, | Performed by: RADIOLOGY

## 2019-06-18 PROCEDURE — 77067 MAMMO DIGITAL SCREENING BILAT WITH TOMOSYNTHESIS_CAD: ICD-10-PCS | Mod: 26,,, | Performed by: RADIOLOGY

## 2019-06-18 PROCEDURE — 77063 BREAST TOMOSYNTHESIS BI: CPT | Mod: 26,,, | Performed by: RADIOLOGY

## 2019-06-18 NOTE — PROGRESS NOTES
Last 5 Patient Entered Readings                                      Current 30 Day Average: 131/80     Recent Readings 6/11/2019 6/9/2019 6/4/2019 6/3/2019 6/2/2019    SBP (mmHg) 142 132 132 122 127    DBP (mmHg) 75 80 82 80 87    Pulse 91 95 90 91 77          6/18: Spoke with pharmacist yesterday and has appt today.  Pushing call back 2 weeks.

## 2019-06-21 ENCOUNTER — OFFICE VISIT (OUTPATIENT)
Dept: ENDOCRINOLOGY | Facility: CLINIC | Age: 59
End: 2019-06-21
Payer: COMMERCIAL

## 2019-06-21 VITALS
BODY MASS INDEX: 30.6 KG/M2 | WEIGHT: 189.63 LBS | DIASTOLIC BLOOD PRESSURE: 84 MMHG | SYSTOLIC BLOOD PRESSURE: 150 MMHG | HEART RATE: 88 BPM

## 2019-06-21 DIAGNOSIS — I10 ESSENTIAL HYPERTENSION: ICD-10-CM

## 2019-06-21 DIAGNOSIS — Z79.4 TYPE 2 DIABETES MELLITUS WITH DIABETIC POLYNEUROPATHY, WITH LONG-TERM CURRENT USE OF INSULIN: Primary | ICD-10-CM

## 2019-06-21 DIAGNOSIS — E66.9 NON MORBID OBESITY, UNSPECIFIED OBESITY TYPE: ICD-10-CM

## 2019-06-21 DIAGNOSIS — Z71.9 VISIT FOR COUNSELING: ICD-10-CM

## 2019-06-21 DIAGNOSIS — E11.42 TYPE 2 DIABETES MELLITUS WITH DIABETIC POLYNEUROPATHY, WITH LONG-TERM CURRENT USE OF INSULIN: Primary | ICD-10-CM

## 2019-06-21 DIAGNOSIS — R80.9 MICROALBUMINURIA: ICD-10-CM

## 2019-06-21 PROCEDURE — 99215 OFFICE O/P EST HI 40 MIN: CPT | Mod: S$GLB,,, | Performed by: NURSE PRACTITIONER

## 2019-06-21 PROCEDURE — 3077F SYST BP >= 140 MM HG: CPT | Mod: CPTII,S$GLB,, | Performed by: NURSE PRACTITIONER

## 2019-06-21 PROCEDURE — 99999 PR PBB SHADOW E&M-EST. PATIENT-LVL V: CPT | Mod: PBBFAC,,, | Performed by: NURSE PRACTITIONER

## 2019-06-21 PROCEDURE — 3079F PR MOST RECENT DIASTOLIC BLOOD PRESSURE 80-89 MM HG: ICD-10-PCS | Mod: CPTII,S$GLB,, | Performed by: NURSE PRACTITIONER

## 2019-06-21 PROCEDURE — 3045F PR MOST RECENT HEMOGLOBIN A1C LEVEL 7.0-9.0%: ICD-10-PCS | Mod: CPTII,S$GLB,, | Performed by: NURSE PRACTITIONER

## 2019-06-21 PROCEDURE — 99215 PR OFFICE/OUTPT VISIT, EST, LEVL V, 40-54 MIN: ICD-10-PCS | Mod: S$GLB,,, | Performed by: NURSE PRACTITIONER

## 2019-06-21 PROCEDURE — 3077F PR MOST RECENT SYSTOLIC BLOOD PRESSURE >= 140 MM HG: ICD-10-PCS | Mod: CPTII,S$GLB,, | Performed by: NURSE PRACTITIONER

## 2019-06-21 PROCEDURE — 3008F BODY MASS INDEX DOCD: CPT | Mod: CPTII,S$GLB,, | Performed by: NURSE PRACTITIONER

## 2019-06-21 PROCEDURE — 3045F PR MOST RECENT HEMOGLOBIN A1C LEVEL 7.0-9.0%: CPT | Mod: CPTII,S$GLB,, | Performed by: NURSE PRACTITIONER

## 2019-06-21 PROCEDURE — 99999 PR PBB SHADOW E&M-EST. PATIENT-LVL V: ICD-10-PCS | Mod: PBBFAC,,, | Performed by: NURSE PRACTITIONER

## 2019-06-21 PROCEDURE — 3079F DIAST BP 80-89 MM HG: CPT | Mod: CPTII,S$GLB,, | Performed by: NURSE PRACTITIONER

## 2019-06-21 PROCEDURE — 3008F PR BODY MASS INDEX (BMI) DOCUMENTED: ICD-10-PCS | Mod: CPTII,S$GLB,, | Performed by: NURSE PRACTITIONER

## 2019-06-21 RX ORDER — INSULIN GLARGINE 300 [IU]/ML
INJECTION, SOLUTION SUBCUTANEOUS
Qty: 12 SYRINGE | Refills: 1 | Status: SHIPPED | OUTPATIENT
Start: 2019-06-21 | End: 2020-01-06

## 2019-06-21 RX ORDER — METFORMIN HYDROCHLORIDE 500 MG/1
1000 TABLET, EXTENDED RELEASE ORAL DAILY
Qty: 180 TABLET | Refills: 2 | Status: SHIPPED | OUTPATIENT
Start: 2019-06-21 | End: 2020-01-08

## 2019-06-21 NOTE — PROGRESS NOTES
CC: This 58 y.o. Black or  female  is here for evaluation of  T2DM along with comorbidities indicated in the Visit Diagnosis section of this encounter.    HPI: Cindy Billingsley was diagnosed with T2DM in 1996. Prior to that had gestational diabetes with her first child at the age of 28. She started treatment on Glucophage - eventually Glucotrol was added. Lantus insulin was started ~ 2006 and she started on Novolog in March of 2012. In 3/15, A1c of 9.7% trended down to 7.4% with addition of Bydureon.     Last seen by ISHMAEL Barrett NP in 6/2018. New to me.   She has MONIQUE and will  her cpap machine next week. Has never used it before.   Reports diarrhea on metformin xr 1500 mg/day. Had no diarrhea on prior dose of metformin 1000 mg once daily.   Her BGs have been worse for months and she does not know why since diet is not that different.     She has gained about 9 lb however over the last year.     She saw Dr. Myrick in Bariatrics last year and was started on diethylpropion with some weight loss but she had to stop d/t palpitations.     LAST DIABETES EDUCATION: 2012    HOSPITALIZED FOR DIABETES -  No.    PRESCRIBED DIABETES MEDICATIONS: Metformin xr 750 mg bid, Toujeo 48 units AM, Victoza 1.8 mg every day, Humalog 10 units before meals with correction scale: 150-200=+2, 201-250=+4; 251-300=+6; 301-350=+8, over 350=+10 units      Misses medication doses - Yes - misses Humalog before dinner as well as overnight     DM COMPLICATIONS: nephropathy    SIGNIFICANT DIABETES MED HISTORY: metformin as above.     SELF MONITORING BLOOD GLUCOSE: Checks blood glucose at home 3x/day. She uses Relion meter. she uses two meters and the one she brings today has old data.   Before breakfast after work - 170-200s  Fasting predinner - about the same   At work - about the same     As high as 300s     HYPOGLYCEMIC EPISODES: none      CURRENT DIET: drinks water. Admits that she isn't eating healthy and that's why her BGs  "are high. Likes hash browns for breakfast.   Eats breakfast at home when she gets home from work. Next meal is at "lunch" 6-7 pm before work. Sometimes eats at 3-4 am even on her off days. Rarely eats in the afternoon    Diet recall: 1 slice of toast and eggs - which is a bit better than usual for her, water; 2 hot dogs on bun with chili at work. A bag of chips.     CURRENT EXERCISE: none in the last week but started back at the gym walking on treadmill x 30 minutes about once or twice a week.     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 But lately 4-5 days/week       BP (!) 159/94 (BP Location: Right arm, Patient Position: Sitting, BP Method: Large (Automatic))   Pulse 88   Wt 86 kg (189 lb 9.5 oz)   LMP 03/03/2016 (LMP Unknown)   BMI 30.60 kg/m²     ROS:   CONSTITUTIONAL: Appetite good, denies fatigue  EYES: + visual disturbances  RESPIRATORY: No shortness of breath   CARDIAC: No chest pain   GI: No nausea, vomiting, + diarrhea   : + urinary frequency with hctz; no dysuria   OTHER: n/a        PHYSICAL EXAM:  GENERAL: Well developed, well nourished. No acute distress.   PSYCH: AAOx3, appropriate mood and affect, conversant, well-groomed. Judgement and insight good.   NEURO: Cranial nerves grossly intact. Speech clear, no tremor.   NECK: Trachea midline, no thyromegaly or lymphadenopathy.   CHEST: Respirations even and unlabored. CTA bilaterally.  CARDIOVASCULAR: Regular rate and rhythm. No bruits. No murmur. No edema.   SKIN: Normal skin turgor. Skin warm and dry. No areas of breakdown. + acanthosis nigricans.          Hemoglobin A1C   Date Value Ref Range Status   05/21/2019 8.3 (H) 4.0 - 5.6 % Final     Comment:     ADA Screening Guidelines:  5.7-6.4%  Consistent with prediabetes  >or=6.5%  Consistent with diabetes  High levels of fetal hemoglobin interfere with the HbA1C  assay. Heterozygous hemoglobin variants (HbS, HgC, etc)do  not " significantly interfere with this assay.   However, presence of multiple variants may affect accuracy.     02/12/2019 8.2 (H) 4.0 - 5.6 % Final     Comment:     ADA Screening Guidelines:  5.7-6.4%  Consistent with prediabetes  >or=6.5%  Consistent with diabetes  High levels of fetal hemoglobin interfere with the HbA1C  assay. Heterozygous hemoglobin variants (HbS, HgC, etc)do  not significantly interfere with this assay.   However, presence of multiple variants may affect accuracy.     10/19/2018 7.7 (H) 4.0 - 5.6 % Final     Comment:     ADA Screening Guidelines:  5.7-6.4%  Consistent with prediabetes  >or=6.5%  Consistent with diabetes  High levels of fetal hemoglobin interfere with the HbA1C  assay. Heterozygous hemoglobin variants (HbS, HgC, etc)do  not significantly interfere with this assay.   However, presence of multiple variants may affect accuracy.             Chemistry        Component Value Date/Time     05/21/2019 1130    K 4.0 05/21/2019 1130     05/21/2019 1130    CO2 27 05/21/2019 1130    BUN 14 05/21/2019 1130    CREATININE 0.8 05/21/2019 1130     (H) 05/21/2019 1130        Component Value Date/Time    CALCIUM 10.1 05/21/2019 1130    ALKPHOS 131 09/24/2018 1258    AST 20 09/24/2018 1258    ALT 19 09/24/2018 1258    BILITOT 0.5 09/24/2018 1258    ESTGFRAFRICA >60.0 05/21/2019 1130    EGFRNONAA >60.0 05/21/2019 1130          Lab Results   Component Value Date    LDLCALC 70.6 05/21/2019        Ref. Range 5/21/2019 11:30   Cholesterol Latest Ref Range: 120 - 199 mg/dL 143   HDL Latest Ref Range: 40 - 75 mg/dL 38 (L)   Hdl/Cholesterol Ratio Latest Ref Range: 20.0 - 50.0 % 26.6   LDL Cholesterol External Latest Ref Range: 63.0 - 159.0 mg/dL 70.6   Non-HDL Cholesterol Latest Units: mg/dL 105   Total Cholesterol/HDL Ratio Latest Ref Range: 2.0 - 5.0  3.8   Triglycerides Latest Ref Range: 30 - 150 mg/dL 172 (H)     Lab Results   Component Value Date    MICALBCREAT 81.6 (H) 02/26/2018              ASSESSMENT and PLAN:    A1C GOAL: < 7 %     1. Type 2 diabetes mellitus with diabetic polyneuropathy, with long-term current use of insulin  Due to diarrhea and difficulty with BID dosing, stop metformin xr 750 mg tablets. . Switch to metformin  mg tablets and take 2 tablets once daily (=1000 mg once daily).     Stop Victoza and switch to Ozempic 1 mg once weekly. May need a prior authorization.     Improve adherence with Humalog 10 units before meals with same correction scale - Use on premeal readings: 150-200=+2, 201-250=+4; 251-300=+6; 301-350=+8, over 350=+10 units    Increase Toujeo to 58 units every morning.     Start exercise routine.   See Diabetes Educator/Registered Dietician for Medical Nutrition Therapy.     Monitor blood sugars before meals and bedtime. rx for Freestyle Malka sent.     Return to clinic in 2 months with labs prior.           Ambulatory Referral to Diabetes Education    Hemoglobin A1c   2. Microalbuminuria  Microalbumin/creatinine urine ratio   3. Essential hypertension  Lifestyle changes as above.   She is in the digital HTN program.    4. Non morbid obesity, unspecified obesity type  Increases insulin resistance.            Spent 50 minutes with patient with >50% time spent in counseling, as noted in # 1-4.            Orders Placed This Encounter   Procedures    Microalbumin/creatinine urine ratio     Standing Status:   Future     Standing Expiration Date:   8/19/2020     Order Specific Question:   Specimen Source     Answer:   Urine    Hemoglobin A1c     Standing Status:   Future     Standing Expiration Date:   8/19/2020    Ambulatory Referral to Diabetes Education     Referral Priority:   Routine     Referral Type:   Consultation     Referral Reason:   Specialty Services Required     Requested Specialty:   Endocrinology     Number of Visits Requested:   1     Expiration Date:   6/21/2020        Follow up in about 2 months (around 8/21/2019).     Thank you very  much for allowing me to participate in Cindy Billingsley's care.

## 2019-06-21 NOTE — LETTER
June 21, 2019      Azikiwe K. Lombard, MD  [address] [facility]  [place] [address]           [facility]  [address] [facility], [address]  [place] [address]  Phone: [phone]  Fax: [phone]          Patient: Cindy Billingsley   MR Number: [id number]   YOB: 1960   Date of Visit: 6/21/2019       Dear Dr. Azikiwe K. Lombard:    Thank you for referring Cindy Billingsley to me for evaluation. Attached you will find relevant portions of my assessment and plan of care.    If you have questions, please do not hesitate to call me. I look forward to following Cindy Billingsley along with you.    Sincerely,    Daxa Hoyos NP    Enclosure  CC:  No Recipients    If you would like to receive this communication electronically, please contact [email or web address] or [phone] to request more information on [employer] access.    For providers and/or their staff who would like to refer a patient to [facility], please contact us through our one-stop-shop provider referral line, [facility] , at [phone].    If you feel you have received this communication in error or would no longer like to receive these types of communications, please e-mail [email or web address]

## 2019-06-21 NOTE — PATIENT INSTRUCTIONS
Due to diarrhea and difficulty with BID dosing, stop metformin xr 750 mg tablets. . Switch to metformin  mg tablets and take 2 tablets once daily (=1000 mg once daily).     Stop Victoza and switch to Ozempic 1 mg once weekly. May need a prior authorization.     Improve adherence with Humalog 10 units before meals with same correction scale - Use on premeal readings: 150-200=+2, 201-250=+4; 251-300=+6; 301-350=+8, over 350=+10 units    Increase Toujeo to 58 units every morning.     Start exercise routine.   See Diabetes Educator/Registered Dietician for Medical Nutrition Therapy.     Monitor blood sugars before meals and bedtime. rx for Freestyle Malka sent.     Return to clinic in 2 months with labs prior.

## 2019-06-24 ENCOUNTER — PATIENT MESSAGE (OUTPATIENT)
Dept: ADMINISTRATIVE | Facility: OTHER | Age: 59
End: 2019-06-24

## 2019-06-24 ENCOUNTER — CLINICAL SUPPORT (OUTPATIENT)
Dept: DIABETES | Facility: CLINIC | Age: 59
End: 2019-06-24
Payer: COMMERCIAL

## 2019-06-24 VITALS — BODY MASS INDEX: 30.57 KG/M2 | WEIGHT: 189.38 LBS

## 2019-06-24 DIAGNOSIS — E11.9 LONG-TERM CURRENT USE OF INSULIN FOR DIABETES MELLITUS: ICD-10-CM

## 2019-06-24 DIAGNOSIS — Z79.4 LONG-TERM CURRENT USE OF INSULIN FOR DIABETES MELLITUS: ICD-10-CM

## 2019-06-24 DIAGNOSIS — E11.42 TYPE 2 DIABETES MELLITUS WITH DIABETIC POLYNEUROPATHY, WITH LONG-TERM CURRENT USE OF INSULIN: Primary | ICD-10-CM

## 2019-06-24 DIAGNOSIS — Z79.4 TYPE 2 DIABETES MELLITUS WITH DIABETIC POLYNEUROPATHY, WITH LONG-TERM CURRENT USE OF INSULIN: Primary | ICD-10-CM

## 2019-06-24 DIAGNOSIS — E11.42 DIABETIC PERIPHERAL NEUROPATHY: ICD-10-CM

## 2019-06-24 PROCEDURE — G0108 DIAB MANAGE TRN  PER INDIV: HCPCS | Mod: S$GLB,,, | Performed by: DIETITIAN, REGISTERED

## 2019-06-24 PROCEDURE — G0108 PR DIAB MANAGE TRN  PER INDIV: ICD-10-PCS | Mod: S$GLB,,, | Performed by: DIETITIAN, REGISTERED

## 2019-06-24 NOTE — PROGRESS NOTES
Diabetes Education  Author: Paty Chong RD  Date: 6/24/2019    Diabetes Care Management Summary  Pt visit today focused on re-introducing pt to diabetes self management w emphasis on CHO awareness/counting, meal planning/label reading, SMBG, exercise, and meds. Pt attended Diabetes Education sessions in 2012 and 2015    Diabetes Education Record Assessment/Progress: Initial  Current Diabetes Risk Level: Moderate     Last A1c:   Lab Results   Component Value Date    HGBA1C 8.3 (H) 05/21/2019     Last visit with Diabetes Educator: : 06/24/2019      Diabetes Type  Diabetes Type : Type II    Diabetes History  Diabetes Diagnosis: 5-10 years  Current Treatment: Diet, Injectable, Oral Medication, Insulin  Reviewed Problem List with Patient: Yes    Health Maintenance was reviewed today with patient. Discussed with patient importance of routine eye exams, foot exams/foot care, blood work (i.e.: A1c, microalbumin, and lipid), dental visits, yearly flu vaccine, and pneumonia vaccine as indicated by PCP. Patient verbalized understanding.     Health Maintenance Topics with due status: Not Due       Topic Last Completion Date    TETANUS VACCINE 08/01/2010    Colonoscopy 11/16/2017    Influenza Vaccine 11/08/2018    Pap Smear with HPV Cotest 01/29/2019    Eye Exam 03/14/2019    Lipid Panel 05/21/2019    Hemoglobin A1c 05/21/2019    Mammogram 06/18/2019    Low Dose Statin 06/21/2019     Health Maintenance Due   Topic Date Due    Foot Exam  06/26/2019       Nutrition  Meal Planning: water, snacks between meal, artificial sweeteners    Monitoring   Monitoring: Other  Self Monitoring : START FreeStyle Malka today  Blood Glucose Logs: No  Do you use a personal continuous glucose monitor?: Yes  What kind of glucose monitor do you use?: Freestyle Malka Flash  In the last month, how often have you had a low blood sugar reaction?: never  Can you tell when your blood sugar is too high?: no    Exercise   Exercise Type: none(active at  work and cannot find time when off)    Current Diabetes Treatment   Current Treatment: Diet, Injectable, Oral Medication, Insulin    Social History  Preferred Learning Method: Face to Face, Group Education  Primary Support: Self, Family  Educational Level: College Graduate  Occupation: night shift psyc nurse  Smoking Status: Never a Smoker  Alcohol Use: Never  Barriers to Change: None  Learning Challenges : None  Readiness to Learn : Eager  Cultural Influences: No    Diabetes Education Assessment/Progress  Diabetes Disease Process (diabetes disease process and treatment options): Discussion, Instructed, Written Materials Provided, Not Covered/Deferred, Individual Session  -Pt states A1c has been slowly rising in last year- 7.7, 8.2, 8.3% and realizes she needs to apply lifestyle changes to improve these values    Nutrition (Incorporating nutritional management into one's lifestyle): Individual Session, Written Materials Provided, Instructed, Discussion, Comprehends Key Points  -Pt states she has a hard time pre-prepping meals to bring to work at night. Discussed using pre-packaged frozen foods or freshly made meals such as [employer] for meals.  -Discussed appropriate meal plan during work week with Bkfst at 6pm, lunch at 11pm, dinner at 4am and bedtime snack at 8am.   -Discussed carb vs non-carb foods. Discussed appropriate amount of carbs to have at meals/snacks. Discussed appropriate serving sizes of individual carb items.   -Reviewed label reading, portion control (hand) and using the plate method of meal planning.   -Instructed pt to aim for 3 evenly-spaced meals with 30-45 gm carb/meal and 0-15 gm at snacks.    Physical Activity (incorporating physical activity into one's lifestyle): Individual Session, Written Materials Provided, Instructed, Discussion, Comprehends Key Points  -pt works 7pm to 7 am psyc nursing shift and states she does not have time to go to gym to work out. Discussed home exercise  "options as well as discussed benefits of physical activity on BG control .  - Encouraged pt to start a walking program for a total of 150 minutes per week while  keeping 3 exercise components in mind: Frequency- 3-5x/wk, Duration- 30 min, Intensity- can say name but "not sing a song"    Medications (states correct name, dose, onset, peak, duration, side effects & timing of meds): Instructed, Discussion, Written Materials Provided, Individual Session, Demonstrates Understanding/Competency(verbalizes/demonstrates)  -pt states she takes all meds except MTI in morning before going to sleep for 8 hours    Monitoring (monitoring blood glucose/other parameters & using results): Individual Session, Written Materials Provided, Instructed, Demonstration, Discussion, Return Demonstration, Demonstrates Understanding/Competency (verbalizes/demonstrates), Video  START CallGradere CGM system.  -Reviewed content of kit: reader, 28-day supply sensors (2), and /cord  -Reviewed YouTube video and start-up guide.   -Set up the recorder with the correct date/time and added the glucose range to 100-140  -Demonstrated sensor insertion technique and assisted pt in placing the first sensor on her left arm  -Scanned the sensor for start up  -Reviewed the significance of the information presented on the recorder once set-up complete. Informed pt  BG data available in 60 minutes with each new sensor application/insertion, Recommended pt scan sensor before every meal/at least once every 5 hours during day. Informed pt of 8 hour limit on sensor data collection  -Reviewed the components of the data reports, discussed contraindicated exposure to X-rays, MRI, CT scans, and noted affect of Vit C  and Aspirin intake on BG readings  -Explained the replacement process of sensors every 14 days. Suggestions provided on how to prevent unplanned removal of sensor and how to replace  -Pt performed sensor placement and reader set-up and scanning " correctly. Pt reminded to bring reader to clinic visit to download BG records    Acute Complications (preventing, detecting, and treating acute complications): Individual Session, Written Materials Provided, Instructed, Demonstrates Understanding/Competency (verbalizes/demonstrates), Discussion  -Reviewed s/s, causes, and treatment of hyperglycemia and hypoglycemia    Chronic Complications (preventing, detecting, and treating chronic complications): Discussion, Individual Session, Written Materials Provided, Instructed, Demonstrates Understanding/Competency (verbalizes/demonstrates)  Cognitive (knowledge of self-management skills, functional health literacy): Discussion  Psychosocial (emotional response to diabetes): Discussion  Diabetes Distress and Support Systems: Discussion  Behavioral (readiness for change, lifestyle practices, self-care behaviors): Discussion    Goals  Patient has selected/evaluated goals during today's session: Yes, selected  Healthy Eating: Set(distribute carbs into 3 evenly spaced meals (6pm, 11 pm, 4am, 8am ) plus 1 snack/day, 30-45 g carb/meal)  Start Date: 06/24/19  Target Date: 07/24/19  Physical Activity: Set(execise 3-5x/wk, 30 min duration)  Start Date: 06/24/19  Target Date: 07/24/19    Diabetes Care Plan/Intervention  Education Plan/Intervention: Individual Follow-Up DSMT(contact information provided; pt will schedule follow up)    Diabetes Meal Plan  Restrictions: Restricted Carbohydrate  Carbohydrate Per Meal: 30-45g  Carbohydrate Per Snack : 15-20g    Today's Self-Management Care Plan was developed with the patient's input and is based on barriers identified during today's assessment.    The long and short-term goals in the care plan were written with the patient/caregiver's input. The patient has agreed to work toward these goals to improve her overall diabetes control.      The patient received a copy of today's self-management plan and verbalized understanding of the care  plan, goals, and all of today's instructions.      The patient was encouraged to communicate with her physician and care team regarding her condition(s) and treatment.  I provided the patient with my contact information today and encouraged her to contact me via phone or patient portal as needed.     Education Units of Time   Time Spent: 60 min

## 2019-06-25 ENCOUNTER — TELEPHONE (OUTPATIENT)
Dept: ENDOCRINOLOGY | Facility: CLINIC | Age: 59
End: 2019-06-25

## 2019-06-25 NOTE — TELEPHONE ENCOUNTER
----- Message from Daxa Hoyos NP sent at 6/24/2019  2:10 PM CDT -----  Yes, it's ok for her to take victoza and toujeo at the same time once daily and timing isn't that important as long as it's about every 24 hours.    The metformin should be taken TWICE daily however.

## 2019-06-28 ENCOUNTER — TELEPHONE (OUTPATIENT)
Dept: PHARMACY | Facility: CLINIC | Age: 59
End: 2019-06-28

## 2019-07-01 RX ORDER — ATORVASTATIN CALCIUM 40 MG/1
40 TABLET, FILM COATED ORAL NIGHTLY
Qty: 90 TABLET | Refills: 3 | Status: SHIPPED | OUTPATIENT
Start: 2019-07-01 | End: 2020-08-08 | Stop reason: SDUPTHER

## 2019-07-02 NOTE — PROGRESS NOTES
Last 5 Patient Entered Readings                                      Current 30 Day Average: 137/84     Recent Readings 6/27/2019 6/24/2019 6/21/2019 6/20/2019 6/11/2019    SBP (mmHg) 147 139 140 151 142    DBP (mmHg) 89 82 88 93 75    Pulse 95 84 83 81 91          7/2: LVM. Will call in 1 week.  Patient's BP on upward trend.

## 2019-07-03 ENCOUNTER — PATIENT MESSAGE (OUTPATIENT)
Dept: ENDOCRINOLOGY | Facility: CLINIC | Age: 59
End: 2019-07-03

## 2019-07-05 DIAGNOSIS — I10 ESSENTIAL HYPERTENSION: ICD-10-CM

## 2019-07-05 RX ORDER — VALSARTAN 320 MG/1
TABLET ORAL
Qty: 90 TABLET | Refills: 0 | Status: SHIPPED | OUTPATIENT
Start: 2019-07-05 | End: 2019-09-14 | Stop reason: SDUPTHER

## 2019-07-09 NOTE — PROGRESS NOTES
Last 5 Patient Entered Readings                                      Current 30 Day Average: 141/84     Recent Readings 7/7/2019 6/29/2019 6/28/2019 6/27/2019 6/24/2019    SBP (mmHg) 138 140 136 147 139    DBP (mmHg) 86 80 85 89 82    Pulse 86 96 95 95 84          7/9: LVM.  Will call in 2 weeks.

## 2019-07-15 ENCOUNTER — PATIENT OUTREACH (OUTPATIENT)
Dept: OTHER | Facility: OTHER | Age: 59
End: 2019-07-15

## 2019-07-15 NOTE — PROGRESS NOTES
Last 5 Patient Entered Readings                                      Current 30 Day Average: 139/85     Recent Readings 7/10/2019 7/7/2019 6/29/2019 6/28/2019 6/27/2019    SBP (mmHg) 120 138 140 136 147    DBP (mmHg) 76 86 80 85 89    Pulse 87 86 96 95 95          Left message requesting call back  Health  also attempting contact  Per patient comments, appears she has taken clonidine once  Follow up metoprolol dosing

## 2019-07-23 NOTE — PROGRESS NOTES
"Last 5 Patient Entered Readings                                      Current 30 Day Average: 135/83     Recent Readings 7/18/2019 7/10/2019 7/7/2019 6/29/2019 6/28/2019    SBP (mmHg) 124 120 138 140 136    DBP (mmHg) 82 76 86 80 85    Pulse 81 87 86 96 95          Digital Medicine: Health  Follow Up    Lifestyle Modifications:    1.Dietary Modifications (Sodium intake <2,000mg/day, food labels, dining out): Patient reports she has been cutting back on "fat free foods."  States she has cut back on fried foods and eating more baked options.    2.Physical Activity: States she is walking for 30min 3x/week.  Set SMG.    3.Medication Therapy: Patient has been compliant with the medication regimen.    4.Patient has the following medication side effects/concerns: none  (Frequency/Alleviating factors/Precipitating factors, etc.)     Follow up with Mrs. Cindy Billingsley completed. No further questions or concerns. Will continue to follow up to achieve health goals.    "

## 2019-07-26 DIAGNOSIS — I10 ESSENTIAL HYPERTENSION: ICD-10-CM

## 2019-07-26 DIAGNOSIS — F41.8 DEPRESSION WITH ANXIETY: ICD-10-CM

## 2019-07-31 RX ORDER — CLONIDINE HYDROCHLORIDE 0.1 MG/1
TABLET ORAL
Qty: 30 TABLET | Refills: 2 | Status: SHIPPED | OUTPATIENT
Start: 2019-07-31 | End: 2019-08-14 | Stop reason: SDUPTHER

## 2019-07-31 RX ORDER — CLONAZEPAM 0.5 MG/1
0.5 TABLET ORAL 2 TIMES DAILY PRN
Qty: 60 TABLET | Refills: 2 | Status: SHIPPED | OUTPATIENT
Start: 2019-07-31 | End: 2019-08-14 | Stop reason: SDUPTHER

## 2019-08-02 ENCOUNTER — PATIENT MESSAGE (OUTPATIENT)
Dept: ENDOCRINOLOGY | Facility: CLINIC | Age: 59
End: 2019-08-02

## 2019-08-14 DIAGNOSIS — F41.8 DEPRESSION WITH ANXIETY: ICD-10-CM

## 2019-08-14 DIAGNOSIS — I10 ESSENTIAL HYPERTENSION: ICD-10-CM

## 2019-08-14 RX ORDER — CLONIDINE HYDROCHLORIDE 0.1 MG/1
TABLET ORAL
Qty: 30 TABLET | Refills: 2 | Status: SHIPPED | OUTPATIENT
Start: 2019-08-14 | End: 2019-10-30

## 2019-08-14 RX ORDER — CLONAZEPAM 0.5 MG/1
0.5 TABLET ORAL 2 TIMES DAILY PRN
Qty: 60 TABLET | Refills: 2 | Status: SHIPPED | OUTPATIENT
Start: 2019-08-14 | End: 2019-12-13 | Stop reason: SDUPTHER

## 2019-08-15 NOTE — TELEPHONE ENCOUNTER
----- Message from Magda Kevin sent at 8/15/2019  8:40 AM CDT -----  Contact: Self   Type: Patient Call Back    What is the request in detail: Pharmacist calling to speak to a nurse regarding pt meds.      cloNIDine (CATAPRES) 0.1 MG tablet    Can the clinic reply by [facility]? No    Would the patient rather a call back or a response via [facility]? Call back     Best call back number:   [address] [place] [address]  Phone: [phone] Fax: [phone]

## 2019-08-15 NOTE — TELEPHONE ENCOUNTER
Spoke to Shelia,pharmacy technician. Verified the frequency of catapres. Per Dr.Lombard take one daily as needed for systolic over 150. Verbalized understanding.

## 2019-08-19 ENCOUNTER — LAB VISIT (OUTPATIENT)
Dept: LAB | Facility: HOSPITAL | Age: 59
End: 2019-08-19
Attending: NURSE PRACTITIONER
Payer: COMMERCIAL

## 2019-08-19 DIAGNOSIS — R80.9 MICROALBUMINURIA: ICD-10-CM

## 2019-08-19 LAB
ALBUMIN/CREAT UR: 30.1 UG/MG (ref 0–30)
CREAT UR-MCNC: 123 MG/DL (ref 15–325)
MICROALBUMIN UR DL<=1MG/L-MCNC: 37 UG/ML

## 2019-08-19 PROCEDURE — 82043 UR ALBUMIN QUANTITATIVE: CPT

## 2019-08-28 ENCOUNTER — OFFICE VISIT (OUTPATIENT)
Dept: OBSTETRICS AND GYNECOLOGY | Facility: CLINIC | Age: 59
End: 2019-08-28
Payer: COMMERCIAL

## 2019-08-28 VITALS
BODY MASS INDEX: 30.96 KG/M2 | WEIGHT: 191.81 LBS | DIASTOLIC BLOOD PRESSURE: 72 MMHG | SYSTOLIC BLOOD PRESSURE: 122 MMHG

## 2019-08-28 DIAGNOSIS — N76.0 ACUTE VAGINITIS: Primary | ICD-10-CM

## 2019-08-28 PROCEDURE — 3078F PR MOST RECENT DIASTOLIC BLOOD PRESSURE < 80 MM HG: ICD-10-PCS | Mod: CPTII,S$GLB,, | Performed by: NURSE PRACTITIONER

## 2019-08-28 PROCEDURE — 99213 OFFICE O/P EST LOW 20 MIN: CPT | Mod: S$GLB,,, | Performed by: NURSE PRACTITIONER

## 2019-08-28 PROCEDURE — 87481 CANDIDA DNA AMP PROBE: CPT | Mod: 59

## 2019-08-28 PROCEDURE — 3078F DIAST BP <80 MM HG: CPT | Mod: CPTII,S$GLB,, | Performed by: NURSE PRACTITIONER

## 2019-08-28 PROCEDURE — 99213 PR OFFICE/OUTPT VISIT, EST, LEVL III, 20-29 MIN: ICD-10-PCS | Mod: S$GLB,,, | Performed by: NURSE PRACTITIONER

## 2019-08-28 PROCEDURE — 3074F SYST BP LT 130 MM HG: CPT | Mod: CPTII,S$GLB,, | Performed by: NURSE PRACTITIONER

## 2019-08-28 PROCEDURE — 99999 PR PBB SHADOW E&M-EST. PATIENT-LVL III: CPT | Mod: PBBFAC,,, | Performed by: NURSE PRACTITIONER

## 2019-08-28 PROCEDURE — 99999 PR PBB SHADOW E&M-EST. PATIENT-LVL III: ICD-10-PCS | Mod: PBBFAC,,, | Performed by: NURSE PRACTITIONER

## 2019-08-28 PROCEDURE — 87661 TRICHOMONAS VAGINALIS AMPLIF: CPT

## 2019-08-28 PROCEDURE — 87491 CHLMYD TRACH DNA AMP PROBE: CPT

## 2019-08-28 PROCEDURE — 3008F BODY MASS INDEX DOCD: CPT | Mod: CPTII,S$GLB,, | Performed by: NURSE PRACTITIONER

## 2019-08-28 PROCEDURE — 3074F PR MOST RECENT SYSTOLIC BLOOD PRESSURE < 130 MM HG: ICD-10-PCS | Mod: CPTII,S$GLB,, | Performed by: NURSE PRACTITIONER

## 2019-08-28 PROCEDURE — 3008F PR BODY MASS INDEX (BMI) DOCUMENTED: ICD-10-PCS | Mod: CPTII,S$GLB,, | Performed by: NURSE PRACTITIONER

## 2019-08-28 RX ORDER — LIRAGLUTIDE 6 MG/ML
INJECTION SUBCUTANEOUS
COMMUNITY
Start: 2019-07-02 | End: 2019-08-28

## 2019-08-28 RX ORDER — CLOTRIMAZOLE AND BETAMETHASONE DIPROPIONATE 10; .64 MG/G; MG/G
CREAM TOPICAL 2 TIMES DAILY
Qty: 15 G | Refills: 3 | Status: SHIPPED | OUTPATIENT
Start: 2019-08-28 | End: 2019-09-04

## 2019-08-28 RX ORDER — METRONIDAZOLE 7.5 MG/G
1 GEL VAGINAL DAILY
Qty: 70 G | Refills: 0 | Status: SHIPPED | OUTPATIENT
Start: 2019-08-28 | End: 2019-09-02

## 2019-08-28 NOTE — PROGRESS NOTES
CC: Vaginal Discharge/ Irritation     Cindy Billingsley is a 58 y.o. female  presents with complaint of vaginal discharge for 2 weeks.  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  She reports itching.  denies odor.  She states the discharge is yellow.  Alleviating factors: none. No new sexual partners.        ROS:  GENERAL: No fever, chills, fatigability or weight loss.  VULVAR: No pain, no lesions and no itching.  VAGINAL: No relaxation, + itching, + discharge, no abnormal bleeding and no lesions.  ABDOMEN: No abdominal pain. Denies nausea. Denies vomiting. No diarrhea. No constipation  BREAST: Denies pain. No lumps. No discharge.  URINARY: No incontinence, no nocturia, no frequency and no dysuria.  CARDIOVASCULAR: No chest pain. No shortness of breath. No leg cramps.  NEUROLOGICAL: No headaches. No vision changes.    PHYSICAL EXAM:  VULVA: normal appearing vulva with no masses, tenderness or lesions   VAGINA: normal appearing vagina with normal color and + thin discharge, no lesions   CERVIX: normal appearing cervix without discharge or lesions   UTERUS: uterus is normal size, shape, consistency and nontender   ADNEXA: normal adnexa in size, nontender and no masses    ASSESSMENT and PLAN:    ICD-10-CM ICD-9-CM    1. Acute vaginitis N76.0 616.10 Vaginosis Screen by DNA Probe      C. trachomatis/N. gonorrhoeae by AMP DNA      metroNIDAZOLE (METROGEL VAGINAL) 0.75 % vaginal gel      clotrimazole-betamethasone 1-0.05% (LOTRISONE) cream     GCCT  Vaginosis cx  Metrogel  Lotrisone     Patient was counseled today on vaginitis prevention including :  a. avoiding feminine products such as deoderant soaps, body wash, bubble bath, douches, scented toilet paper, deoderant tampons or pads, feminine wipes, chronic pad use, etc.  b. avoiding other vulvovaginal irritants such as long hot baths, humidity, tight, synthetic clothing, chlorine and sitting around in  wet bathing suits  c. wearing cotton underwear, avoiding thong underwear and no underwear to bed  d. taking showers instead of baths and use a hair dryer on cool setting afterwards to dry  e. wearing cotton to exercise and shower immediately after exercise and change clothes  f. using polyurethane condoms without spermicide if sexually active and symptoms are triggered by intercourse    FOLLOW UP: PRN lack of improvement.    Shannan Luna, FNP-C

## 2019-08-29 ENCOUNTER — TELEPHONE (OUTPATIENT)
Dept: FAMILY MEDICINE | Facility: CLINIC | Age: 59
End: 2019-08-29

## 2019-08-29 LAB
BACTERIAL VAGINOSIS DNA: NEGATIVE
C TRACH DNA SPEC QL NAA+PROBE: NOT DETECTED
CANDIDA GLABRATA DNA: NEGATIVE
CANDIDA KRUSEI DNA: NEGATIVE
CANDIDA RRNA VAG QL PROBE: NEGATIVE
N GONORRHOEA DNA SPEC QL NAA+PROBE: NOT DETECTED
T VAGINALIS RRNA GENITAL QL PROBE: NEGATIVE

## 2019-08-29 NOTE — TELEPHONE ENCOUNTER
----- Message from Wrike Lauri sent at 8/29/2019  4:47 PM CDT -----  Contact: [phone]/self  Type:  Sooner Apoointment Request    Caller is requesting a sooner appointment.  Caller declined first available appointment listed below.  Caller will not accept being placed on the waitlist and is requesting a message be sent to doctor.  Name of Caller:PATIENT  When is the first available appointment? 9-5-2019  Symptoms:HIGH BLOOD PRESSURE  Would the patient rather a call back or a response via [facility]? CALLBACK  Best Call Back Number:[phone]  Additional Information: PATIENT WOULD LIKE TO BE SEEN ON TOMORROW

## 2019-08-29 NOTE — TELEPHONE ENCOUNTER
Returned call to patient, patient stated she has been elevated /72 yesterday at OV with another provider  Today /94 this am , 12 noon 156/92 4 pm155/92. Patient requested to be seen by anyone available, patient scheduled with Shelia as requested for OV tomorrow

## 2019-08-30 ENCOUNTER — LAB VISIT (OUTPATIENT)
Dept: LAB | Facility: HOSPITAL | Age: 59
End: 2019-08-30
Attending: FAMILY MEDICINE
Payer: COMMERCIAL

## 2019-08-30 ENCOUNTER — OFFICE VISIT (OUTPATIENT)
Dept: FAMILY MEDICINE | Facility: CLINIC | Age: 59
End: 2019-08-30
Payer: COMMERCIAL

## 2019-08-30 VITALS
HEART RATE: 90 BPM | DIASTOLIC BLOOD PRESSURE: 72 MMHG | RESPIRATION RATE: 17 BRPM | BODY MASS INDEX: 30.93 KG/M2 | OXYGEN SATURATION: 93 % | WEIGHT: 192.44 LBS | SYSTOLIC BLOOD PRESSURE: 130 MMHG | HEIGHT: 66 IN | TEMPERATURE: 98 F

## 2019-08-30 DIAGNOSIS — I10 HYPERTENSION, ESSENTIAL, BENIGN: Primary | ICD-10-CM

## 2019-08-30 DIAGNOSIS — E11.42 TYPE 2 DIABETES MELLITUS WITH DIABETIC POLYNEUROPATHY, WITH LONG-TERM CURRENT USE OF INSULIN: ICD-10-CM

## 2019-08-30 DIAGNOSIS — Z79.4 TYPE 2 DIABETES MELLITUS WITH DIABETIC POLYNEUROPATHY, WITH LONG-TERM CURRENT USE OF INSULIN: ICD-10-CM

## 2019-08-30 LAB
ESTIMATED AVG GLUCOSE: 212 MG/DL (ref 68–131)
HBA1C MFR BLD HPLC: 9 % (ref 4–5.6)

## 2019-08-30 PROCEDURE — 3008F BODY MASS INDEX DOCD: CPT | Mod: CPTII,S$GLB,, | Performed by: PHYSICIAN ASSISTANT

## 2019-08-30 PROCEDURE — 3075F SYST BP GE 130 - 139MM HG: CPT | Mod: CPTII,S$GLB,, | Performed by: PHYSICIAN ASSISTANT

## 2019-08-30 PROCEDURE — 99999 PR PBB SHADOW E&M-EST. PATIENT-LVL V: CPT | Mod: PBBFAC,,, | Performed by: PHYSICIAN ASSISTANT

## 2019-08-30 PROCEDURE — 99999 PR PBB SHADOW E&M-EST. PATIENT-LVL V: ICD-10-PCS | Mod: PBBFAC,,, | Performed by: PHYSICIAN ASSISTANT

## 2019-08-30 PROCEDURE — 3078F DIAST BP <80 MM HG: CPT | Mod: CPTII,S$GLB,, | Performed by: PHYSICIAN ASSISTANT

## 2019-08-30 PROCEDURE — 3045F PR MOST RECENT HEMOGLOBIN A1C LEVEL 7.0-9.0%: CPT | Mod: CPTII,S$GLB,, | Performed by: PHYSICIAN ASSISTANT

## 2019-08-30 PROCEDURE — 3078F PR MOST RECENT DIASTOLIC BLOOD PRESSURE < 80 MM HG: ICD-10-PCS | Mod: CPTII,S$GLB,, | Performed by: PHYSICIAN ASSISTANT

## 2019-08-30 PROCEDURE — 99214 PR OFFICE/OUTPT VISIT, EST, LEVL IV, 30-39 MIN: ICD-10-PCS | Mod: S$GLB,,, | Performed by: PHYSICIAN ASSISTANT

## 2019-08-30 PROCEDURE — 99214 OFFICE O/P EST MOD 30 MIN: CPT | Mod: S$GLB,,, | Performed by: PHYSICIAN ASSISTANT

## 2019-08-30 PROCEDURE — 3075F PR MOST RECENT SYSTOLIC BLOOD PRESS GE 130-139MM HG: ICD-10-PCS | Mod: CPTII,S$GLB,, | Performed by: PHYSICIAN ASSISTANT

## 2019-08-30 PROCEDURE — 3008F PR BODY MASS INDEX (BMI) DOCUMENTED: ICD-10-PCS | Mod: CPTII,S$GLB,, | Performed by: PHYSICIAN ASSISTANT

## 2019-08-30 PROCEDURE — 83036 HEMOGLOBIN GLYCOSYLATED A1C: CPT

## 2019-08-30 PROCEDURE — 36415 COLL VENOUS BLD VENIPUNCTURE: CPT | Mod: PO

## 2019-08-30 PROCEDURE — 3045F PR MOST RECENT HEMOGLOBIN A1C LEVEL 7.0-9.0%: ICD-10-PCS | Mod: CPTII,S$GLB,, | Performed by: PHYSICIAN ASSISTANT

## 2019-08-30 RX ORDER — AMLODIPINE BESYLATE 10 MG/1
10 TABLET ORAL DAILY
Qty: 90 TABLET | Refills: 0 | Status: SHIPPED | OUTPATIENT
Start: 2019-08-30 | End: 2019-09-14

## 2019-08-30 NOTE — PROGRESS NOTES
Subjective:       Patient ID: Cindy Billingsley is a 58 y.o. female.    Chief Complaint: Hypertension and Diabetic Foot Exam    Hypertension   Associated symptoms include headaches and shortness of breath. Pertinent negatives include no chest pain. Risk factors for coronary artery disease include family history, sedentary lifestyle, smoking/tobacco exposure and diabetes mellitus.   Pt states BP is fluctuating. Over past few days has been higher than normal. She has clonidine to take PRN systolic above 150. She states she has gained weight and has recently changes shifts at work. She gets headaches where her pressure is elevated. She states she does not work out or follow a healthy diet. She states she does get SOB on exertion. She had stress test and Echo last year with cardiology which was WNL.   Social History     Socioeconomic History    Marital status: Single     Spouse name: Not on file    Number of children: Not on file    Years of education: Not on file    Highest education level: Not on file   Occupational History    Occupation: RN     Employer: [employer]   Social Needs    Financial resource strain: Not on file    Food insecurity:     Worry: Not on file     Inability: Not on file    Transportation needs:     Medical: Not on file     Non-medical: Not on file   Tobacco Use    Smoking status: Former Smoker     Last attempt to quit: 1990     Years since quittin.6    Smokeless tobacco: Never Used    Tobacco comment: patient quit in    Substance and Sexual Activity    Alcohol use: Yes     Alcohol/week: 0.0 oz     Comment: Occasional    Drug use: No    Sexual activity: Not Currently     Birth control/protection: None, Rhythm   Lifestyle    Physical activity:     Days per week: Not on file     Minutes per session: Not on file    Stress: Not on file   Relationships    Social connections:     Talks on phone: Not on file     Gets together: Not on file     Attends Alevism service:  Not on file     Active member of club or organization: Not on file     Attends meetings of clubs or organizations: Not on file     Relationship status: Not on file   Other Topics Concern    Are you pregnant or think you may be? 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   Respiratory: Positive for shortness of breath.    Cardiovascular: Negative for chest pain and leg swelling.   Neurological: Positive for headaches.       Objective:      Physical Exam   Constitutional: She is oriented to person, place, and time. She appears well-developed and well-nourished.   HENT:   Head: Normocephalic and atraumatic.   Pulmonary/Chest: Effort normal.   Neurological: She is alert and oriented to person, place, and time.   Skin: Skin is warm and dry.   Psychiatric: She has a normal mood and affect. Her behavior is normal. Judgment and thought content normal.   Vitals reviewed.      Assessment:       1. Hypertension, essential, benign    2. Type 2 diabetes mellitus with diabetic polyneuropathy, with long-term current use of insulin        Plan:         Cindy was seen today for hypertension and diabetic foot exam.    Diagnoses and all orders for this visit:    Hypertension, essential, benign  -     Increase amLODIPine (NORVASC) 10 MG tablet; Take 1 tablet (10 mg total) by mouth once daily.  -     Stressed DASh diet and increase cardio activity, advised 150 minutes per week; f/u in 2 weeks.    Type 2 diabetes mellitus with diabetic polyneuropathy, with long-term current use of insulin       -   Labs due today

## 2019-09-04 ENCOUNTER — PATIENT MESSAGE (OUTPATIENT)
Dept: ENDOCRINOLOGY | Facility: CLINIC | Age: 59
End: 2019-09-04

## 2019-09-09 ENCOUNTER — PATIENT OUTREACH (OUTPATIENT)
Dept: OTHER | Facility: OTHER | Age: 59
End: 2019-09-09

## 2019-09-12 NOTE — PROGRESS NOTES
Digital Medicine: Clinician Follow-Up    She denies changes other than changing from night to day shift  She saw primary care 8/30  She has exercised every day this week  She has been taking BP in morning prior to medications and taking clonidine if SBP > 150  She has follow up PCP appointment 9/14        Follow Up  Follow-up reason(s): reading review    Previously tapered off metoprolol due to improved BP control          Sleep Apnea  Patient previously diagnosed with MONIQUE She reports she is not currently using CPAP.     Medication Affordability  Patient is currently not having problems affording medications    Medication Adherence:   She misses doses: never    She wonders if medications are working.  She knows purpose of medications.        INTERVENTION(S)  reviewed appropriate dose schedule, recommended physical activity and reviewed monitoring technique    PLAN  patient verbalizes understanding    Reviewed importance of lifestyle component on BP control  Reviewed to take BP measurements at least 45 min - 1 hour after BP medications and physical activity  Discussed that BP may be higher in morning and prior to medication doses but potential for low BP if taking clonidine and then other medications  Counseled on risk of rebound BP and HR if she takes clonidine consistently then abruptly stops it  If BP is truly elevated, consider changing amlodipine to nifedipine 60-90 mg or hctz to chlorthalidone 25 mg daily. Could consider restarting beta-blocker if BP remains elevated with above changes.      There are no preventive care reminders to display for this patient.    Last 5 Patient Entered Readings                                      Current 30 Day Average: 143/87     Recent Readings 9/12/2019 9/11/2019 9/10/2019 9/9/2019 9/7/2019    SBP (mmHg) 148 147 160 149 149    DBP (mmHg) 90 85 92 89 94    Pulse 96 94 85 77 88             Hypertension Medications             amLODIPine (NORVASC) 10 MG tablet Take 1 tablet (10  mg total) by mouth once daily.    cloNIDine (CATAPRES) 0.1 MG tablet One tab as needed for systolic BP over 150    hydroCHLOROthiazide (HYDRODIURIL) 25 MG tablet Take 1 tablet (25 mg total) by mouth once daily.    valsartan (DIOVAN) 320 MG tablet TAKE 1 TABLET BY MOUTH ONCE DAILY

## 2019-09-14 ENCOUNTER — OFFICE VISIT (OUTPATIENT)
Dept: FAMILY MEDICINE | Facility: CLINIC | Age: 59
End: 2019-09-14
Payer: COMMERCIAL

## 2019-09-14 VITALS
TEMPERATURE: 98 F | HEIGHT: 66 IN | BODY MASS INDEX: 30.51 KG/M2 | RESPIRATION RATE: 17 BRPM | OXYGEN SATURATION: 95 % | SYSTOLIC BLOOD PRESSURE: 138 MMHG | HEART RATE: 84 BPM | DIASTOLIC BLOOD PRESSURE: 80 MMHG | WEIGHT: 189.81 LBS

## 2019-09-14 DIAGNOSIS — Z79.4 TYPE 2 DIABETES MELLITUS WITH DIABETIC POLYNEUROPATHY, WITH LONG-TERM CURRENT USE OF INSULIN: Primary | ICD-10-CM

## 2019-09-14 DIAGNOSIS — E11.42 TYPE 2 DIABETES MELLITUS WITH DIABETIC POLYNEUROPATHY, WITH LONG-TERM CURRENT USE OF INSULIN: Primary | ICD-10-CM

## 2019-09-14 DIAGNOSIS — I10 ESSENTIAL HYPERTENSION: ICD-10-CM

## 2019-09-14 PROCEDURE — 3079F DIAST BP 80-89 MM HG: CPT | Mod: CPTII,S$GLB,, | Performed by: FAMILY MEDICINE

## 2019-09-14 PROCEDURE — 3008F PR BODY MASS INDEX (BMI) DOCUMENTED: ICD-10-PCS | Mod: CPTII,S$GLB,, | Performed by: FAMILY MEDICINE

## 2019-09-14 PROCEDURE — 3079F PR MOST RECENT DIASTOLIC BLOOD PRESSURE 80-89 MM HG: ICD-10-PCS | Mod: CPTII,S$GLB,, | Performed by: FAMILY MEDICINE

## 2019-09-14 PROCEDURE — 99214 OFFICE O/P EST MOD 30 MIN: CPT | Mod: S$GLB,,, | Performed by: FAMILY MEDICINE

## 2019-09-14 PROCEDURE — 3045F PR MOST RECENT HEMOGLOBIN A1C LEVEL 7.0-9.0%: ICD-10-PCS | Mod: CPTII,S$GLB,, | Performed by: FAMILY MEDICINE

## 2019-09-14 PROCEDURE — 99214 PR OFFICE/OUTPT VISIT, EST, LEVL IV, 30-39 MIN: ICD-10-PCS | Mod: S$GLB,,, | Performed by: FAMILY MEDICINE

## 2019-09-14 PROCEDURE — 3075F PR MOST RECENT SYSTOLIC BLOOD PRESS GE 130-139MM HG: ICD-10-PCS | Mod: CPTII,S$GLB,, | Performed by: FAMILY MEDICINE

## 2019-09-14 PROCEDURE — 3008F BODY MASS INDEX DOCD: CPT | Mod: CPTII,S$GLB,, | Performed by: FAMILY MEDICINE

## 2019-09-14 PROCEDURE — 3075F SYST BP GE 130 - 139MM HG: CPT | Mod: CPTII,S$GLB,, | Performed by: FAMILY MEDICINE

## 2019-09-14 PROCEDURE — 99999 PR PBB SHADOW E&M-EST. PATIENT-LVL III: ICD-10-PCS | Mod: PBBFAC,,, | Performed by: FAMILY MEDICINE

## 2019-09-14 PROCEDURE — 99999 PR PBB SHADOW E&M-EST. PATIENT-LVL III: CPT | Mod: PBBFAC,,, | Performed by: FAMILY MEDICINE

## 2019-09-14 PROCEDURE — 3045F PR MOST RECENT HEMOGLOBIN A1C LEVEL 7.0-9.0%: CPT | Mod: CPTII,S$GLB,, | Performed by: FAMILY MEDICINE

## 2019-09-14 RX ORDER — VALSARTAN 320 MG/1
320 TABLET ORAL DAILY
Qty: 90 TABLET | Refills: 0 | Status: SHIPPED | OUTPATIENT
Start: 2019-09-14 | End: 2019-12-02 | Stop reason: RX

## 2019-09-14 RX ORDER — NIFEDIPINE 30 MG/1
30 TABLET, EXTENDED RELEASE ORAL DAILY
Qty: 30 TABLET | Refills: 2 | Status: SHIPPED | OUTPATIENT
Start: 2019-09-14 | End: 2019-10-07

## 2019-09-14 NOTE — PROGRESS NOTES
Chief Complaint   Patient presents with    kidney funtion    Hypertension       Cindy Billingsley is a 58 y.o. female who presents per the Chief Complaint.  Pt is known to me and was last seen by me on 5/21/2019.  All known chronic medical issues have been documented.       Hypertension   This is a chronic problem. The current episode started more than 1 year ago. The problem has been waxing and waning since onset. The problem is uncontrolled. Associated symptoms include anxiety, blurred vision and headaches. Pertinent negatives include no chest pain, neck pain, palpitations, peripheral edema, shortness of breath or sweats. There are no associated agents to hypertension. Risk factors for coronary artery disease include diabetes mellitus, dyslipidemia and post-menopausal state. Past treatments include angiotensin blockers and diuretics. The current treatment provides moderate improvement. Compliance problems include psychosocial issues.  Hypertensive end-organ damage includes retinopathy. There is no history of angina, kidney disease, CAD/MI, CVA, heart failure, left ventricular hypertrophy or PVD. There is no history of chronic renal disease, coarctation of the aorta, hyperaldosteronism, hypercortisolism, hyperparathyroidism, a hypertension causing med, pheochromocytoma, renovascular disease, sleep apnea or a thyroid problem.   Diabetes   She presents for her follow-up diabetic visit. She has type 2 diabetes mellitus. Her disease course has been worsening. Hypoglycemia symptoms include headaches. Pertinent negatives for hypoglycemia include no confusion, dizziness, mood changes, nervousness/anxiousness, seizures, sleepiness, speech difficulty, sweats or tremors. Associated symptoms include blurred vision, foot paresthesias and visual change. Pertinent negatives for diabetes include no chest pain, no fatigue, no foot ulcerations, no polydipsia, no polyphagia, no polyuria, no weakness and no weight loss. There are no  hypoglycemic complications. Symptoms are worsening. Diabetic complications include peripheral neuropathy and retinopathy. Pertinent negatives for diabetic complications include no autonomic neuropathy, CVA, heart disease, impotence, nephropathy or PVD. Risk factors for coronary artery disease include diabetes mellitus, hypertension and stress. Current diabetic treatment includes insulin injections and oral agent (monotherapy). She is compliant with treatment most of the time. Her weight is stable. She is following a generally healthy diet. She has not had a previous visit with a dietitian. She participates in exercise intermittently. An ACE inhibitor/angiotensin II receptor blocker is being taken. She does not see a podiatrist.Eye exam is current.        ROS  Review of Systems   Constitutional: Negative.  Negative for activity change, appetite change, chills, diaphoresis, fatigue, fever, unexpected weight change and weight loss.   HENT: Negative.  Negative for congestion, ear pain, hearing loss, nosebleeds, postnasal drip, rhinorrhea, sinus pressure, sneezing, sore throat and trouble swallowing.    Eyes: Positive for blurred vision. Negative for pain and visual disturbance.   Respiratory: Negative for cough, choking and shortness of breath.    Cardiovascular: Negative for chest pain, palpitations and leg swelling.   Gastrointestinal: Negative for abdominal pain, constipation, diarrhea, nausea and vomiting.   Endocrine: Negative for polydipsia, polyphagia and polyuria.   Genitourinary: Negative for difficulty urinating, dysuria, frequency, impotence and urgency.   Musculoskeletal: Negative.  Negative for arthralgias, back pain, gait problem, joint swelling, myalgias and neck pain.   Skin: Negative.    Allergic/Immunologic: Negative for environmental allergies and food allergies.   Neurological: Positive for headaches. Negative for dizziness, tremors, seizures, syncope, speech difficulty, weakness and  "light-headedness.   Psychiatric/Behavioral: Positive for sleep disturbance. Negative for confusion, decreased concentration and dysphoric mood. The patient is not nervous/anxious.        Physical Exam  Vitals:    09/14/19 1113   BP: 138/80   Pulse: 84   Resp: 17   Temp: 98.2 °F (36.8 °C)    Body mass index is 30.64 kg/m².  Weight: 86.1 kg (189 lb 13.1 oz)   Height: 5' 6" (167.6 cm)     Physical Exam   Cardiovascular:   Pulses:       Dorsalis pedis pulses are 1+ on the right side, and 1+ on the left side.   Musculoskeletal:        Right foot: There is normal range of motion and no deformity.        Left foot: There is normal range of motion and no deformity.   Feet:   Right Foot:   Protective Sensation: 5 sites tested. 5 sites sensed.   Skin Integrity: Negative for ulcer, blister, skin breakdown, erythema, warmth, callus or dry skin.   Left Foot:   Protective Sensation: 5 sites tested. 5 sites sensed.   Skin Integrity: Negative for ulcer, blister, skin breakdown, erythema, warmth, callus or dry skin.       Assessment & Plan    Discussion of plan of care including treatment options regarding health and wellness were reviewed and discussed with patient.  Any changes to medication or treatment plan, as well as any screening blood test, imaging, or referrals to specialist, are documented.  Follow up as indicated.     1. Type 2 diabetes mellitus with diabetic polyneuropathy, with long-term current use of insulin  Patient is encouraged to follow a diet low in carbohydrates and simple sugars.  Discussed simple vs. complex carbohydrates as well as eating times of certain meals. Advised to focus on good food choices and increased physical activity and encouraged to adhere to medication regimen and/or lifestyle adjustments, and to check glucose level as recommended.  Contact office if glucose levels are not improving over time.  Screening blood test is due in 2 months.  Sensory exam of the foot is normal, tested with the " monofilament. Good pulses, no lesions or ulcers, good peripheral pulses.      - Hemoglobin A1c; Future  - Basic metabolic panel; Future    2. Essential hypertension  Patient was counseled and encouraged to maintain a low sodium diet, as well as increasing physical activity.  Recommend random BP checks at home on a regular basis.  Repeat BP at end of visit was not necessary. Will continue medication at this time, and follow up in 3-6 months, or sooner if blood pressure begins to increase.     - NIFEdipine (PROCARDIA-XL) 30 MG (OSM) 24 hr tablet; Take 1 tablet (30 mg total) by mouth once daily.  Dispense: 30 tablet; Refill: 2  - valsartan (DIOVAN) 320 MG tablet; Take 1 tablet (320 mg total) by mouth once daily.  Dispense: 90 tablet; Refill: 0  - Lipid panel; Future       Follow up in about 3 months (around 12/14/2019), or if symptoms worsen or fail to improve.        ACTIVE MEDICAL ISSUES:  Documented in Problem List    PAST MEDICAL HISTORY  Documented    PAST SURGICAL HISTORY:  Documented    SOCIAL HISTORY:  Documented    FAMILY HISTORY:  Documented    ALLERGIES AND MEDICATIONS: updated and reviewed.  Documented    Health Maintenance       Date Due Completion Date    Shingles Vaccine (1 of 2) 12/15/2009 ---    Foot Exam 06/26/2019 6/26/2018    Override on 6/26/2018: Done    Override on 6/12/2017: Done    Override on 6/26/2015: Done (today)    Influenza Vaccine (1) 09/01/2019 11/8/2018    Override on 10/30/2015: Done    Override on 6/26/2015: Declined (not at this present time)    Override on 10/16/2014: Done    Hemoglobin A1c 11/30/2019 8/30/2019    Override on 6/26/2015: Done (will do future ,  appoint 06/29/15)    Eye Exam 03/14/2020 3/14/2019    Override on 6/19/2017: Done (Dr Enrique)    Override on 12/16/2016: Done (Dr. Royal)    Override on 4/1/2016: Done (Dr Brown)    Override on 6/26/2015: Declined ( she will go until 08/2015)    Override on 6/24/2014: Declined (had one done)    Lipid  Panel 05/21/2020 5/21/2019    Override on 1/12/2015: Done (future)    TETANUS VACCINE 08/01/2020 8/1/2010    Low Dose Statin 08/30/2020 8/30/2019    Mammogram 06/18/2021 6/18/2019    Pap Smear with HPV Cotest 01/29/2022 1/29/2019    Colonoscopy 11/16/2027 11/16/2017

## 2019-09-16 DIAGNOSIS — F51.02 INSOMNIA DUE TO STRESS: ICD-10-CM

## 2019-09-23 DIAGNOSIS — F51.02 INSOMNIA DUE TO STRESS: ICD-10-CM

## 2019-09-30 RX ORDER — ZOLPIDEM TARTRATE 5 MG/1
5 TABLET ORAL NIGHTLY
Qty: 30 TABLET | Refills: 5 | Status: SHIPPED | OUTPATIENT
Start: 2019-09-30 | End: 2020-03-20 | Stop reason: SDUPTHER

## 2019-09-30 RX ORDER — ZOLPIDEM TARTRATE 5 MG/1
TABLET ORAL
Qty: 30 TABLET | Refills: 5 | OUTPATIENT
Start: 2019-09-30

## 2019-10-03 ENCOUNTER — PATIENT OUTREACH (OUTPATIENT)
Dept: OTHER | Facility: OTHER | Age: 59
End: 2019-10-03

## 2019-10-03 NOTE — PROGRESS NOTES
Digital Medicine: Clinician Follow-Up    Patient continues to use a ReliOn BP cuff but is not entering all of her readings  She frequently continues to take readings prior to medications.   She was seen by Dr. Lombard 9/14 and amlodipine 10 mg to nifedipine 30 mg daily  Reports she noticed some MCKAYLA today. Reports MCKAYLA with past use of amlodipine 10 mg daily.    The history is provided by the patient.     Follow Up  Follow-up reason(s): reading review              Sleep Apnea  Patient previously diagnosed with MONIQUE She reports she is not currently using CPAP.     Medication Affordability  Patient is currently not having problems affording medications    Medication Adherence:     Patient is not selectively taking diuretics.    She does not wonder if medications are working.  She knows purpose of medications.        INTERVENTION(S)  reviewed appropriate dose schedule, reviewed monitoring technique and encouragement/support    PLAN  patient verbalizes understanding    Reminded patient to take BP readings at least 45 minutes - 1 hour after BP medications and enter all readings for me to review  Monitor MCKAYLA. Consider increasing nifedipine. Alternatively, could consider changing hctz to chlorthalidone.      There are no preventive care reminders to display for this patient.    Last 5 Patient Entered Readings                                      Current 30 Day Average: 144/87     Recent Readings 9/27/2019 9/21/2019 9/20/2019 9/18/2019 9/12/2019    SBP (mmHg) 124 139 146 161 126    DBP (mmHg) 79 94 86 86 82    Pulse 88 78 94 103 83             Hypertension Medications             cloNIDine (CATAPRES) 0.1 MG tablet One tab as needed for systolic BP over 150    hydroCHLOROthiazide (HYDRODIURIL) 25 MG tablet Take 1 tablet (25 mg total) by mouth once daily.    NIFEdipine (PROCARDIA-XL) 30 MG (OSM) 24 hr tablet Take 1 tablet (30 mg total) by mouth once daily.    valsartan (DIOVAN) 320 MG tablet Take 1 tablet (320 mg total) by mouth  once daily.

## 2019-10-07 ENCOUNTER — PATIENT OUTREACH (OUTPATIENT)
Dept: OTHER | Facility: OTHER | Age: 59
End: 2019-10-07

## 2019-10-07 DIAGNOSIS — I10 ESSENTIAL HYPERTENSION: ICD-10-CM

## 2019-10-07 RX ORDER — NIFEDIPINE 60 MG/1
60 TABLET, EXTENDED RELEASE ORAL DAILY
Qty: 30 TABLET | Refills: 2 | Status: SHIPPED | OUTPATIENT
Start: 2019-10-07 | End: 2019-10-31 | Stop reason: SDUPTHER

## 2019-10-07 NOTE — PROGRESS NOTES
Digital Medicine: Clinician Follow-Up    Patient reports she has taken BP measurements at least an hour after medications since outreach 10/4  She denies further MCKAYLA concerns    The history is provided by the patient.     Follow Up  Follow-up reason(s): reading review and medication change      Medication Change: dose increase            Sleep Apnea  Patient previously diagnosed with MONIQUE She reports she is not currently using CPAP.     Medication Affordability  Patient is currently not having problems affording medications    Medication Adherence:     Patient is not selectively taking diuretics.    She does not wonder if medications are working.  She knows purpose of medications.        INTERVENTION(S)  recommended med change    PLAN  patient verbalizes understanding and patient amenable to changes    Increase nifedipine to 60 mg daily  Discussed changing hctz to chlorthalidone if needed for further BP lowering and if nifedipine dose limited by MCKAYLA      There are no preventive care reminders to display for this patient.    Last 5 Patient Entered Readings                                      Current 30 Day Average: 146/88     Recent Readings 10/4/2019 10/4/2019 9/27/2019 9/21/2019 9/20/2019    SBP (mmHg) 155 157 124 139 146    DBP (mmHg) 91 88 79 94 86    Pulse 92 86 88 78 94             Hypertension Medications             cloNIDine (CATAPRES) 0.1 MG tablet One tab as needed for systolic BP over 150    hydroCHLOROthiazide (HYDRODIURIL) 25 MG tablet Take 1 tablet (25 mg total) by mouth once daily.    NIFEdipine (PROCARDIA-XL) 60 MG (OSM) 24 hr tablet Take 1 tablet (60 mg total) by mouth once daily. (dose increase)    valsartan (DIOVAN) 320 MG tablet Take 1 tablet (320 mg total) by mouth once daily.

## 2019-10-09 RX ORDER — HYDROCHLOROTHIAZIDE 25 MG/1
25 TABLET ORAL DAILY
Qty: 90 TABLET | Refills: 3 | Status: SHIPPED | OUTPATIENT
Start: 2019-10-09 | End: 2019-10-21 | Stop reason: ALTCHOICE

## 2019-10-14 NOTE — PROGRESS NOTES
Digital Medicine: Health  Follow-Up    The history is provided by the patient.       Assessment/Plan    SDOH    Intervention/Plan    There are no preventive care reminders to display for this patient.    Last 5 Patient Entered Readings                                      Current 30 Day Average: 145/87     Recent Readings 10/4/2019 10/4/2019 9/27/2019 9/21/2019 9/20/2019    SBP (mmHg) 155 157 124 139 146    DBP (mmHg) 91 88 79 94 86    Pulse 92 86 88 78 94

## 2019-10-14 NOTE — PROGRESS NOTES
Digital Medicine: Health  Follow-Up    Patient returned call stating she has been manually inputting her BP readings into [employer] indu.  Reports she got a new phone recently.  Informed patient her connection between [employer] and [employer] could be disconnected.  Provided tech team's number to patient.    The history is provided by the patient.     Follow Up  Follow-up reason(s): reading review      Readings are missing.   tech support needed.Reports she has been having BP readings in the 150s and has been having constant headaches.          Physical Activity:   When asked if exercising, patient responded: yes3 day(s) a week.      Medication Adherence:       States she started increase of nifedipine last week.  Will route note to pharmacist.      SDOH    INTERVENTION(S)  reviewed monitoring technique and encouragement/support    PLAN  Clinician follow-up      There are no preventive care reminders to display for this patient.    Last 5 Patient Entered Readings                                      Current 30 Day Average: 145/87     Recent Readings 10/4/2019 10/4/2019 9/27/2019 9/21/2019 9/20/2019    SBP (mmHg) 155 157 124 139 146    DBP (mmHg) 91 88 79 94 86    Pulse 92 86 88 78 94

## 2019-10-17 ENCOUNTER — PATIENT OUTREACH (OUTPATIENT)
Dept: OTHER | Facility: OTHER | Age: 59
End: 2019-10-17

## 2019-10-17 NOTE — PROGRESS NOTES
10/17: Patient called and LVM after hours stating she has been trying to reach Technical Support to have readings transfer.  Returned call and informed patient I will place task for tech team.  Will call in 1 week.

## 2019-10-21 ENCOUNTER — PATIENT OUTREACH (OUTPATIENT)
Dept: OTHER | Facility: OTHER | Age: 59
End: 2019-10-21

## 2019-10-21 DIAGNOSIS — I10 ESSENTIAL HYPERTENSION: Primary | ICD-10-CM

## 2019-10-21 RX ORDER — CHLORTHALIDONE 25 MG/1
25 TABLET ORAL DAILY
Qty: 30 TABLET | Refills: 5 | Status: SHIPPED | OUTPATIENT
Start: 2019-10-21 | End: 2020-04-20

## 2019-10-21 NOTE — PROGRESS NOTES
Digital Medicine: Clinician Follow-Up    Patient unable to explain lower BP 10/19  Regarding MCKAYLA, reports legs as tight and shiny sometimes but no noticeable feet swelling  Confirms she is taking BP measurement after medications    The history is provided by the patient.     Follow Up  Follow-up reason(s): reading review, medication change and medication change follow-up      Medication Change: alternative therapy    Patient started new medication.    Date:  10/7/2019  Is patient tolerating med change?:  Yes          Sleep Apnea  Patient previously diagnosed with MONIQUE She reports she is not currently using CPAP.     Medication Affordability  Patient is currently not having problems affording medications    Medication Adherence:     She does not wonder if medications are working.  She knows purpose of medications.        INTERVENTION(S)  reviewed appropriate dose schedule and recommended med change    PLAN  patient verbalizes understanding and patient amenable to changes    BP remains elevated despite nifedipine dose increase  Change hctz to chlorthalidone 25 mg daily. Repeat BMP next week with other appointment.  Appears patient is tolerating nifedipine, still have room to increase dose to 90 mg daily if needed.      There are no preventive care reminders to display for this patient.    Last 5 Patient Entered Readings                                      Current 30 Day Average: 152/89     Recent Readings 10/21/2019 10/20/2019 10/20/2019 10/19/2019 10/19/2019    SBP (mmHg) 157 156 157 127 154    DBP (mmHg) 86 90 92 83 83    Pulse 81 86 91 90 93             Hypertension Medications             cloNIDine (CATAPRES) 0.1 MG tablet One tab as needed for systolic BP over 150    hydroCHLOROthiazide (HYDRODIURIL) 25 MG tablet Take 1 tablet (25 mg total) by mouth once daily.    NIFEdipine (PROCARDIA-XL) 60 MG (OSM) 24 hr tablet Take 1 tablet (60 mg total) by mouth once daily. (dose increase)    valsartan (DIOVAN) 320 MG tablet  Take 1 tablet (320 mg total) by mouth once daily.

## 2019-10-24 ENCOUNTER — PATIENT OUTREACH (OUTPATIENT)
Dept: ADMINISTRATIVE | Facility: OTHER | Age: 59
End: 2019-10-24

## 2019-10-28 ENCOUNTER — OFFICE VISIT (OUTPATIENT)
Dept: ENDOCRINOLOGY | Facility: CLINIC | Age: 59
End: 2019-10-28
Payer: COMMERCIAL

## 2019-10-28 ENCOUNTER — LAB VISIT (OUTPATIENT)
Dept: LAB | Facility: HOSPITAL | Age: 59
End: 2019-10-28
Payer: COMMERCIAL

## 2019-10-28 ENCOUNTER — PATIENT OUTREACH (OUTPATIENT)
Dept: OTHER | Facility: OTHER | Age: 59
End: 2019-10-28

## 2019-10-28 VITALS
DIASTOLIC BLOOD PRESSURE: 91 MMHG | WEIGHT: 194.31 LBS | SYSTOLIC BLOOD PRESSURE: 141 MMHG | BODY MASS INDEX: 31.36 KG/M2 | HEART RATE: 108 BPM

## 2019-10-28 DIAGNOSIS — I10 ESSENTIAL HYPERTENSION: ICD-10-CM

## 2019-10-28 DIAGNOSIS — E78.2 MIXED HYPERLIPIDEMIA: ICD-10-CM

## 2019-10-28 DIAGNOSIS — R80.9 MICROALBUMINURIA: ICD-10-CM

## 2019-10-28 DIAGNOSIS — Z79.4 TYPE 2 DIABETES MELLITUS WITH DIABETIC POLYNEUROPATHY, WITH LONG-TERM CURRENT USE OF INSULIN: Primary | ICD-10-CM

## 2019-10-28 DIAGNOSIS — E11.42 TYPE 2 DIABETES MELLITUS WITH DIABETIC POLYNEUROPATHY, WITH LONG-TERM CURRENT USE OF INSULIN: Primary | ICD-10-CM

## 2019-10-28 LAB
ANION GAP SERPL CALC-SCNC: 9 MMOL/L (ref 8–16)
BUN SERPL-MCNC: 18 MG/DL (ref 6–20)
CALCIUM SERPL-MCNC: 9.7 MG/DL (ref 8.7–10.5)
CHLORIDE SERPL-SCNC: 105 MMOL/L (ref 95–110)
CO2 SERPL-SCNC: 26 MMOL/L (ref 23–29)
CREAT SERPL-MCNC: 0.9 MG/DL (ref 0.5–1.4)
EST. GFR  (AFRICAN AMERICAN): >60 ML/MIN/1.73 M^2
EST. GFR  (NON AFRICAN AMERICAN): >60 ML/MIN/1.73 M^2
GLUCOSE SERPL-MCNC: 202 MG/DL (ref 70–110)
POTASSIUM SERPL-SCNC: 4.3 MMOL/L (ref 3.5–5.1)
SODIUM SERPL-SCNC: 140 MMOL/L (ref 136–145)

## 2019-10-28 PROCEDURE — 99999 PR PBB SHADOW E&M-EST. PATIENT-LVL IV: CPT | Mod: PBBFAC,,, | Performed by: NURSE PRACTITIONER

## 2019-10-28 PROCEDURE — 99999 PR PBB SHADOW E&M-EST. PATIENT-LVL IV: ICD-10-PCS | Mod: PBBFAC,,, | Performed by: NURSE PRACTITIONER

## 2019-10-28 PROCEDURE — 3077F PR MOST RECENT SYSTOLIC BLOOD PRESSURE >= 140 MM HG: ICD-10-PCS | Mod: CPTII,S$GLB,, | Performed by: NURSE PRACTITIONER

## 2019-10-28 PROCEDURE — 99214 PR OFFICE/OUTPT VISIT, EST, LEVL IV, 30-39 MIN: ICD-10-PCS | Mod: S$GLB,,, | Performed by: NURSE PRACTITIONER

## 2019-10-28 PROCEDURE — 3008F BODY MASS INDEX DOCD: CPT | Mod: CPTII,S$GLB,, | Performed by: NURSE PRACTITIONER

## 2019-10-28 PROCEDURE — 3080F DIAST BP >= 90 MM HG: CPT | Mod: CPTII,S$GLB,, | Performed by: NURSE PRACTITIONER

## 2019-10-28 PROCEDURE — 80048 BASIC METABOLIC PNL TOTAL CA: CPT

## 2019-10-28 PROCEDURE — 36415 COLL VENOUS BLD VENIPUNCTURE: CPT | Mod: PN

## 2019-10-28 PROCEDURE — 3077F SYST BP >= 140 MM HG: CPT | Mod: CPTII,S$GLB,, | Performed by: NURSE PRACTITIONER

## 2019-10-28 PROCEDURE — 3008F PR BODY MASS INDEX (BMI) DOCUMENTED: ICD-10-PCS | Mod: CPTII,S$GLB,, | Performed by: NURSE PRACTITIONER

## 2019-10-28 PROCEDURE — 3080F PR MOST RECENT DIASTOLIC BLOOD PRESSURE >= 90 MM HG: ICD-10-PCS | Mod: CPTII,S$GLB,, | Performed by: NURSE PRACTITIONER

## 2019-10-28 PROCEDURE — 99214 OFFICE O/P EST MOD 30 MIN: CPT | Mod: S$GLB,,, | Performed by: NURSE PRACTITIONER

## 2019-10-28 NOTE — PATIENT INSTRUCTIONS
Recommend adhesive products to help Freestyle sensors adhere - skin tac and Simpatch.   Increase Humalog to 12 units before meals.   a1c at earliest convenience.   Test blood sugar 4x/day before meal and bedtime and send in log in 2 weeks for further advice.   Return to clinic in 3 months with labs prior.

## 2019-10-28 NOTE — PROGRESS NOTES
CC: This 58 y.o. Black or  female  is here for evaluation of  T2DM along with comorbidities indicated in the Visit Diagnosis section of this encounter.    HPI: Cindy Billingsley was diagnosed with T2DM in 1996. Prior to that had gestational diabetes with her first child at the age of 28. She started treatment on Glucophage - eventually Glucotrol was added. Lantus insulin was started ~ 2006 and she started on Novolog in March of 2012. In 3/15, A1c of 9.7% trended down to 7.4% with addition of Bydureon.     Initial visit 6/21/19  Last seen by ISHMAEL Barrett NP in 6/2018. New to me.   She has MONIQUE and will  her cpap machine next week. Has never used it before.   Reports diarrhea on metformin xr 1500 mg/day. Had no diarrhea on prior dose of metformin 1000 mg once daily.   Her BGs have been worse for months and she does not know why since diet is not that different.   She has gained about 9 lb however over the last year.   She saw Dr. Myrick in Bariatrics last year and was started on diethylpropion with some weight loss but she had to stop d/t palpitations.   Plan Due to diarrhea and difficulty with BID dosing, stop metformin xr 750 mg tablets. . Switch to metformin  mg tablets and take 2 tablets once daily (=1000 mg once daily).   Stop Victoza and switch to Ozempic 1 mg once weekly. May need a prior authorization.   Improve adherence with Humalog 10 units before meals with same correction scale - Use on premeal readings: 150-200=+2, 201-250=+4; 251-300=+6; 301-350=+8, over 350=+10 units  Increase Toujeo to 58 units every morning.   Start exercise routine.   See Diabetes Educator/Registered Dietician for Medical Nutrition Therapy.   Monitor blood sugars before meals and bedtime. rx for Freestyle Malka sent.   Return to clinic in 2 months with labs prior.       Interval history  Latest A1c up to 9 %. States her weight fluctuates between 186 to 190 lb at home.   She is enrolled in digital HTN program.  C/o headaches daily.   She has changed job position to day shift as a .   She tried using the Freestyle Malka but the sensors keep falling off.   Patient unable to start Ozempic because it's not covered.   Tolerating metformin xr 1000 mg/day much better without any nausea now.   States she is trying to eat healthy but admits meals at night cause FBGs to be high in the 200s.       LAST DIABETES EDUCATION: 2012; 6/24/19    HOSPITALIZED FOR DIABETES -  No.    PRESCRIBED DIABETES MEDICATIONS: Metformin xr 1000 mg once daily, Toujeo 58 units AM, Trulicity 1.5 mg weekly, Humalog 10 units before meals with correction scale: 150-200=+2, 201-250=+4; 251-300=+6; 301-350=+8, over 350=+10 units      Misses medication doses - no     DM COMPLICATIONS: nephropathy    SIGNIFICANT DIABETES MED HISTORY: metformin as above.     SELF MONITORING BLOOD GLUCOSE: Checks blood glucose at home 3x/day. She uses Relion meter. Review of glucometer and Freestyle Malka - BGs fluctuates between 90-250s.       HYPOGLYCEMIC EPISODES: very infrequent; symptoms start at BG < 100.      CURRENT DIET: drinks water. Eats 3 meals/day now.     CURRENT EXERCISE: walks 30 minutes 3 days a week; does this after work.     OCCUPATION: as above       BP (!) 141/91 (BP Location: Right arm, Patient Position: Sitting, BP Method: Large (Automatic)) Comment: pt states bp has been lifke this for the past 2 months  Pulse 108   Wt 88.1 kg (194 lb 4.8 oz)   LMP 03/03/2016 (LMP Unknown)   BMI 31.36 kg/m²     ROS:   CONSTITUTIONAL: Appetite good, denies fatigue  GI: No nausea, vomiting, no diarrhea   OTHER: + headache         PHYSICAL EXAM:  GENERAL: Well developed, well nourished. No acute distress.   PSYCH: AAOx3, appropriate mood and affect, conversant, well-groomed. Judgement and insight good.   NEURO: Cranial nerves grossly intact. Speech clear, no tremor.   CHEST: Respirations even and unlabored.        Hemoglobin A1C   Date Value Ref Range Status    08/30/2019 9.0 (H) 4.0 - 5.6 % Final     Comment:     ADA Screening Guidelines:  5.7-6.4%  Consistent with prediabetes  >or=6.5%  Consistent with diabetes  High levels of fetal hemoglobin interfere with the HbA1C  assay. Heterozygous hemoglobin variants (HbS, HgC, etc)do  not significantly interfere with this assay.   However, presence of multiple variants may affect accuracy.     05/21/2019 8.3 (H) 4.0 - 5.6 % Final     Comment:     ADA Screening Guidelines:  5.7-6.4%  Consistent with prediabetes  >or=6.5%  Consistent with diabetes  High levels of fetal hemoglobin interfere with the HbA1C  assay. Heterozygous hemoglobin variants (HbS, HgC, etc)do  not significantly interfere with this assay.   However, presence of multiple variants may affect accuracy.     02/12/2019 8.2 (H) 4.0 - 5.6 % Final     Comment:     ADA Screening Guidelines:  5.7-6.4%  Consistent with prediabetes  >or=6.5%  Consistent with diabetes  High levels of fetal hemoglobin interfere with the HbA1C  assay. Heterozygous hemoglobin variants (HbS, HgC, etc)do  not significantly interfere with this assay.   However, presence of multiple variants may affect accuracy.             Chemistry        Component Value Date/Time     05/21/2019 1130    K 4.0 05/21/2019 1130     05/21/2019 1130    CO2 27 05/21/2019 1130    BUN 14 05/21/2019 1130    CREATININE 0.8 05/21/2019 1130     (H) 05/21/2019 1130        Component Value Date/Time    CALCIUM 10.1 05/21/2019 1130    ALKPHOS 131 09/24/2018 1258    AST 20 09/24/2018 1258    ALT 19 09/24/2018 1258    BILITOT 0.5 09/24/2018 1258    ESTGFRAFRICA >60.0 05/21/2019 1130    EGFRNONAA >60.0 05/21/2019 1130          Lab Results   Component Value Date    LDLCALC 70.6 05/21/2019        Ref. Range 5/21/2019 11:30   Cholesterol Latest Ref Range: 120 - 199 mg/dL 143   HDL Latest Ref Range: 40 - 75 mg/dL 38 (L)   Hdl/Cholesterol Ratio Latest Ref Range: 20.0 - 50.0 % 26.6   LDL Cholesterol External Latest  Ref Range: 63.0 - 159.0 mg/dL 70.6   Non-HDL Cholesterol Latest Units: mg/dL 105   Total Cholesterol/HDL Ratio Latest Ref Range: 2.0 - 5.0  3.8   Triglycerides Latest Ref Range: 30 - 150 mg/dL 172 (H)     Lab Results   Component Value Date    MICALBCREAT 77.6 (H) 08/30/2019             ASSESSMENT and PLAN:    A1C GOAL: < 7 %     1. Type 2 diabetes mellitus with diabetic polyneuropathy, with long-term current use of insulin  Recommend adhesive products to help Freestyle sensors adhere - skin tac and Simpatch.   Increase Humalog to 12 units before meals.   a1c at earliest convenience.   Test blood sugar 4x/day before meal and bedtime and send in log in 2 weeks for further advice.     Increase exercise frequency.   May only need 10 units before meals if diet improves.   Return to clinic in 3 months with labs prior.       Hemoglobin A1c   2. Microalbuminuria  Microalbumin/creatinine urine ratio  Continue valsartan    3. Essential hypertension  Managed by Digital HTN program.    4. Mixed hyperlipidemia  Continue atorvastatin.          Orders Placed This Encounter   Procedures    Hemoglobin A1c     Standing Status:   Standing     Number of Occurrences:   2     Standing Expiration Date:   12/26/2020    Microalbumin/creatinine urine ratio     Standing Status:   Future     Standing Expiration Date:   12/26/2020     Order Specific Question:   Specimen Source     Answer:   Urine        Follow up in about 3 months (around 1/28/2020).

## 2019-10-28 NOTE — PROGRESS NOTES
Left voicemail requesting call back  BMP WNL except glucose. Patient seen by endo today  Discuss increasing nifedipine vs. Adding on beta-blocker or spironolactone

## 2019-10-30 ENCOUNTER — OFFICE VISIT (OUTPATIENT)
Dept: FAMILY MEDICINE | Facility: CLINIC | Age: 59
End: 2019-10-30
Payer: COMMERCIAL

## 2019-10-30 VITALS
HEIGHT: 66 IN | SYSTOLIC BLOOD PRESSURE: 126 MMHG | DIASTOLIC BLOOD PRESSURE: 68 MMHG | HEART RATE: 114 BPM | RESPIRATION RATE: 16 BRPM | WEIGHT: 190.94 LBS | BODY MASS INDEX: 30.69 KG/M2 | TEMPERATURE: 98 F | OXYGEN SATURATION: 94 %

## 2019-10-30 DIAGNOSIS — J06.9 ACUTE URI: Primary | ICD-10-CM

## 2019-10-30 PROCEDURE — 3074F SYST BP LT 130 MM HG: CPT | Mod: CPTII,S$GLB,, | Performed by: FAMILY MEDICINE

## 2019-10-30 PROCEDURE — 3074F PR MOST RECENT SYSTOLIC BLOOD PRESSURE < 130 MM HG: ICD-10-PCS | Mod: CPTII,S$GLB,, | Performed by: FAMILY MEDICINE

## 2019-10-30 PROCEDURE — 3008F BODY MASS INDEX DOCD: CPT | Mod: CPTII,S$GLB,, | Performed by: FAMILY MEDICINE

## 2019-10-30 PROCEDURE — 99999 PR PBB SHADOW E&M-EST. PATIENT-LVL IV: CPT | Mod: PBBFAC,,, | Performed by: FAMILY MEDICINE

## 2019-10-30 PROCEDURE — 99214 OFFICE O/P EST MOD 30 MIN: CPT | Mod: S$GLB,,, | Performed by: FAMILY MEDICINE

## 2019-10-30 PROCEDURE — 3078F DIAST BP <80 MM HG: CPT | Mod: CPTII,S$GLB,, | Performed by: FAMILY MEDICINE

## 2019-10-30 PROCEDURE — 3008F PR BODY MASS INDEX (BMI) DOCUMENTED: ICD-10-PCS | Mod: CPTII,S$GLB,, | Performed by: FAMILY MEDICINE

## 2019-10-30 PROCEDURE — 99999 PR PBB SHADOW E&M-EST. PATIENT-LVL IV: ICD-10-PCS | Mod: PBBFAC,,, | Performed by: FAMILY MEDICINE

## 2019-10-30 PROCEDURE — 99214 PR OFFICE/OUTPT VISIT, EST, LEVL IV, 30-39 MIN: ICD-10-PCS | Mod: S$GLB,,, | Performed by: FAMILY MEDICINE

## 2019-10-30 PROCEDURE — 3078F PR MOST RECENT DIASTOLIC BLOOD PRESSURE < 80 MM HG: ICD-10-PCS | Mod: CPTII,S$GLB,, | Performed by: FAMILY MEDICINE

## 2019-10-30 RX ORDER — FLASH GLUCOSE SENSOR
KIT MISCELLANEOUS
Refills: 5 | COMMUNITY
Start: 2019-10-05

## 2019-10-30 RX ORDER — PROMETHAZINE HYDROCHLORIDE AND DEXTROMETHORPHAN HYDROBROMIDE 6.25; 15 MG/5ML; MG/5ML
5 SYRUP ORAL NIGHTLY PRN
Qty: 180 ML | Refills: 0 | Status: SHIPPED | OUTPATIENT
Start: 2019-10-30 | End: 2019-11-09

## 2019-10-30 NOTE — PROGRESS NOTES
Subjective:       Patient ID: Cindy Billingsley is a 58 y.o. female.    Chief Complaint: Cough; Nasal Congestion; Sore Throat; Headache; and Generalized Body Aches      HPI  58-year-old female presents for sore throat.  States she felt a tickle in her throat about 4 days ago.  Endorses congestion rhinorrhea, body aches, fatigue, headaches, and congestion over last 2 days.  States she has multiple people at work that her sick.  Had fever last night.  Received flu vaccine already.      Review of Systems   Constitutional: Positive for chills and fever.   HENT: Positive for congestion, postnasal drip and rhinorrhea.    Respiratory: Positive for cough.    Cardiovascular: Negative.    Gastrointestinal: Negative.    Endocrine: Negative.    Genitourinary: Negative.    Musculoskeletal: Negative.    Neurological: Negative.    Psychiatric/Behavioral: Negative.           Past Medical History:   Diagnosis Date    Anxiety     Arthritis     Depression     Diabetes mellitus type II     Diabetic peripheral neuropathy 9/10/2012    Facial spasm - right side 9/10/2012    History of irregular menstrual bleeding     Hypertension     Knee pain      Past Surgical History:   Procedure Laterality Date    BREAST BIOPSY      breast reduction      COLONOSCOPY N/A 11/16/2017    Procedure: COLONOSCOPY;  Surgeon: Alan Acosta MD;  Location: [facility];  Service: Endoscopy;  Laterality: N/A;  confirmed appt    TOTAL REDUCTION MAMMOPLASTY      TUBAL LIGATION       Family History   Problem Relation Age of Onset    Heart disease Mother     Hypertension Mother     Diabetes Father     Breast cancer Neg Hx     Colon cancer Neg Hx     Stroke Neg Hx     Ovarian cancer Neg Hx     Melanoma Neg Hx     Cervical cancer Neg Hx     Endometrial cancer Neg Hx     Vaginal cancer Neg Hx      Social History     Socioeconomic History    Marital status: Single     Spouse name: Not on file    Number of children: Not on file    Years of  education: Not on file    Highest education level: Not on file   Occupational History    Occupation: RN     Employer: [employer]   Social Needs    Financial resource strain: Not on file    Food insecurity:     Worry: Not on file     Inability: Not on file    Transportation needs:     Medical: Not on file     Non-medical: Not on file   Tobacco Use    Smoking status: Former Smoker     Last attempt to quit: 1990     Years since quittin.8    Smokeless tobacco: Never Used    Tobacco comment: patient quit in    Substance and Sexual Activity    Alcohol use: Yes     Alcohol/week: 0.0 standard drinks     Comment: Occasional    Drug use: No    Sexual activity: Not Currently     Birth control/protection: None, Rhythm   Lifestyle    Physical activity:     Days per week: Not on file     Minutes per session: Not on file    Stress: Not on file   Relationships    Social connections:     Talks on phone: Not on file     Gets together: Not on file     Attends Taoist service: Not on file     Active member of club or organization: Not on file     Attends meetings of clubs or organizations: Not on file     Relationship status: Not on file   Other Topics Concern    Are you pregnant or think you may be? No    Breast-feeding No   Social History Narrative    Works night shift 7p - 7a at [employer] 3 x week; enrolled in [employer] school, single, 3 adult children       Current Outpatient Medications:     aspirin (ADULT ASPIRIN EC LOW STRENGTH) 81 MG EC tablet, Take 81 mg by mouth. 1 Tablet, Delayed Release (E.C.) Oral Every day, Disp: , Rfl:     atorvastatin (LIPITOR) 40 MG tablet, Take 1 tablet (40 mg total) by mouth every evening., Disp: 90 tablet, Rfl: 3    blood sugar diagnostic (BLOOD GLUCOSE TEST) Strp, One touch (Patient taking differently: 3 (three) times daily. One touch), Disp: 100 strip, Rfl: 11    chlorthalidone (HYGROTEN) 25 MG Tab, Take 1 tablet (25 mg total) by mouth once daily.  "(replaces hydrochlorothiazide for BP), Disp: 30 tablet, Rfl: 5    clonazePAM (KLONOPIN) 0.5 MG tablet, Take 1 tablet (0.5 mg total) by mouth 2 (two) times daily as needed for Anxiety., Disp: 60 tablet, Rfl: 2    diabetic supplies, miscellan. Kit, Please change sensor every 14 days. FreeStyle Malka Sensor 28-day supply (2 sensors/month), Disp: 2 kit, Rfl: 5    diclofenac sodium (VOLTAREN) 1 % Gel, Apply 2 g topically 2 (two) times daily., Disp: 200 g, Rfl: 5    dulaglutide (TRULICITY) 1.5 mg/0.5 mL PnIj, Inject 1.5 mg into the skin every 7 days., Disp: 12 Syringe, Rfl: 1    FREESTYLE MALKA 14 DAY SENSOR Kit, PLEASE CHANGE SENSOR EVERY 14 DAYS., Disp: , Rfl: 5    insulin glargine, TOUJEO, (TOUJEO SOLOSTAR U-300 INSULIN) 300 unit/mL (1.5 mL) InPn pen, INJECT 58 UNITS SUBCUTANEOUSLY ONCE DAILY, Disp: 12 Syringe, Rfl: 1    insulin lispro (HUMALOG KWIKPEN INSULIN) 100 unit/mL InPn pen, Inject each meal plus scale 150-200 +2, 201-250 +4, 251-300 +6, 301-350 +8, >350 +10., Disp: 3 Box, Rfl: 2    insulin syringe-needle U-100 1/2 mL 30 x 5/16" Syrg, , Disp: , Rfl:     lancets (MICROLET LANCET) Misc, Inject 1 each into the skin 3 (three) times daily., Disp: 100 each, Rfl: 11    metFORMIN (GLUCOPHAGE-XR) 500 MG 24 hr tablet, Take 2 tablets (1,000 mg total) by mouth once daily., Disp: 180 tablet, Rfl: 2    multivitamin (MULTIVITAMIN) per tablet, Take 1 tablet by mouth once daily.  , Disp: , Rfl:     NIFEdipine (PROCARDIA-XL) 60 MG (OSM) 24 hr tablet, Take 1 tablet (60 mg total) by mouth once daily. (dose increase), Disp: 30 tablet, Rfl: 2    pen needle, diabetic (PEN NEEDLE) 31 gauge x 5/16" Ndle, USE ONE  SUBCUTANEOUSLY 4 TIMES DAILY, Disp: 150 each, Rfl: 6    valsartan (DIOVAN) 320 MG tablet, Take 1 tablet (320 mg total) by mouth once daily., Disp: 90 tablet, Rfl: 0    vitamin E 400 UNIT capsule, Take 400 Units by mouth once daily.  , Disp: , Rfl:     zolpidem (AMBIEN) 5 MG Tab, Take 1 tablet (5 mg total) by " "mouth every evening., Disp: 30 tablet, Rfl: 5    blood-glucose meter kit, Use as instructed, Disp: 1 each, Rfl: 11    promethazine-dextromethorphan (PROMETHAZINE-DM) 6.25-15 mg/5 mL Syrp, Take 5 mLs by mouth nightly as needed., Disp: 180 mL, Rfl: 0   Objective:      Vitals:    10/30/19 1258   BP: 126/68   BP Location: Right arm   Patient Position: Sitting   BP Method: Medium (Manual)   Pulse: (!) 114   Resp: 16   Temp: 98.4 °F (36.9 °C)   TempSrc: Oral   SpO2: (!) 94%   Weight: 86.6 kg (190 lb 14.7 oz)   Height: 5' 6" (1.676 m)       Physical Exam   Constitutional: She is oriented to person, place, and time. No distress.   HENT:   Head: Normocephalic and atraumatic.   Mouth/Throat: No oropharyngeal exudate.   B/l nasal turbinate swelling   Eyes: Conjunctivae are normal.   Neck: Neck supple.   Cardiovascular: Normal rate, regular rhythm and normal heart sounds. Exam reveals no gallop and no friction rub.   No murmur heard.  Pulmonary/Chest: Effort normal and breath sounds normal. She has no wheezes. She has no rales.   Lymphadenopathy:     She has no cervical adenopathy.   Neurological: She is alert and oriented to person, place, and time.   Skin: Skin is warm and dry.   Psychiatric: She has a normal mood and affect. Her behavior is normal. Judgment and thought content normal.          Assessment:       1. Acute URI        Plan:       Acute URI  - Flu neg  - Advised pt to call if symptoms last a total of 10-14 days. Advised pt about otc meds to use. Advised pt about honey and saltwater gargling PRN.  -     promethazine-dextromethorphan (PROMETHAZINE-DM) 6.25-15 mg/5 mL Syrp; Take 5 mLs by mouth nightly as needed.  Dispense: 180 mL; Refill: 0  -     POCT Influenza A/B      Follow up if symptoms worsen or fail to improve.            Luis Laguerre MD  "

## 2019-10-30 NOTE — LETTER
October 30, 2019      [facility]  [address]  [place] [address]  Phone: [phone]  Fax: [phone]       Patient: Cindy Billingsley   YOB: 1960  Date of Visit: 10/30/2019    To Whom It May Concern:    Anibal Billingsley  was at [facility] on 10/30/2019. She may return to work/school on 11/1/19 with no restrictions. If you have any questions or concerns, or if I can be of further assistance, please do not hesitate to contact me.    Sincerely,      Luis Laguerre MD

## 2019-10-31 DIAGNOSIS — I10 ESSENTIAL HYPERTENSION: ICD-10-CM

## 2019-10-31 RX ORDER — NIFEDIPINE 60 MG/1
TABLET, EXTENDED RELEASE ORAL
Qty: 90 TABLET | Refills: 0 | Status: ON HOLD | OUTPATIENT
Start: 2019-10-31 | End: 2019-12-24 | Stop reason: HOSPADM

## 2019-10-31 NOTE — TELEPHONE ENCOUNTER
Please call patient to schedule follow up Office Visit.  No further refills will be authorized without being seen in the office.  Alternatively, she can have her PCP supply future refills.

## 2019-11-08 ENCOUNTER — PATIENT OUTREACH (OUTPATIENT)
Dept: OTHER | Facility: OTHER | Age: 59
End: 2019-11-08

## 2019-11-11 NOTE — PROGRESS NOTES
Digital Medicine: Clinician Follow-Up    Patient reports she may need to change valsartan as has been on backorder at [facility]. She has about 1 week remaining of valsartan.    Reports she still has headaches; per patient, not addressed at primary care appointment (for URI) or endo appointment.    Patient states she has taken BP every day (we did not receive readings 11/6-11/10). Patient was going into a meeting and needed to end call.    The history is provided by the patient.     Follow Up  Follow-up reason(s): reading review and medication change      Medication Change: alternative therapy        INTERVENTION(S)  encouragement/support    PLAN  patient verbalizes understanding    Advised patient I would call [facility] pharmacy regarding valsartan. [facility] confirmed that valsartan still on backorder. Called in telmisartan 80 mg daily as alternative.   Requested patient call me back to finish discussion regarding BP readings when she has more time. Will place task for tech support regarding missing readings.      There are no preventive care reminders to display for this patient.    Last 5 Patient Entered Readings                                      Current 30 Day Average: 148/87     Recent Readings 11/11/2019 11/5/2019 11/4/2019 11/4/2019 11/3/2019    SBP (mmHg) 138 143 147 128 155    DBP (mmHg) 88 88 89 82 89    Pulse 88 91 96 86 85             Hypertension Medications             chlorthalidone (HYGROTEN) 25 MG Tab Take 1 tablet (25 mg total) by mouth once daily. (replaces hydrochlorothiazide for BP)    NIFEdipine (PROCARDIA-XL) 60 MG (OSM) 24 hr tablet TAKE 1 TABLET BY MOUTH ONCE DAILY    valsartan (DIOVAN) 320 MG tablet Take 1 tablet (320 mg total) by mouth once daily.                 Screenings

## 2019-12-02 ENCOUNTER — PATIENT OUTREACH (OUTPATIENT)
Dept: OTHER | Facility: OTHER | Age: 59
End: 2019-12-02

## 2019-12-02 RX ORDER — TELMISARTAN 80 MG/1
80 TABLET ORAL DAILY
COMMUNITY
End: 2019-12-18

## 2019-12-02 NOTE — PROGRESS NOTES
Left voicemail requesting call back  Follow up if and when patient changed from valsartan to telmisartan (due to availability)  BP avg 144/88 mmHg  Possibly increase nifedipine    Hypertension Medications             chlorthalidone (HYGROTEN) 25 MG Tab Take 1 tablet (25 mg total) by mouth once daily. (replaces hydrochlorothiazide for BP)    NIFEdipine (PROCARDIA-XL) 60 MG (OSM) 24 hr tablet TAKE 1 TABLET BY MOUTH ONCE DAILY    telmisartan (MICARDIS) 80 MG Tab Take 80 mg by mouth once daily.

## 2019-12-13 DIAGNOSIS — F41.8 DEPRESSION WITH ANXIETY: ICD-10-CM

## 2019-12-15 RX ORDER — CLONAZEPAM 0.5 MG/1
0.5 TABLET ORAL 2 TIMES DAILY PRN
Qty: 60 TABLET | Refills: 2 | Status: SHIPPED | OUTPATIENT
Start: 2019-12-15 | End: 2020-05-06 | Stop reason: SDUPTHER

## 2019-12-16 NOTE — PROGRESS NOTES
Left voicemail requesting call back  Follow up if and when patient changed from valsartan to telmisartan (due to availability)  BP avg 151/88 mmHg  Discuss medication adherence and increasing nifedipine to 90 mg daily  She has PCP appointment 12/18/19    Hypertension Medications             chlorthalidone (HYGROTEN) 25 MG Tab Take 1 tablet (25 mg total) by mouth once daily. (replaces hydrochlorothiazide for BP)    NIFEdipine (PROCARDIA-XL) 60 MG (OSM) 24 hr tablet TAKE 1 TABLET BY MOUTH ONCE DAILY    telmisartan (MICARDIS) 80 MG Tab Take 80 mg by mouth once daily.

## 2019-12-18 ENCOUNTER — LAB VISIT (OUTPATIENT)
Dept: LAB | Facility: HOSPITAL | Age: 59
End: 2019-12-18
Attending: FAMILY MEDICINE
Payer: COMMERCIAL

## 2019-12-18 ENCOUNTER — OFFICE VISIT (OUTPATIENT)
Dept: FAMILY MEDICINE | Facility: CLINIC | Age: 59
End: 2019-12-18
Payer: COMMERCIAL

## 2019-12-18 VITALS
OXYGEN SATURATION: 96 % | DIASTOLIC BLOOD PRESSURE: 80 MMHG | HEART RATE: 93 BPM | HEIGHT: 66 IN | TEMPERATURE: 98 F | WEIGHT: 190.06 LBS | RESPIRATION RATE: 16 BRPM | BODY MASS INDEX: 30.54 KG/M2 | SYSTOLIC BLOOD PRESSURE: 154 MMHG

## 2019-12-18 DIAGNOSIS — M17.0 PRIMARY OSTEOARTHRITIS OF BOTH KNEES: ICD-10-CM

## 2019-12-18 DIAGNOSIS — Z79.4 TYPE 2 DIABETES MELLITUS WITH DIABETIC POLYNEUROPATHY, WITH LONG-TERM CURRENT USE OF INSULIN: ICD-10-CM

## 2019-12-18 DIAGNOSIS — I10 ESSENTIAL HYPERTENSION: ICD-10-CM

## 2019-12-18 DIAGNOSIS — I10 ESSENTIAL HYPERTENSION: Primary | ICD-10-CM

## 2019-12-18 DIAGNOSIS — E11.42 TYPE 2 DIABETES MELLITUS WITH DIABETIC POLYNEUROPATHY, WITH LONG-TERM CURRENT USE OF INSULIN: ICD-10-CM

## 2019-12-18 DIAGNOSIS — R80.9 MICROALBUMINURIA: ICD-10-CM

## 2019-12-18 LAB
ALBUMIN SERPL BCP-MCNC: 3.7 G/DL (ref 3.5–5.2)
ALBUMIN/CREAT UR: 161.4 UG/MG (ref 0–30)
ALP SERPL-CCNC: 128 U/L (ref 55–135)
ALT SERPL W/O P-5'-P-CCNC: 24 U/L (ref 10–44)
ANION GAP SERPL CALC-SCNC: 9 MMOL/L (ref 8–16)
AST SERPL-CCNC: 25 U/L (ref 10–40)
BILIRUB SERPL-MCNC: 0.6 MG/DL (ref 0.1–1)
BUN SERPL-MCNC: 19 MG/DL (ref 6–20)
CALCIUM SERPL-MCNC: 9.8 MG/DL (ref 8.7–10.5)
CHLORIDE SERPL-SCNC: 102 MMOL/L (ref 95–110)
CHOLEST SERPL-MCNC: 177 MG/DL (ref 120–199)
CHOLEST/HDLC SERPL: 4.1 {RATIO} (ref 2–5)
CO2 SERPL-SCNC: 28 MMOL/L (ref 23–29)
CREAT SERPL-MCNC: 1.3 MG/DL (ref 0.5–1.4)
CREAT UR-MCNC: 57 MG/DL (ref 15–325)
EST. GFR  (AFRICAN AMERICAN): 51.5 ML/MIN/1.73 M^2
EST. GFR  (NON AFRICAN AMERICAN): 44.7 ML/MIN/1.73 M^2
ESTIMATED AVG GLUCOSE: 197 MG/DL (ref 68–131)
GLUCOSE SERPL-MCNC: 297 MG/DL (ref 70–110)
HBA1C MFR BLD HPLC: 8.5 % (ref 4–5.6)
HDLC SERPL-MCNC: 43 MG/DL (ref 40–75)
HDLC SERPL: 24.3 % (ref 20–50)
LDLC SERPL CALC-MCNC: 67 MG/DL (ref 63–159)
MICROALBUMIN UR DL<=1MG/L-MCNC: 92 UG/ML
NONHDLC SERPL-MCNC: 134 MG/DL
POTASSIUM SERPL-SCNC: 4 MMOL/L (ref 3.5–5.1)
PROT SERPL-MCNC: 7.7 G/DL (ref 6–8.4)
SODIUM SERPL-SCNC: 139 MMOL/L (ref 136–145)
TRIGL SERPL-MCNC: 335 MG/DL (ref 30–150)

## 2019-12-18 PROCEDURE — 80061 LIPID PANEL: CPT

## 2019-12-18 PROCEDURE — 99999 PR PBB SHADOW E&M-EST. PATIENT-LVL IV: ICD-10-PCS | Mod: PBBFAC,,, | Performed by: FAMILY MEDICINE

## 2019-12-18 PROCEDURE — 80053 COMPREHEN METABOLIC PANEL: CPT

## 2019-12-18 PROCEDURE — 99214 OFFICE O/P EST MOD 30 MIN: CPT | Mod: S$GLB,,, | Performed by: FAMILY MEDICINE

## 2019-12-18 PROCEDURE — 99214 PR OFFICE/OUTPT VISIT, EST, LEVL IV, 30-39 MIN: ICD-10-PCS | Mod: S$GLB,,, | Performed by: FAMILY MEDICINE

## 2019-12-18 PROCEDURE — 82043 UR ALBUMIN QUANTITATIVE: CPT

## 2019-12-18 PROCEDURE — 36415 COLL VENOUS BLD VENIPUNCTURE: CPT | Mod: PO

## 2019-12-18 PROCEDURE — 83036 HEMOGLOBIN GLYCOSYLATED A1C: CPT

## 2019-12-18 PROCEDURE — 99999 PR PBB SHADOW E&M-EST. PATIENT-LVL IV: CPT | Mod: PBBFAC,,, | Performed by: FAMILY MEDICINE

## 2019-12-18 RX ORDER — HYDRALAZINE HYDROCHLORIDE 25 MG/1
25 TABLET, FILM COATED ORAL 3 TIMES DAILY
Qty: 90 TABLET | Refills: 2 | Status: SHIPPED | OUTPATIENT
Start: 2019-12-18 | End: 2020-03-18

## 2019-12-18 RX ORDER — IBUPROFEN 800 MG/1
800 TABLET ORAL 2 TIMES DAILY
Qty: 90 TABLET | Refills: 2 | Status: ON HOLD | OUTPATIENT
Start: 2019-12-18 | End: 2019-12-24 | Stop reason: HOSPADM

## 2019-12-18 RX ORDER — VALSARTAN 320 MG/1
320 TABLET ORAL DAILY
COMMUNITY
End: 2020-01-08

## 2019-12-18 NOTE — PROGRESS NOTES
Chief Complaint   Patient presents with    Hypertension     follow up     Knee Pain    Headache       Cindy Billingsley is a 59 y.o. female who presents per the Chief Complaint.  Pt is known to me and was last seen by me on 9/14/2019.  All known chronic medical issues have been documented.       Hypertension   This is a chronic problem. The current episode started more than 1 year ago. The problem has been waxing and waning since onset. The problem is uncontrolled. Associated symptoms include anxiety, blurred vision and headaches. Pertinent negatives include no chest pain, neck pain, palpitations, peripheral edema, shortness of breath or sweats. There are no associated agents to hypertension. Risk factors for coronary artery disease include diabetes mellitus, dyslipidemia and post-menopausal state. Past treatments include angiotensin blockers and diuretics. The current treatment provides moderate improvement. Compliance problems include psychosocial issues.  Hypertensive end-organ damage includes retinopathy. There is no history of angina, kidney disease, CAD/MI, CVA, heart failure, left ventricular hypertrophy or PVD. There is no history of chronic renal disease, coarctation of the aorta, hyperaldosteronism, hypercortisolism, hyperparathyroidism, a hypertension causing med, pheochromocytoma, renovascular disease, sleep apnea or a thyroid problem.   Knee Pain    The incident occurred more than 1 week ago. There was no injury mechanism. The pain is present in the right knee. The quality of the pain is described as aching. The pain is at a severity of 4/10. The pain is moderate. The pain has been fluctuating since onset. Associated symptoms include an inability to bear weight (gives out). Pertinent negatives include no loss of motion, loss of sensation, muscle weakness, numbness or tingling. She reports no foreign bodies present. The symptoms are aggravated by movement and weight bearing. She has tried NSAIDs for the  symptoms. The treatment provided moderate relief.   Headache    Associated symptoms include blurred vision and a visual change. Pertinent negatives include no abdominal pain, back pain, coughing, dizziness, ear pain, eye pain, fever, hearing loss, nausea, neck pain, numbness, rhinorrhea, seizures, sinus pressure, sore throat, tingling, vomiting, weakness or weight loss.   Diabetes   She presents for her follow-up diabetic visit. She has type 2 diabetes mellitus. Her disease course has been worsening. Hypoglycemia symptoms include headaches. Pertinent negatives for hypoglycemia include no confusion, dizziness, mood changes, nervousness/anxiousness, seizures, sleepiness, speech difficulty, sweats or tremors. Associated symptoms include blurred vision, foot paresthesias and visual change. Pertinent negatives for diabetes include no chest pain, no fatigue, no foot ulcerations, no polydipsia, no polyphagia, no polyuria, no weakness and no weight loss. There are no hypoglycemic complications. Symptoms are worsening. Diabetic complications include peripheral neuropathy and retinopathy. Pertinent negatives for diabetic complications include no autonomic neuropathy, CVA, heart disease, impotence, nephropathy or PVD. Risk factors for coronary artery disease include diabetes mellitus, hypertension and stress. Current diabetic treatment includes insulin injections and oral agent (monotherapy). She is compliant with treatment most of the time. Her weight is stable. She is following a generally healthy diet. She has not had a previous visit with a dietitian. She participates in exercise intermittently. An ACE inhibitor/angiotensin II receptor blocker is being taken. She does not see a podiatrist.Eye exam is current.        ROS  Review of Systems   Constitutional: Negative.  Negative for activity change, appetite change, chills, diaphoresis, fatigue, fever, unexpected weight change and weight loss.   HENT: Negative.  Negative for  "congestion, ear pain, hearing loss, nosebleeds, postnasal drip, rhinorrhea, sinus pressure, sneezing, sore throat and trouble swallowing.    Eyes: Positive for blurred vision and visual disturbance (occasional blurred vision). Negative for pain.   Respiratory: Negative for cough, choking and shortness of breath.    Cardiovascular: Negative for chest pain, palpitations and leg swelling.   Gastrointestinal: Negative for abdominal pain, constipation, diarrhea, nausea and vomiting.   Endocrine: Negative for polydipsia, polyphagia and polyuria.   Genitourinary: Negative for difficulty urinating, dysuria, frequency, impotence and urgency.   Musculoskeletal: Positive for arthralgias (both knees; right worse). Negative for back pain, gait problem, joint swelling, myalgias and neck pain.   Skin: Negative.    Allergic/Immunologic: Negative for environmental allergies and food allergies.   Neurological: Positive for headaches. Negative for dizziness, tingling, tremors, seizures, syncope, speech difficulty, weakness, light-headedness and numbness.   Psychiatric/Behavioral: Positive for sleep disturbance. Negative for confusion, decreased concentration and dysphoric mood. The patient is not nervous/anxious.        Physical Exam  Vitals:    12/18/19 1434   BP: (!) 154/80   Pulse:    Resp:    Temp:     Body mass index is 30.67 kg/m².  Weight: 86.2 kg (190 lb 0.6 oz)   Height: 5' 6" (167.6 cm)     Physical Exam   Constitutional: She is oriented to person, place, and time. She appears well-developed and well-nourished. She is active and cooperative.  Non-toxic appearance. She does not have a sickly appearance. She does not appear ill. No distress.   HENT:   Head: Normocephalic and atraumatic.   Right Ear: Hearing and external ear normal. No decreased hearing is noted.   Left Ear: Hearing and external ear normal. No decreased hearing is noted.   Nose: Nose normal. No rhinorrhea or nasal deformity.   Mouth/Throat: Uvula is midline and " oropharynx is clear and moist. She does not have dentures. Normal dentition.   Eyes: Pupils are equal, round, and reactive to light. Conjunctivae, EOM and lids are normal. Right eye exhibits no chemosis, no discharge and no exudate. No foreign body present in the right eye. Left eye exhibits no chemosis, no discharge and no exudate. No foreign body present in the left eye. No scleral icterus.   Neck: Normal range of motion and full passive range of motion without pain. Neck supple.   Cardiovascular: Normal rate, regular rhythm, S1 normal, S2 normal and normal heart sounds. Exam reveals no gallop and no friction rub.   No murmur heard.  Pulmonary/Chest: Effort normal and breath sounds normal. No accessory muscle usage. No respiratory distress. She has no decreased breath sounds. She has no wheezes. She has no rhonchi. She has no rales.   Abdominal: Soft. Normal appearance. She exhibits no distension. There is no hepatosplenomegaly. There is no tenderness. There is no rigidity, no rebound and no guarding.   Musculoskeletal:        Right knee: She exhibits decreased range of motion. She exhibits no swelling, no effusion, no ecchymosis, no deformity, no laceration, no erythema, normal alignment, no LCL laxity, normal patellar mobility, no bony tenderness, normal meniscus and no MCL laxity. Tenderness found. Medial joint line, lateral joint line and patellar tendon tenderness noted. No MCL and no LCL tenderness noted.        Left knee: She exhibits bony tenderness. She exhibits normal range of motion, no swelling, no effusion, no ecchymosis, no deformity, no laceration, no erythema, normal alignment, no LCL laxity and normal meniscus. Tenderness found. Medial joint line and lateral joint line tenderness noted. No MCL, no LCL and no patellar tendon tenderness noted.   Neurological: She is alert and oriented to person, place, and time. She has normal strength. No cranial nerve deficit or sensory deficit. She exhibits  normal muscle tone. She displays no seizure activity. Coordination and gait normal.   Skin: Skin is warm, dry and intact. No rash noted. She is not diaphoretic.   Psychiatric: She has a normal mood and affect. Her speech is normal and behavior is normal. Judgment and thought content normal. Cognition and memory are normal. She is attentive.       Assessment & Plan    Discussion of plan of care including treatment options regarding health and wellness were reviewed and discussed with patient.  Any changes to medication or treatment plan, as well as any screening blood test, imaging, or referrals to specialist, are documented.  Follow up as indicated.     1. Essential hypertension  Patient was counseled and encouraged to maintain a low sodium diet, as well as increasing physical activity.  Recommend random BP checks at home on a regular basis.  Repeat BP at end of visit was not improved. Will change medication at this time, and follow up in 2-3 months, or sooner if blood pressure does not improve over time.  Will start hydralazine TID; patient is enrolled in digital BP program and does not need to be rechecked in office.  - Comprehensive metabolic panel; Future  - hydrALAZINE (APRESOLINE) 25 MG tablet; Take 1 tablet (25 mg total) by mouth 3 (three) times daily.  Dispense: 90 tablet; Refill: 2    2. Type 2 diabetes mellitus with diabetic polyneuropathy, with long-term current use of insulin  Patient is encouraged to follow a diet low in carbohydrates and simple sugars.  Discussed simple vs. complex carbohydrates as well as eating times of certain meals. Advised to focus on good food choices and increased physical activity and encouraged to adhere to medication regimen and/or lifestyle adjustments, and to check glucose level as recommended.  Contact office if glucose levels are not improving over time.  Screening blood test is due at this time.       3. Primary osteoarthritis of both knees  Patient is advised that  arthritis is a result of regular daily use, and is caused by inflammation in the joint.  Patient was encouraged to exercise as tolerated, and attempt weight loss with sensible diet and lifestyle changes.  Patient was recommended to restart NSAID therapy to reduce inflammation, and to incorporate stretching into a daily routine.  Pain medication will be prescribed as necessary.  Patient should follow up if pain is not well controlled.  Will order imaging to further evaluate and manage accordingly.   - ibuprofen (ADVIL,MOTRIN) 800 MG tablet; Take 1 tablet (800 mg total) by mouth 2 (two) times daily. for 10 days  Dispense: 90 tablet; Refill: 2  - MRI Knee Without Contrast Right; Future       Follow up in about 4 months (around 4/18/2020), or if symptoms worsen or fail to improve.        ACTIVE MEDICAL ISSUES:  Documented in Problem List    PAST MEDICAL HISTORY  Documented    PAST SURGICAL HISTORY:  Documented    SOCIAL HISTORY:  Documented    FAMILY HISTORY:  Documented    ALLERGIES AND MEDICATIONS: updated and reviewed.  Documented    Health Maintenance       Date Due Completion Date    Shingles Vaccine (1 of 2) 12/15/2009 ---    Hemoglobin A1c 11/30/2019 8/30/2019    Override on 6/26/2015: Done (will do future ,  appoint 06/29/15)    Eye Exam 03/14/2020 3/14/2019    Override on 6/19/2017: Done (Dr Enrique)    Override on 12/16/2016: Done (Dr. Royal)    Override on 4/1/2016: Done (Dr Brown)    Override on 6/26/2015: Declined ( she will go until 08/2015)    Override on 6/24/2014: Declined (had one done)    Lipid Panel 05/21/2020 5/21/2019    Override on 1/12/2015: Done (future)    TETANUS VACCINE 08/01/2020 8/1/2010    Foot Exam 09/14/2020 9/14/2019    Override on 6/26/2018: Done    Override on 6/12/2017: Done    Override on 6/26/2015: Done (today)    Low Dose Statin 10/28/2020 10/28/2019    Mammogram 06/18/2021 6/18/2019    Pap Smear with HPV Cotest 01/29/2022 1/29/2019    Colonoscopy 11/16/2027  11/16/2017

## 2019-12-18 NOTE — PROGRESS NOTES
Digital Medicine: Clinician Follow-Up    Patient states she was able to continue filling valsartan and did not  telmisartan  She has a primary care appointment today    The history is provided by the patient.     Follow Up  Follow-up reason(s): reading review          INTERVENTION(S)  encouragement/support and denied questions    PLAN  patient verbalizes understanding and continue monitoring    Updated medication list  Encouraged patient to take home BP cuff to appointment today to assess technique and compare to in office reading  Would recommend increasing nifedipine to 90 mg daily if BP elevated         Last 5 Patient Entered Readings                                      Current 30 Day Average: 155/88     Recent Readings 12/18/2019 12/18/2019 12/17/2019 12/17/2019 12/16/2019    SBP (mmHg) 173 159 161 152 156    DBP (mmHg) 87 92 83 91 86    Pulse 89 83 88 85 88             Hypertension Medications             chlorthalidone (HYGROTEN) 25 MG Tab Take 1 tablet (25 mg total) by mouth once daily. (replaces hydrochlorothiazide for BP)    NIFEdipine (PROCARDIA-XL) 60 MG (OSM) 24 hr tablet TAKE 1 TABLET BY MOUTH ONCE DAILY    Valsartan 320 mg tablet Take 320 mg by mouth once daily.                 Screenings

## 2019-12-22 ENCOUNTER — HOSPITAL ENCOUNTER (OUTPATIENT)
Facility: HOSPITAL | Age: 59
Discharge: HOME | End: 2019-12-24
Attending: EMERGENCY MEDICINE | Admitting: HOSPITALIST
Payer: COMMERCIAL

## 2019-12-22 DIAGNOSIS — R00.2 PALPITATIONS: ICD-10-CM

## 2019-12-22 DIAGNOSIS — I10 ESSENTIAL HYPERTENSION: ICD-10-CM

## 2019-12-22 DIAGNOSIS — R07.9 CHEST PAIN: ICD-10-CM

## 2019-12-22 PROBLEM — N17.9 AKI (ACUTE KIDNEY INJURY): Status: ACTIVE | Noted: 2019-12-22

## 2019-12-22 LAB
ALBUMIN SERPL BCP-MCNC: 4 G/DL (ref 3.5–5.2)
ALP SERPL-CCNC: 124 U/L (ref 55–135)
ALT SERPL W/O P-5'-P-CCNC: 27 U/L (ref 10–44)
AMPHET+METHAMPHET UR QL: NEGATIVE
ANION GAP SERPL CALC-SCNC: 13 MMOL/L (ref 8–16)
AST SERPL-CCNC: 28 U/L (ref 10–40)
BARBITURATES UR QL SCN>200 NG/ML: NEGATIVE
BASOPHILS # BLD AUTO: 0.05 K/UL (ref 0–0.2)
BASOPHILS NFR BLD: 0.5 % (ref 0–1.9)
BENZODIAZ UR QL SCN>200 NG/ML: NEGATIVE
BILIRUB SERPL-MCNC: 0.6 MG/DL (ref 0.1–1)
BNP SERPL-MCNC: <10 PG/ML (ref 0–99)
BUN SERPL-MCNC: 30 MG/DL (ref 6–20)
BZE UR QL SCN: NEGATIVE
CALCIUM SERPL-MCNC: 10.3 MG/DL (ref 8.7–10.5)
CANNABINOIDS UR QL SCN: NEGATIVE
CHLORIDE SERPL-SCNC: 104 MMOL/L (ref 95–110)
CO2 SERPL-SCNC: 24 MMOL/L (ref 23–29)
CREAT SERPL-MCNC: 1.6 MG/DL (ref 0.5–1.4)
CREAT UR-MCNC: 119 MG/DL (ref 15–325)
CRP SERPL-MCNC: 2.6 MG/L (ref 0–8.2)
DIFFERENTIAL METHOD: NORMAL
EOSINOPHIL # BLD AUTO: 0.1 K/UL (ref 0–0.5)
EOSINOPHIL NFR BLD: 1.5 % (ref 0–8)
ERYTHROCYTE [DISTWIDTH] IN BLOOD BY AUTOMATED COUNT: 13 % (ref 11.5–14.5)
ERYTHROCYTE [SEDIMENTATION RATE] IN BLOOD BY WESTERGREN METHOD: 38 MM/HR (ref 0–36)
EST. GFR  (AFRICAN AMERICAN): 40.1 ML/MIN/1.73 M^2
EST. GFR  (NON AFRICAN AMERICAN): 34.8 ML/MIN/1.73 M^2
GLUCOSE SERPL-MCNC: 160 MG/DL (ref 70–110)
HCT VFR BLD AUTO: 40.4 % (ref 37–48.5)
HGB BLD-MCNC: 13.3 G/DL (ref 12–16)
IMM GRANULOCYTES # BLD AUTO: 0.02 K/UL (ref 0–0.04)
IMM GRANULOCYTES NFR BLD AUTO: 0.2 % (ref 0–0.5)
LYMPHOCYTES # BLD AUTO: 2.4 K/UL (ref 1–4.8)
LYMPHOCYTES NFR BLD: 25.5 % (ref 18–48)
MAGNESIUM SERPL-MCNC: 1.6 MG/DL (ref 1.6–2.6)
MCH RBC QN AUTO: 27.5 PG (ref 27–31)
MCHC RBC AUTO-ENTMCNC: 32.9 G/DL (ref 32–36)
MCV RBC AUTO: 84 FL (ref 82–98)
METHADONE UR QL SCN>300 NG/ML: NEGATIVE
MONOCYTES # BLD AUTO: 0.7 K/UL (ref 0.3–1)
MONOCYTES NFR BLD: 7.3 % (ref 4–15)
NEUTROPHILS # BLD AUTO: 6.1 K/UL (ref 1.8–7.7)
NEUTROPHILS NFR BLD: 65 % (ref 38–73)
NRBC BLD-RTO: 0 /100 WBC
OPIATES UR QL SCN: NEGATIVE
PCP UR QL SCN>25 NG/ML: NEGATIVE
PLATELET # BLD AUTO: 275 K/UL (ref 150–350)
PMV BLD AUTO: 11.2 FL (ref 9.2–12.9)
POCT GLUCOSE: 125 MG/DL (ref 70–110)
POTASSIUM SERPL-SCNC: 3.6 MMOL/L (ref 3.5–5.1)
PROCALCITONIN SERPL IA-MCNC: 0.04 NG/ML
PROT SERPL-MCNC: 7.7 G/DL (ref 6–8.4)
RBC # BLD AUTO: 4.84 M/UL (ref 4–5.4)
SODIUM SERPL-SCNC: 141 MMOL/L (ref 136–145)
TOXICOLOGY INFORMATION: NORMAL
TROPONIN I SERPL DL<=0.01 NG/ML-MCNC: 0.02 NG/ML (ref 0–0.03)
TROPONIN I SERPL DL<=0.01 NG/ML-MCNC: 0.03 NG/ML (ref 0–0.03)
TSH SERPL DL<=0.005 MIU/L-ACNC: 2.21 UIU/ML (ref 0.4–4)
WBC # BLD AUTO: 9.42 K/UL (ref 3.9–12.7)

## 2019-12-22 PROCEDURE — 96374 THER/PROPH/DIAG INJ IV PUSH: CPT

## 2019-12-22 PROCEDURE — 93010 EKG 12-LEAD: ICD-10-PCS | Mod: ,,, | Performed by: INTERNAL MEDICINE

## 2019-12-22 PROCEDURE — 99285 EMERGENCY DEPT VISIT HI MDM: CPT | Mod: ,,, | Performed by: PHYSICIAN ASSISTANT

## 2019-12-22 PROCEDURE — 99285 EMERGENCY DEPT VISIT HI MDM: CPT | Mod: 25

## 2019-12-22 PROCEDURE — 96361 HYDRATE IV INFUSION ADD-ON: CPT

## 2019-12-22 PROCEDURE — 99285 PR EMERGENCY DEPT VISIT,LEVEL V: ICD-10-PCS | Mod: ,,, | Performed by: PHYSICIAN ASSISTANT

## 2019-12-22 PROCEDURE — 63600175 PHARM REV CODE 636 W HCPCS: Performed by: PHYSICIAN ASSISTANT

## 2019-12-22 PROCEDURE — 25500020 PHARM REV CODE 255: Performed by: EMERGENCY MEDICINE

## 2019-12-22 PROCEDURE — 84443 ASSAY THYROID STIM HORMONE: CPT

## 2019-12-22 PROCEDURE — 25000003 PHARM REV CODE 250: Performed by: EMERGENCY MEDICINE

## 2019-12-22 PROCEDURE — 80307 DRUG TEST PRSMV CHEM ANLYZR: CPT

## 2019-12-22 PROCEDURE — 84484 ASSAY OF TROPONIN QUANT: CPT | Mod: 91

## 2019-12-22 PROCEDURE — 93005 ELECTROCARDIOGRAM TRACING: CPT

## 2019-12-22 PROCEDURE — 93010 ELECTROCARDIOGRAM REPORT: CPT | Mod: ,,, | Performed by: INTERNAL MEDICINE

## 2019-12-22 PROCEDURE — 86140 C-REACTIVE PROTEIN: CPT

## 2019-12-22 PROCEDURE — G0378 HOSPITAL OBSERVATION PER HR: HCPCS

## 2019-12-22 PROCEDURE — 63600175 PHARM REV CODE 636 W HCPCS: Performed by: EMERGENCY MEDICINE

## 2019-12-22 PROCEDURE — 83735 ASSAY OF MAGNESIUM: CPT

## 2019-12-22 PROCEDURE — 80053 COMPREHEN METABOLIC PANEL: CPT

## 2019-12-22 PROCEDURE — 25000003 PHARM REV CODE 250: Performed by: HOSPITALIST

## 2019-12-22 PROCEDURE — 84145 PROCALCITONIN (PCT): CPT

## 2019-12-22 PROCEDURE — 99220 PR INITIAL OBSERVATION CARE,LEVL III: ICD-10-PCS | Mod: ,,, | Performed by: HOSPITALIST

## 2019-12-22 PROCEDURE — 25000003 PHARM REV CODE 250: Performed by: PHYSICIAN ASSISTANT

## 2019-12-22 PROCEDURE — 63600175 PHARM REV CODE 636 W HCPCS: Performed by: STUDENT IN AN ORGANIZED HEALTH CARE EDUCATION/TRAINING PROGRAM

## 2019-12-22 PROCEDURE — 85025 COMPLETE CBC W/AUTO DIFF WBC: CPT

## 2019-12-22 PROCEDURE — 99220 PR INITIAL OBSERVATION CARE,LEVL III: CPT | Mod: ,,, | Performed by: HOSPITALIST

## 2019-12-22 PROCEDURE — 85652 RBC SED RATE AUTOMATED: CPT

## 2019-12-22 PROCEDURE — 83880 ASSAY OF NATRIURETIC PEPTIDE: CPT

## 2019-12-22 RX ORDER — PROCHLORPERAZINE EDISYLATE 5 MG/ML
5 INJECTION INTRAMUSCULAR; INTRAVENOUS EVERY 6 HOURS PRN
Status: DISCONTINUED | OUTPATIENT
Start: 2019-12-22 | End: 2019-12-22

## 2019-12-22 RX ORDER — ACETAMINOPHEN 325 MG/1
650 TABLET ORAL
Status: COMPLETED | OUTPATIENT
Start: 2019-12-22 | End: 2019-12-22

## 2019-12-22 RX ORDER — NAPROXEN SODIUM 220 MG/1
162 TABLET, FILM COATED ORAL
Status: COMPLETED | OUTPATIENT
Start: 2019-12-22 | End: 2019-12-22

## 2019-12-22 RX ORDER — ZOLPIDEM TARTRATE 5 MG/1
5 TABLET ORAL NIGHTLY PRN
Status: DISCONTINUED | OUTPATIENT
Start: 2019-12-22 | End: 2019-12-24 | Stop reason: HOSPADM

## 2019-12-22 RX ORDER — IPRATROPIUM BROMIDE AND ALBUTEROL SULFATE 2.5; .5 MG/3ML; MG/3ML
3 SOLUTION RESPIRATORY (INHALATION) EVERY 6 HOURS PRN
Status: DISCONTINUED | OUTPATIENT
Start: 2019-12-22 | End: 2019-12-24 | Stop reason: HOSPADM

## 2019-12-22 RX ORDER — ACETAMINOPHEN 325 MG/1
650 TABLET ORAL EVERY 4 HOURS PRN
Status: DISCONTINUED | OUTPATIENT
Start: 2019-12-22 | End: 2019-12-24 | Stop reason: HOSPADM

## 2019-12-22 RX ORDER — CLONAZEPAM 0.5 MG/1
0.5 TABLET ORAL
Status: COMPLETED | OUTPATIENT
Start: 2019-12-22 | End: 2019-12-22

## 2019-12-22 RX ORDER — ASPIRIN 81 MG/1
81 TABLET ORAL DAILY
Status: DISCONTINUED | OUTPATIENT
Start: 2019-12-23 | End: 2019-12-24 | Stop reason: HOSPADM

## 2019-12-22 RX ORDER — VALSARTAN 80 MG/1
320 TABLET ORAL DAILY
Status: DISCONTINUED | OUTPATIENT
Start: 2019-12-23 | End: 2019-12-24 | Stop reason: HOSPADM

## 2019-12-22 RX ORDER — CHLORTHALIDONE 25 MG/1
25 TABLET ORAL DAILY
Status: DISCONTINUED | OUTPATIENT
Start: 2019-12-23 | End: 2019-12-24 | Stop reason: HOSPADM

## 2019-12-22 RX ORDER — NAPROXEN SODIUM 220 MG/1
81 TABLET, FILM COATED ORAL
Status: COMPLETED | OUTPATIENT
Start: 2019-12-22 | End: 2019-12-22

## 2019-12-22 RX ORDER — ASPIRIN 325 MG
325 TABLET ORAL
Status: DISCONTINUED | OUTPATIENT
Start: 2019-12-22 | End: 2019-12-22

## 2019-12-22 RX ORDER — ONDANSETRON 2 MG/ML
4 INJECTION INTRAMUSCULAR; INTRAVENOUS EVERY 8 HOURS PRN
Status: DISCONTINUED | OUTPATIENT
Start: 2019-12-22 | End: 2019-12-22

## 2019-12-22 RX ORDER — IBUPROFEN 200 MG
16 TABLET ORAL
Status: DISCONTINUED | OUTPATIENT
Start: 2019-12-22 | End: 2019-12-24 | Stop reason: HOSPADM

## 2019-12-22 RX ORDER — INSULIN ASPART 100 [IU]/ML
1-10 INJECTION, SOLUTION INTRAVENOUS; SUBCUTANEOUS
Status: DISCONTINUED | OUTPATIENT
Start: 2019-12-22 | End: 2019-12-24 | Stop reason: HOSPADM

## 2019-12-22 RX ORDER — NITROGLYCERIN 0.4 MG/1
0.4 TABLET SUBLINGUAL EVERY 5 MIN PRN
Status: DISCONTINUED | OUTPATIENT
Start: 2019-12-22 | End: 2019-12-24 | Stop reason: HOSPADM

## 2019-12-22 RX ORDER — HYDRALAZINE HYDROCHLORIDE 25 MG/1
25 TABLET, FILM COATED ORAL 3 TIMES DAILY
Status: DISCONTINUED | OUTPATIENT
Start: 2019-12-23 | End: 2019-12-24 | Stop reason: HOSPADM

## 2019-12-22 RX ORDER — GLUCAGON 1 MG
1 KIT INJECTION
Status: DISCONTINUED | OUTPATIENT
Start: 2019-12-22 | End: 2019-12-24 | Stop reason: HOSPADM

## 2019-12-22 RX ORDER — SODIUM CHLORIDE 0.9 % (FLUSH) 0.9 %
10 SYRINGE (ML) INJECTION
Status: DISCONTINUED | OUTPATIENT
Start: 2019-12-22 | End: 2019-12-24 | Stop reason: HOSPADM

## 2019-12-22 RX ORDER — HEPARIN SODIUM 5000 [USP'U]/ML
5000 INJECTION, SOLUTION INTRAVENOUS; SUBCUTANEOUS EVERY 8 HOURS
Status: DISCONTINUED | OUTPATIENT
Start: 2019-12-23 | End: 2019-12-23

## 2019-12-22 RX ORDER — IBUPROFEN 200 MG
24 TABLET ORAL
Status: DISCONTINUED | OUTPATIENT
Start: 2019-12-22 | End: 2019-12-24 | Stop reason: HOSPADM

## 2019-12-22 RX ORDER — ATORVASTATIN CALCIUM 20 MG/1
40 TABLET, FILM COATED ORAL NIGHTLY
Status: DISCONTINUED | OUTPATIENT
Start: 2019-12-22 | End: 2019-12-24 | Stop reason: HOSPADM

## 2019-12-22 RX ADMIN — ACETAMINOPHEN 650 MG: 325 TABLET ORAL at 05:12

## 2019-12-22 RX ADMIN — ASPIRIN 81 MG CHEWABLE TABLET 81 MG: 81 TABLET CHEWABLE at 05:12

## 2019-12-22 RX ADMIN — ZOLPIDEM TARTRATE 5 MG: 5 TABLET ORAL at 11:12

## 2019-12-22 RX ADMIN — CLONAZEPAM 0.5 MG: 0.5 TABLET ORAL at 08:12

## 2019-12-22 RX ADMIN — ASPIRIN 81 MG CHEWABLE TABLET 162 MG: 81 TABLET CHEWABLE at 04:12

## 2019-12-22 RX ADMIN — ATORVASTATIN CALCIUM 40 MG: 20 TABLET, FILM COATED ORAL at 10:12

## 2019-12-22 RX ADMIN — NITROGLYCERIN 0.4 MG: 0.4 TABLET SUBLINGUAL at 06:12

## 2019-12-22 RX ADMIN — MAGNESIUM SULFATE HEPTAHYDRATE 1 G: 500 INJECTION, SOLUTION INTRAMUSCULAR; INTRAVENOUS at 10:12

## 2019-12-22 RX ADMIN — SODIUM CHLORIDE 1000 ML: 0.9 INJECTION, SOLUTION INTRAVENOUS at 05:12

## 2019-12-22 RX ADMIN — SODIUM CHLORIDE 1000 ML: 0.9 INJECTION, SOLUTION INTRAVENOUS at 06:12

## 2019-12-22 RX ADMIN — IOHEXOL 100 ML: 350 INJECTION, SOLUTION INTRAVENOUS at 07:12

## 2019-12-22 NOTE — ED NOTES
Pt's first and last name and birthday confirmed.   LOC: The patient is awake, alert and aware of environment with an appropriate affect, the patient is oriented x 3 and speaking appropriately.  APPEARANCE: Patient resting comfortably and in no acute distress, patient is clean and well groomed.  SKIN: The skin is warm and dry, patient has normal skin turgor and moist mucus membranes, skin intact, no breakdown or brusing noted.  MUSKULOSKELETAL: Patient moving all extremities well, no obvious swelling or deformities noted.  RESPIRATORY: Airway is open and patent, respirations are spontaneous, patient has a normal effort and rate. Breath sounds are clear and equal bilaterally.  CARDIAC: Normal heart sounds. No peripheral edema. Bounding peripheral pulses noted.   ABDOMEN: Soft and non tender to palpation, no distention noted. Bowel sounds present.   NEURO: No neuro deficits, hand grasp equal, no drift noted, no facial droop noted. Speech is clear. +headache

## 2019-12-22 NOTE — ED PROVIDER NOTES
"Encounter Date: 2019       History     Chief Complaint   Patient presents with    Chest Pain     Denies N/V, diaphoresis. Reports SOB.      60 y/o AAF with history of HTN, DM2, anxiety presents to the ED c/o sternal chest pain that began at 3pm today after waking from a nap. She describes the pain as intermittent 9/10 "crushing/pressure" that is non-radiating. She denies any worsening CP with exertion but does report MALDONADO. She reports associated headache, palpitations, lightheadedness. Her BP medication was recently changed from nifedipine to hydralazine. She denies f/c, abdominal pain, diaphoresis, n/v, dysuria, LE edema.     The history is provided by the patient.     Review of patient's allergies indicates:   Allergen Reactions    Pristiq  [desvenlafaxine]      Other reaction(s): Nausea     Past Medical History:   Diagnosis Date    Anxiety     Arthritis     Depression     Diabetes mellitus type II     Diabetic peripheral neuropathy 9/10/2012    Facial spasm - right side 9/10/2012    History of irregular menstrual bleeding     Hypertension     Knee pain      Past Surgical History:   Procedure Laterality Date    BREAST BIOPSY      breast reduction      COLONOSCOPY N/A 2017    Procedure: COLONOSCOPY;  Surgeon: Alan Acosta MD;  Location: [facility];  Service: Endoscopy;  Laterality: N/A;  confirmed appt    TOTAL REDUCTION MAMMOPLASTY      TUBAL LIGATION       Family History   Problem Relation Age of Onset    Heart disease Mother     Hypertension Mother     Diabetes Father     Breast cancer Neg Hx     Colon cancer Neg Hx     Stroke Neg Hx     Ovarian cancer Neg Hx     Melanoma Neg Hx     Cervical cancer Neg Hx     Endometrial cancer Neg Hx     Vaginal cancer Neg Hx      Social History     Tobacco Use    Smoking status: Former Smoker     Last attempt to quit: 1990     Years since quittin.9    Smokeless tobacco: Never Used    Tobacco comment: patient quit in  "   Substance Use Topics    Alcohol use: Yes     Alcohol/week: 0.0 standard drinks     Comment: Occasional    Drug use: No     Review of Systems   Constitutional: Negative for chills, diaphoresis and fever.   HENT: Negative for congestion, rhinorrhea and sore throat.    Eyes: Negative for photophobia and visual disturbance.   Respiratory: Positive for shortness of breath. Negative for cough.    Cardiovascular: Positive for chest pain and palpitations. Negative for leg swelling.   Gastrointestinal: Negative for abdominal pain, constipation, diarrhea, nausea and vomiting.   Genitourinary: Negative for dysuria and hematuria.   Musculoskeletal: Negative for myalgias and neck pain.   Skin: Negative for rash and wound.   Neurological: Positive for light-headedness and headaches. Negative for syncope and numbness.   Psychiatric/Behavioral: Negative for confusion.       Physical Exam     Initial Vitals [12/22/19 1618]   BP Pulse Resp Temp SpO2   (!) 148/67 (!) 123 16 98.6 °F (37 °C) 95 %      MAP       --         Physical Exam    Nursing note and vitals reviewed.  Constitutional: She appears well-developed and well-nourished. She is not diaphoretic. No distress.   HENT:   Head: Normocephalic and atraumatic.   Neck: Normal range of motion. Neck supple.   Cardiovascular: Regular rhythm and intact distal pulses. Tachycardia present.  Exam reveals no gallop and no friction rub.    Murmur heard.  No LE edema   Pulmonary/Chest: Breath sounds normal. She has no wheezes. She has no rhonchi. She has no rales.   Abdominal: Soft. Bowel sounds are normal. There is no tenderness. There is no rebound and no guarding.   Musculoskeletal: Normal range of motion.   Neurological: She is alert and oriented to person, place, and time.   Skin: Skin is warm and dry. No rash noted. No erythema.   Psychiatric: She has a normal mood and affect.         ED Course   Procedures  Labs Reviewed   COMPREHENSIVE METABOLIC PANEL - Abnormal; Notable for the  following components:       Result Value    Glucose 160 (*)     BUN, Bld 30 (*)     Creatinine 1.6 (*)     eGFR if  40.1 (*)     eGFR if non  34.8 (*)     All other components within normal limits    Narrative:     ADD ON PCAL [id number] PER DR. MCKENZIE  12/22/2019  17:34    TROPONIN I - Abnormal; Notable for the following components:    Troponin I 0.028 (*)     All other components within normal limits   SEDIMENTATION RATE - Abnormal; Notable for the following components:    Sed Rate 38 (*)     All other components within normal limits    Narrative:     ADD ON PCAL [id number] PER DR. MCKENZIE  12/22/2019  17:34   add on crp [id number] per royce mckenzie  17:52  12/22/2019   ADD ON ESR [id number] PER ROYCE mckenzie  12/22/2019  17:56    CBC W/ AUTO DIFFERENTIAL   TROPONIN I    Narrative:     ADD ON PCAL [id number] PER DR. MCKENZIE  12/22/2019  17:34   add on crp [id number] per royce mckenzie  17:52  12/22/2019   ADD ON ESR [id number] PER ROYCE mckenzie  12/22/2019  17:56    B-TYPE NATRIURETIC PEPTIDE    Narrative:     ADD ON PCAL [id number] PER DR. MCKENZIE  12/22/2019  17:34    TSH    Narrative:     ADD ON PCAL [id number] PER DR. MCKENZIE  12/22/2019  17:34   add on crp [id number] per royce mckenzie  17:52  12/22/2019   ADD ON ESR [id number] PER ROYCE mckenzie  12/22/2019  17:56    DRUG SCREEN PANEL, URINE EMERGENCY   SEDIMENTATION RATE   C-REACTIVE PROTEIN   PROCALCITONIN   PROCALCITONIN    Narrative:     ADD ON PCAL [id number] PER DR. MCKENZIE  12/22/2019  17:34   add on crp [id number] per royce mckenzie  17:52  12/22/2019   ADD ON ESR [id number] PER ROYCE mckenzie  12/22/2019  17:56    C-REACTIVE PROTEIN    Narrative:     ADD ON PCAL [id number] PER DR. MCKENZIE  12/22/2019  17:34   add on crp [id number] per royce mckenzie  17:52  12/22/2019   ADD ON ESR [id number] PER ROYCE mckenzie  12/22/2019  17:56    MAGNESIUM   MAGNESIUM    Narrative:     ADD ON MG  [id number] PER DR. REMIGIO GREEN  12/22/2019  21:41    POCT GLUCOSE MONITORING CONTINUOUS     EKG Readings: (Independently Interpreted)   Initial Reading: No STEMI. Rhythm: Sinus Tachycardia. Heart Rate: 120. Ectopy: No Ectopy.       Imaging Results          CTA Chest Non-Coronary (PE Study) (Final result)  Result time 12/22/19 21:04:27    Final result by Miguel Okeefe MD (12/22/19 21:04:27)                 Impression:      No evidence of pulmonary thromboembolism or infarct.    Scattered aortic atherosclerotic calcification.    Electronically signed by resident: Jayshree Keenan  Date:    12/22/2019  Time:    20:06    Electronically signed by: Miguel Okeefe MD  Date:    12/22/2019  Time:    21:04             Narrative:    EXAMINATION:  CTA CHEST NON CORONARY    CLINICAL HISTORY:  Chest pain, acute, PE suspected, low pretest prob;    TECHNIQUE:  Low dose axial images, sagittal and coronal reformations were obtained from the thoracic inlet to the lung bases following the IV administration of 100 mL of Omnipaque 350.  Contrast timing was optimized to evaluate the pulmonary arteries.    COMPARISON:  Radiograph chest PA and lateral 12/22/2019    FINDINGS:  Pulmonary vasculature: Satisfactory opacification of the pulmonary arterial system with no filling defect to the segmental level.    Aorta: Left-sided aortic arch.  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  Scattered aortic atherosclerotic calcification.    Base of Neck: No significant abnormality.    Thoracic soft tissues: Normal.    Heart: Normal size. No effusion.    Leonor/Mediastinum: No pathologic sophia enlargement.    Airways: Patent.    Lungs/Pleura: Symmetrically expanded without focal mass or consolidation.  There is bibasilar and subsegmental atelectasis.  No pneumothorax or pleural effusion.    Esophagus: Normal.    Upper Abdomen: No abnormality of the partially imaged upper  abdomen.    Bones: No acute fracture. No suspicious lytic or sclerotic lesions.                               X-Ray Chest PA And Lateral (Final result)  Result time 12/22/19 17:31:42    Final result by Macho Anderson MD (12/22/19 17:31:42)                 Impression:      No detrimental change or radiographic acute intrathoracic process seen.      Electronically signed by: Macho Anderson MD  Date:    12/22/2019  Time:    17:31             Narrative:    EXAMINATION:  XR CHEST PA AND LATERAL    CLINICAL HISTORY:  Chest Pain;    TECHNIQUE:  PA and lateral views of the chest were performed.    COMPARISON:  Chest radiograph 09/24/2018    FINDINGS:  Monitoring leads overlie the chest.  No detrimental change.The lungs are clear, with normal appearance of pulmonary vasculature and no pleural effusion or pneumothorax.    The cardiac silhouette is normal in size. The hilar and mediastinal contours are within normal limits noting calcific atherosclerosis of the arch.    Osseous structures appear stable without acute process seen.                                 Medical Decision Making:   History:   Old Medical Records: I decided to obtain old medical records.  Old Records Summarized: records from clinic visits and records from previous admission(s).       <> Summary of Records: TTE 10/2/18 - EF 60%  3/2018 - stress test with no ischemia or injury  Clinical Tests:   Lab Tests: Ordered and Reviewed  Radiological Study: Ordered and Reviewed  Medical Tests: Reviewed and Ordered       APC / Resident Notes:   58 y/o AAF with history of HTN, DM2, anxiety presents to the ED c/o sternal chest pain that began at 3pm after waking from a nap. Tachycardic. +murmur. Lungs clear. Abdomen soft. No LE edema. DDx includes but is not limited to acs, pe, dehydration, bear. Will check labs, CXR, CTA.     No leukocytosis. BEAR noted, Cr 1.6 (baseline 1.0). Troponin and BNP normal. CRP normal. ESR minimally elevated at 38. Procalcitonin normal.      6:33 PM  Chest pain resolved with nitroglycerin. Denies any current CP. Reports headache and palpitations. 1L bolus complete. Remains tachycardic at 108. Second liter ordered. CTA ordered.  Repeat troponin scheduled for 7:52p.    7:06 PM  Signed out to on-coming ED team pending CTA and repeat troponin. I anticipate admission.              Attending Attestation:     Physician Attestation Statement for NP/PA:   I have conducted a face to face encounter with this patient in addition to the NP/PA, due to Medical Complexity          Attending ED Notes:   59-year-old female presents with shortness of breath. On arrival she is noted to be tachycardic.  No murmur heard which has not been noted prior.  Initial considerations included ACS, possible PE.  Initial troponin negative. Sent for PE study which was also negative. Heart rate improved with medications and IV fluids.  Second troponin noted to be elevated at the time of admission, discussed heparin infusion with Internal Medicine and defer to for order placement.                        Clinical Impression:       ICD-10-CM ICD-9-CM   1. Chest pain R07.9 786.50         Disposition:   Disposition: Admitted  Condition: Jacqui Guillen PA-C  12/22/19 1907       Gilmer Newton MD  12/23/19 0142

## 2019-12-22 NOTE — ED TRIAGE NOTES
Pt presents with mid sternal chest pressure and palpitations. Symptoms began at approximately 1500 today. Pt also reports exertional SOB.

## 2019-12-23 PROBLEM — R00.2 PALPITATIONS: Status: ACTIVE | Noted: 2019-12-23

## 2019-12-23 LAB
ANION GAP SERPL CALC-SCNC: 8 MMOL/L (ref 8–16)
BASOPHILS # BLD AUTO: 0.08 K/UL (ref 0–0.2)
BASOPHILS NFR BLD: 1 % (ref 0–1.9)
BUN SERPL-MCNC: 21 MG/DL (ref 6–20)
CALCIUM SERPL-MCNC: 9 MG/DL (ref 8.7–10.5)
CHLORIDE SERPL-SCNC: 112 MMOL/L (ref 95–110)
CO2 SERPL-SCNC: 21 MMOL/L (ref 23–29)
CREAT SERPL-MCNC: 0.9 MG/DL (ref 0.5–1.4)
DIFFERENTIAL METHOD: ABNORMAL
EOSINOPHIL # BLD AUTO: 0.1 K/UL (ref 0–0.5)
EOSINOPHIL NFR BLD: 1.8 % (ref 0–8)
ERYTHROCYTE [DISTWIDTH] IN BLOOD BY AUTOMATED COUNT: 13.2 % (ref 11.5–14.5)
EST. GFR  (AFRICAN AMERICAN): >60 ML/MIN/1.73 M^2
EST. GFR  (NON AFRICAN AMERICAN): >60 ML/MIN/1.73 M^2
GLUCOSE SERPL-MCNC: 162 MG/DL (ref 70–110)
HCT VFR BLD AUTO: 39.5 % (ref 37–48.5)
HGB BLD-MCNC: 12.4 G/DL (ref 12–16)
IMM GRANULOCYTES # BLD AUTO: 0.03 K/UL (ref 0–0.04)
IMM GRANULOCYTES NFR BLD AUTO: 0.4 % (ref 0–0.5)
LYMPHOCYTES # BLD AUTO: 2 K/UL (ref 1–4.8)
LYMPHOCYTES NFR BLD: 26.2 % (ref 18–48)
MCH RBC QN AUTO: 27 PG (ref 27–31)
MCHC RBC AUTO-ENTMCNC: 31.4 G/DL (ref 32–36)
MCV RBC AUTO: 86 FL (ref 82–98)
MONOCYTES # BLD AUTO: 0.7 K/UL (ref 0.3–1)
MONOCYTES NFR BLD: 9.3 % (ref 4–15)
NEUTROPHILS # BLD AUTO: 4.7 K/UL (ref 1.8–7.7)
NEUTROPHILS NFR BLD: 61.3 % (ref 38–73)
NRBC BLD-RTO: 0 /100 WBC
PLATELET # BLD AUTO: 258 K/UL (ref 150–350)
PMV BLD AUTO: 11.3 FL (ref 9.2–12.9)
POCT GLUCOSE: 139 MG/DL (ref 70–110)
POCT GLUCOSE: 146 MG/DL (ref 70–110)
POCT GLUCOSE: 150 MG/DL (ref 70–110)
POCT GLUCOSE: 178 MG/DL (ref 70–110)
POTASSIUM SERPL-SCNC: 4.1 MMOL/L (ref 3.5–5.1)
RBC # BLD AUTO: 4.59 M/UL (ref 4–5.4)
SODIUM SERPL-SCNC: 141 MMOL/L (ref 136–145)
TROPONIN I SERPL DL<=0.01 NG/ML-MCNC: 0.05 NG/ML (ref 0–0.03)
TROPONIN I SERPL DL<=0.01 NG/ML-MCNC: 0.06 NG/ML (ref 0–0.03)
WBC # BLD AUTO: 7.72 K/UL (ref 3.9–12.7)

## 2019-12-23 PROCEDURE — C9399 UNCLASSIFIED DRUGS OR BIOLOG: HCPCS | Performed by: HOSPITALIST

## 2019-12-23 PROCEDURE — 80048 BASIC METABOLIC PNL TOTAL CA: CPT

## 2019-12-23 PROCEDURE — 84484 ASSAY OF TROPONIN QUANT: CPT | Mod: 91

## 2019-12-23 PROCEDURE — 82962 GLUCOSE BLOOD TEST: CPT

## 2019-12-23 PROCEDURE — 63600175 PHARM REV CODE 636 W HCPCS: Performed by: HOSPITALIST

## 2019-12-23 PROCEDURE — 84484 ASSAY OF TROPONIN QUANT: CPT

## 2019-12-23 PROCEDURE — 96372 THER/PROPH/DIAG INJ SC/IM: CPT

## 2019-12-23 PROCEDURE — 99226 PR SUBSEQUENT OBSERVATION CARE,LEVEL III: ICD-10-PCS | Mod: ,,, | Performed by: HOSPITALIST

## 2019-12-23 PROCEDURE — 25000003 PHARM REV CODE 250: Performed by: HOSPITALIST

## 2019-12-23 PROCEDURE — G0378 HOSPITAL OBSERVATION PER HR: HCPCS

## 2019-12-23 PROCEDURE — 36415 COLL VENOUS BLD VENIPUNCTURE: CPT

## 2019-12-23 PROCEDURE — 85025 COMPLETE CBC W/AUTO DIFF WBC: CPT

## 2019-12-23 PROCEDURE — 99226 PR SUBSEQUENT OBSERVATION CARE,LEVEL III: CPT | Mod: ,,, | Performed by: HOSPITALIST

## 2019-12-23 RX ORDER — SODIUM CHLORIDE 0.9 % (FLUSH) 0.9 %
10 SYRINGE (ML) INJECTION
Status: DISCONTINUED | OUTPATIENT
Start: 2019-12-23 | End: 2019-12-24 | Stop reason: HOSPADM

## 2019-12-23 RX ORDER — ENOXAPARIN SODIUM 100 MG/ML
40 INJECTION SUBCUTANEOUS EVERY 24 HOURS
Status: DISCONTINUED | OUTPATIENT
Start: 2019-12-23 | End: 2019-12-24 | Stop reason: HOSPADM

## 2019-12-23 RX ADMIN — ZOLPIDEM TARTRATE 5 MG: 5 TABLET ORAL at 09:12

## 2019-12-23 RX ADMIN — CHLORTHALIDONE 25 MG: 25 TABLET ORAL at 08:12

## 2019-12-23 RX ADMIN — INSULIN DETEMIR 40 UNITS: 100 INJECTION, SOLUTION SUBCUTANEOUS at 08:12

## 2019-12-23 RX ADMIN — HYDRALAZINE HYDROCHLORIDE 25 MG: 25 TABLET ORAL at 04:12

## 2019-12-23 RX ADMIN — ATORVASTATIN CALCIUM 40 MG: 20 TABLET, FILM COATED ORAL at 09:12

## 2019-12-23 RX ADMIN — ASPIRIN 81 MG: 81 TABLET, DELAYED RELEASE ORAL at 08:12

## 2019-12-23 RX ADMIN — HYDRALAZINE HYDROCHLORIDE 25 MG: 25 TABLET ORAL at 09:12

## 2019-12-23 RX ADMIN — HYDRALAZINE HYDROCHLORIDE 25 MG: 25 TABLET ORAL at 08:12

## 2019-12-23 RX ADMIN — HEPARIN SODIUM 5000 UNITS: 5000 INJECTION, SOLUTION INTRAVENOUS; SUBCUTANEOUS at 05:12

## 2019-12-23 RX ADMIN — ACETAMINOPHEN 650 MG: 325 TABLET ORAL at 09:12

## 2019-12-23 RX ADMIN — VALSARTAN 320 MG: 80 TABLET, FILM COATED ORAL at 08:12

## 2019-12-23 NOTE — CONSULTS
[facility]  Cardiology  Consult Note    Patient Name: Cindy Billingsley  MRN: [id number]  Admission Date: 12/22/2019  Hospital Length of Stay: 0 days  Code Status: Full Code   Attending Provider: Andrea Vargas MD   Consulting Provider: Ivana Fabian MD  Primary Care Physician: Azikiwe K Lombard, MD  Principal Problem:Chest pain    Patient information was obtained from patient and ER records.     Inpatient consult to Cardiology  Consult performed by: Ivana Fabian MD  Consult ordered by: Andrea Vargas MD        Subjective:     Chief Complaint:  Chest Pain     HPI:   Shawn, 59 y F with PMH HTN, DM2, HLD p/w palpitations, SOB and Substernal chest pain that started after waking up yesterday whil;e laying down. No radiation. Did not take nitro. No chest pain on exertion. Negative SPECT last year. EF normal in past. She complains of palpitations over last 2 weeks daily correlation with HR in 120's on smart watch. She denies prior CP before now. She complains of new onset SOB for 1 month. Trop 0.02 ->0.057->0.052. EKG Non specific ST/TW changes, repeat EKG normal except for prolonged QT. 's systolic on presentation. CP Lasted several minutes but resolved on its own.    Past Medical History:   Diagnosis Date    Anxiety     Arthritis     Depression     Diabetes mellitus type II     Diabetic peripheral neuropathy 9/10/2012    Facial spasm - right side 9/10/2012    History of irregular menstrual bleeding     Hypertension     Knee pain        Past Surgical History:   Procedure Laterality Date    BREAST BIOPSY      breast reduction      COLONOSCOPY N/A 11/16/2017    Procedure: COLONOSCOPY;  Surgeon: Alan Acosta MD;  Location: [facility];  Service: Endoscopy;  Laterality: N/A;  confirmed appt    TOTAL REDUCTION MAMMOPLASTY      TUBAL LIGATION         Review of patient's allergies indicates:   Allergen Reactions    Pristiq  [desvenlafaxine]      Other  "reaction(s): Nausea       No current facility-administered medications on file prior to encounter.      Current Outpatient Medications on File Prior to Encounter   Medication Sig    aspirin (ADULT ASPIRIN EC LOW STRENGTH) 81 MG EC tablet Take 81 mg by mouth. 1 Tablet, Delayed Release (E.C.) Oral Every day    atorvastatin (LIPITOR) 40 MG tablet Take 1 tablet (40 mg total) by mouth every evening.    blood sugar diagnostic (BLOOD GLUCOSE TEST) Strp One touch (Patient taking differently: 3 (three) times daily. One touch)    chlorthalidone (HYGROTEN) 25 MG Tab Take 1 tablet (25 mg total) by mouth once daily. (replaces hydrochlorothiazide for BP)    clonazePAM (KLONOPIN) 0.5 MG tablet Take 1 tablet (0.5 mg total) by mouth 2 (two) times daily as needed for Anxiety.    diabetic supplies, miscellan. Kit Please change sensor every 14 days. FreeStyle Malka Sensor 28-day supply (2 sensors/month)    diclofenac sodium (VOLTAREN) 1 % Gel Apply 2 g topically 2 (two) times daily.    dulaglutide (TRULICITY) 1.5 mg/0.5 mL PnIj Inject 1.5 mg into the skin every 7 days.    FREESTYLE MALKA 14 DAY SENSOR Kit PLEASE CHANGE SENSOR EVERY 14 DAYS.    hydrALAZINE (APRESOLINE) 25 MG tablet Take 1 tablet (25 mg total) by mouth 3 (three) times daily.    ibuprofen (ADVIL,MOTRIN) 800 MG tablet Take 1 tablet (800 mg total) by mouth 2 (two) times daily. for 10 days    insulin glargine, TOUJEO, (TOUJEO SOLOSTAR U-300 INSULIN) 300 unit/mL (1.5 mL) InPn pen INJECT 58 UNITS SUBCUTANEOUSLY ONCE DAILY    insulin lispro (HUMALOG KWIKPEN INSULIN) 100 unit/mL InPn pen Inject each meal plus scale 150-200 +2, 201-250 +4, 251-300 +6, 301-350 +8, >350 +10.    insulin syringe-needle U-100 1/2 mL 30 x 5/16" Syrg     lancets (MICROLET LANCET) Misc Inject 1 each into the skin 3 (three) times daily.    metFORMIN (GLUCOPHAGE-XR) 500 MG 24 hr tablet Take 2 tablets (1,000 mg total) by mouth once daily.    multivitamin (MULTIVITAMIN) per tablet Take 1 " "tablet by mouth once daily.      NIFEdipine (PROCARDIA-XL) 60 MG (OSM) 24 hr tablet TAKE 1 TABLET BY MOUTH ONCE DAILY    pen needle, diabetic (PEN NEEDLE) 31 gauge x 5/16" Ndle USE ONE  SUBCUTANEOUSLY 4 TIMES DAILY    valsartan (DIOVAN) 320 MG tablet Take 320 mg by mouth once daily.    vitamin E 400 UNIT capsule Take 400 Units by mouth once daily.      zolpidem (AMBIEN) 5 MG Tab Take 1 tablet (5 mg total) by mouth every evening.    blood-glucose meter kit Use as instructed     Family History     Problem Relation (Age of Onset)    Diabetes Father    Heart disease Mother    Hypertension Mother        Tobacco Use    Smoking status: Former Smoker     Last attempt to quit: 1990     Years since quittin.9    Smokeless tobacco: Never Used    Tobacco comment: patient quit in    Substance and Sexual Activity    Alcohol use: Yes     Alcohol/week: 0.0 standard drinks     Comment: Occasional    Drug use: No    Sexual activity: Not Currently     Birth control/protection: None, Rhythm     Review of Systems   Constitution: Negative for chills, decreased appetite and diaphoresis.   HENT: Negative for congestion and ear discharge.    Eyes: Negative for blurred vision and discharge.   Cardiovascular: Negative for chest pain, dyspnea on exertion, irregular heartbeat, leg swelling and paroxysmal nocturnal dyspnea.   Respiratory: Negative for cough, hemoptysis and shortness of breath.    Gastrointestinal: Negative for abdominal pain.     Objective:     Vital Signs (Most Recent):  Temp: 98.5 °F (36.9 °C) (19 1104)  Pulse: 87 (19 1117)  Resp: 18 (19 1104)  BP: (!) 147/92 (19 1104)  SpO2: 98 % (19 1117) Vital Signs (24h Range):  Temp:  [98 °F (36.7 °C)-98.9 °F (37.2 °C)] 98.5 °F (36.9 °C)  Pulse:  [] 87  Resp:  [13-18] 18  SpO2:  [92 %-98 %] 98 %  BP: (120-177)/(67-92) 147/92     Weight: 86.2 kg (190 lb 0.6 oz)  Body mass index is 30.67 kg/m².    SpO2: 98 %  O2 Device (Oxygen " Therapy): nasal cannula      Intake/Output Summary (Last 24 hours) at 12/23/2019 1352  Last data filed at 12/23/2019 1300  Gross per 24 hour   Intake 1360 ml   Output --   Net 1360 ml       Lines/Drains/Airways     Peripheral Intravenous Line                 Peripheral IV - Single Lumen 12/22/19 1704 20 G Right Hand less than 1 day                Physical Exam   Constitutional: She is oriented to person, place, and time. She appears well-developed and well-nourished. No distress.   Eyes: Pupils are equal, round, and reactive to light. Conjunctivae are normal.   Neck: No tracheal deviation present. No thyromegaly present.   Cardiovascular: Normal rate, regular rhythm, normal heart sounds and intact distal pulses. Exam reveals no gallop and no friction rub.   No murmur heard.  Pulses:       Radial pulses are 2+ on the right side, and 2+ on the left side.        Femoral pulses are 2+ on the right side, and 2+ on the left side.  Pulmonary/Chest: Effort normal and breath sounds normal. No respiratory distress. She has no wheezes. She has no rales.   Abdominal: Soft. Bowel sounds are normal. She exhibits no distension. There is no tenderness.   Musculoskeletal: She exhibits no edema or deformity.   Neurological: She is alert and oriented to person, place, and time. No cranial nerve deficit. Coordination normal.   Skin: Skin is warm and dry. She is not diaphoretic.   Psychiatric: She has a normal mood and affect. Her behavior is normal.       Significant Labs:   BMP:   Recent Labs   Lab 12/22/19  1704 12/22/19 2005 12/23/19  0436   *  --  162*     --  141   K 3.6  --  4.1     --  112*   CO2 24  --  21*   BUN 30*  --  21*   CREATININE 1.6*  --  0.9   CALCIUM 10.3  --  9.0   MG  --  1.6  --        Significant Imaging: Echocardiogram:   Transthoracic echo (TTE) complete (Cupid Only):   Results for orders placed or performed during the hospital encounter of 10/02/18   Transthoracic echo (TTE) complete    Result Value Ref Range    BSA 1.95 m2    TDI SEPTAL 0.06     LV LATERAL E/E' RATIO 14.86     LV SEPTAL E/E' RATIO 17.33     LA WIDTH 3.34 cm    AORTIC VALVE CUSP SEPERATION 1.82 cm    TDI LATERAL 0.07     PV PEAK VELOCITY 0.83 cm/s    LVIDD 4.60 3.5 - 6.0 cm    IVS 0.94 0.6 - 1.1 cm    PW 0.99 0.6 - 1.1 cm    Ao root annulus 2.79 cm    LVIDS 3.17 2.1 - 4.0 cm    FS 31 28 - 44 %    QEF 58.84 %    LA volume 47.25 cm3    Sinus 2.81 cm    STJ 2.14 cm    Ascending aorta 2.71 cm    LV mass 151.28 g    LA size 3.74 cm    RVDD 3.29 cm    TAPSE 0.02 cm    Left Ventricle Relative Wall Thickness 0.42 cm    AV mean gradient 3.29 mmHg    AV valve area 2.93 cm2    MV valve area p 1/2 method 6.24 cm2    PV peak gradient 2.73 mmHg    E/A ratio 1.21     Mean e' 0.07     E wave decelartion time 121.54 msec    IVRT 0.08 msec    Pulm vein S/D ratio 1.69     LVOT diameter 2.04 cm    LVOT area 3.27 cm2    LVOT peak VTI 24.01 cm    Ao peak porfirio 1.31 m/s    Ao VTI 26.81 cm    LVOT stroke volume 78.44 cm3    AV peak gradient 6.86 mmHg    E/E' ratio 16.00     MV Peak E Porfirio 1.04 m/s    TR Max Porfirio 2.41 m/s    MV stenosis pressure 1/2 time 35.25 ms    MV Peak A Porfirio 0.86 m/s    PV Peak S Porfirio 0.61 m/s    PV Peak D Porfirio 0.36 m/s    LV Systolic Volume 40.00 mL    LV Systolic Volume Index 20.5 mL/m2    LV Diastolic Volume 97.18 mL    LV Diastolic Volume Index 49.84 mL/m2    LA Volume Index 24.2 mL/m2    LV Mass Index 77.6 g/m2    RA Major Axis 3.92 cm    Left Atrium Minor Axis 4.72 cm    Left Atrium Major Axis 4.21 cm    Triscuspid Valve Regurgitation Peak Gradient 23.23 mmHg    RA Width 3.35 cm    Right Atrial Pressure (from IVC) 3.0 mmHg    TV rest pulmonary artery pressure 26.23 mmHg    Narrative    · Left ventricle ejection fraction is normal at 60%  · Normal LV diastolic function.  · Mitral valve shows mild regurgitation.  · Tricuspid valve shows trace regurgitation.        Assessment and Plan:     * Chest pain  - Patient seen and examined  -  This is atypical chest pain but enough comorbidities that stress testing is reasonable  - Troponin , EKG not impressive  - Reasonable to hold off ACS treatment she is chest pain free now  - If she develops sypmtpms, elevated troponin, EKG chnages , HD compromise start ACS tx and call cardiology  - Stress TTE in AM, NPO at midnight  - ASA + High dose statin  - Diabetes control  - Stress relief  - I would use an ACE or ARB to control her BP in setting of uncontrolled DM.      Palpitations  - Nothing on tele but keeps having palpitations at home  - if we dont't find AT or AF in house I wiuld send her home on 48hr Holter Monitor  - I would hold BB until after 48 hr holter is complete except her stress echo is positive tomorrow and she needs ACS Tx        VTE Risk Mitigation (From admission, onward)         Ordered     enoxaparin injection 40 mg  Daily      12/23/19 0822     IP VTE HIGH RISK PATIENT  Once      12/23/19 0037     Place LINDA hose  Until discontinued      12/23/19 0037                Thank you for your consult. I will follow-up with patient. Please contact us if you have any additional questions.    Ivana Fabian MD  Cardiology   [facility]

## 2019-12-23 NOTE — ED NOTES
Pt up and walking to bathroom without assistance. She c/o headache and chest discomfort. She is aware that she is awaiting transport to [place]. Will continue to monitor.

## 2019-12-23 NOTE — HPI
Shawn, 59 y F with PMH HTN, DM2, HLD p/w palpitations, SOB and Substernal chest pain that started after waking up yesterday whil;e laying down. No radiation. Did not take nitro. No chest pain on exertion. Negative SPECT last year. EF normal in past. She complains of palpitations over last 2 weeks daily correlation with HR in 120's on smart watch. She denies prior CP before now. She complains of new onset SOB for 1 month. Trop 0.02 ->0.057->0.052. EKG Non specific ST/TW changes, repeat EKG normal except for prolonged QT. 's systolic on presentation. CP Lasted several minutes but resolved on its own.

## 2019-12-23 NOTE — ASSESSMENT & PLAN NOTE
- Nothing on tele but keeps having palpitations at home  - if we dont't find AT or AF in house I wiuld send her home on 48hr Holter Monitor  - I would hold BB until after 48 hr holter is complete except her stress echo is positive tomorrow and she needs ACS Tx

## 2019-12-23 NOTE — PLAN OF CARE
POC reviewed with patient.  VS stable overnight. Patient denies chest pain or/and shortness of breath with or without exertion.  Patient has remained NPO since midnight.  Plan is for patient to go for stress test today.  NAD noted, will continue to monitor.

## 2019-12-23 NOTE — PLAN OF CARE
12/23/19 1003   Post-Acute Status   Post-Acute Authorization Other   Other Status No Post-Acute Service Needs

## 2019-12-23 NOTE — SUBJECTIVE & OBJECTIVE
Past Medical History:   Diagnosis Date    Anxiety     Arthritis     Depression     Diabetes mellitus type II     Diabetic peripheral neuropathy 9/10/2012    Facial spasm - right side 9/10/2012    History of irregular menstrual bleeding     Hypertension     Knee pain        Past Surgical History:   Procedure Laterality Date    BREAST BIOPSY      breast reduction      COLONOSCOPY N/A 11/16/2017    Procedure: COLONOSCOPY;  Surgeon: Alan Acosta MD;  Location: [facility];  Service: Endoscopy;  Laterality: N/A;  confirmed appt    TOTAL REDUCTION MAMMOPLASTY      TUBAL LIGATION         Review of patient's allergies indicates:   Allergen Reactions    Pristiq  [desvenlafaxine]      Other reaction(s): Nausea       No current facility-administered medications on file prior to encounter.      Current Outpatient Medications on File Prior to Encounter   Medication Sig    aspirin (ADULT ASPIRIN EC LOW STRENGTH) 81 MG EC tablet Take 81 mg by mouth. 1 Tablet, Delayed Release (E.C.) Oral Every day    atorvastatin (LIPITOR) 40 MG tablet Take 1 tablet (40 mg total) by mouth every evening.    blood sugar diagnostic (BLOOD GLUCOSE TEST) Strp One touch (Patient taking differently: 3 (three) times daily. One touch)    chlorthalidone (HYGROTEN) 25 MG Tab Take 1 tablet (25 mg total) by mouth once daily. (replaces hydrochlorothiazide for BP)    clonazePAM (KLONOPIN) 0.5 MG tablet Take 1 tablet (0.5 mg total) by mouth 2 (two) times daily as needed for Anxiety.    diabetic supplies, miscellan. Kit Please change sensor every 14 days. FreeStyle Malka Sensor 28-day supply (2 sensors/month)    diclofenac sodium (VOLTAREN) 1 % Gel Apply 2 g topically 2 (two) times daily.    dulaglutide (TRULICITY) 1.5 mg/0.5 mL PnIj Inject 1.5 mg into the skin every 7 days.    FREESTYLE MALKA 14 DAY SENSOR Kit PLEASE CHANGE SENSOR EVERY 14 DAYS.    hydrALAZINE (APRESOLINE) 25 MG tablet Take 1 tablet (25 mg total) by mouth 3 (three)  "times daily.    ibuprofen (ADVIL,MOTRIN) 800 MG tablet Take 1 tablet (800 mg total) by mouth 2 (two) times daily. for 10 days    insulin glargine, TOUJEO, (TOUJEO SOLOSTAR U-300 INSULIN) 300 unit/mL (1.5 mL) InPn pen INJECT 58 UNITS SUBCUTANEOUSLY ONCE DAILY    insulin lispro (HUMALOG KWIKPEN INSULIN) 100 unit/mL InPn pen Inject each meal plus scale 150-200 +2, 201-250 +4, 251-300 +6, 301-350 +8, >350 +10.    insulin syringe-needle U-100 1/2 mL 30 x 5/16" Syrg     lancets (MICROLET LANCET) Misc Inject 1 each into the skin 3 (three) times daily.    metFORMIN (GLUCOPHAGE-XR) 500 MG 24 hr tablet Take 2 tablets (1,000 mg total) by mouth once daily.    multivitamin (MULTIVITAMIN) per tablet Take 1 tablet by mouth once daily.      NIFEdipine (PROCARDIA-XL) 60 MG (OSM) 24 hr tablet TAKE 1 TABLET BY MOUTH ONCE DAILY    pen needle, diabetic (PEN NEEDLE) 31 gauge x 5/16" Ndle USE ONE  SUBCUTANEOUSLY 4 TIMES DAILY    valsartan (DIOVAN) 320 MG tablet Take 320 mg by mouth once daily.    vitamin E 400 UNIT capsule Take 400 Units by mouth once daily.      zolpidem (AMBIEN) 5 MG Tab Take 1 tablet (5 mg total) by mouth every evening.    blood-glucose meter kit Use as instructed     Family History     Problem Relation (Age of Onset)    Diabetes Father    Heart disease Mother    Hypertension Mother        Tobacco Use    Smoking status: Former Smoker     Last attempt to quit: 1990     Years since quittin.9    Smokeless tobacco: Never Used    Tobacco comment: patient quit in    Substance and Sexual Activity    Alcohol use: Yes     Alcohol/week: 0.0 standard drinks     Comment: Occasional    Drug use: No    Sexual activity: Not Currently     Birth control/protection: None, Rhythm     Review of Systems   Constitution: Negative for chills, decreased appetite and diaphoresis.   HENT: Negative for congestion and ear discharge.    Eyes: Negative for blurred vision and discharge.   Cardiovascular: Negative for " chest pain, dyspnea on exertion, irregular heartbeat, leg swelling and paroxysmal nocturnal dyspnea.   Respiratory: Negative for cough, hemoptysis and shortness of breath.    Gastrointestinal: Negative for abdominal pain.     Objective:     Vital Signs (Most Recent):  Temp: 98.5 °F (36.9 °C) (12/23/19 1104)  Pulse: 87 (12/23/19 1117)  Resp: 18 (12/23/19 1104)  BP: (!) 147/92 (12/23/19 1104)  SpO2: 98 % (12/23/19 1117) Vital Signs (24h Range):  Temp:  [98 °F (36.7 °C)-98.9 °F (37.2 °C)] 98.5 °F (36.9 °C)  Pulse:  [] 87  Resp:  [13-18] 18  SpO2:  [92 %-98 %] 98 %  BP: (120-177)/(67-92) 147/92     Weight: 86.2 kg (190 lb 0.6 oz)  Body mass index is 30.67 kg/m².    SpO2: 98 %  O2 Device (Oxygen Therapy): nasal cannula      Intake/Output Summary (Last 24 hours) at 12/23/2019 1352  Last data filed at 12/23/2019 1300  Gross per 24 hour   Intake 1360 ml   Output --   Net 1360 ml       Lines/Drains/Airways     Peripheral Intravenous Line                 Peripheral IV - Single Lumen 12/22/19 1704 20 G Right Hand less than 1 day                Physical Exam   Constitutional: She is oriented to person, place, and time. She appears well-developed and well-nourished. No distress.   Eyes: Pupils are equal, round, and reactive to light. Conjunctivae are normal.   Neck: No tracheal deviation present. No thyromegaly present.   Cardiovascular: Normal rate, regular rhythm, normal heart sounds and intact distal pulses. Exam reveals no gallop and no friction rub.   No murmur heard.  Pulses:       Radial pulses are 2+ on the right side, and 2+ on the left side.        Femoral pulses are 2+ on the right side, and 2+ on the left side.  Pulmonary/Chest: Effort normal and breath sounds normal. No respiratory distress. She has no wheezes. She has no rales.   Abdominal: Soft. Bowel sounds are normal. She exhibits no distension. There is no tenderness.   Musculoskeletal: She exhibits no edema or deformity.   Neurological: She is alert and  oriented to person, place, and time. No cranial nerve deficit. Coordination normal.   Skin: Skin is warm and dry. She is not diaphoretic.   Psychiatric: She has a normal mood and affect. Her behavior is normal.       Significant Labs:   BMP:   Recent Labs   Lab 12/22/19 1704 12/22/19 2005 12/23/19  0436   *  --  162*     --  141   K 3.6  --  4.1     --  112*   CO2 24  --  21*   BUN 30*  --  21*   CREATININE 1.6*  --  0.9   CALCIUM 10.3  --  9.0   MG  --  1.6  --        Significant Imaging: Echocardiogram:   Transthoracic echo (TTE) complete (Cupid Only):   Results for orders placed or performed during the hospital encounter of 10/02/18   Transthoracic echo (TTE) complete   Result Value Ref Range    BSA 1.95 m2    TDI SEPTAL 0.06     LV LATERAL E/E' RATIO 14.86     LV SEPTAL E/E' RATIO 17.33     LA WIDTH 3.34 cm    AORTIC VALVE CUSP SEPERATION 1.82 cm    TDI LATERAL 0.07     PV PEAK VELOCITY 0.83 cm/s    LVIDD 4.60 3.5 - 6.0 cm    IVS 0.94 0.6 - 1.1 cm    PW 0.99 0.6 - 1.1 cm    Ao root annulus 2.79 cm    LVIDS 3.17 2.1 - 4.0 cm    FS 31 28 - 44 %    QEF 58.84 %    LA volume 47.25 cm3    Sinus 2.81 cm    STJ 2.14 cm    Ascending aorta 2.71 cm    LV mass 151.28 g    LA size 3.74 cm    RVDD 3.29 cm    TAPSE 0.02 cm    Left Ventricle Relative Wall Thickness 0.42 cm    AV mean gradient 3.29 mmHg    AV valve area 2.93 cm2    MV valve area p 1/2 method 6.24 cm2    PV peak gradient 2.73 mmHg    E/A ratio 1.21     Mean e' 0.07     E wave decelartion time 121.54 msec    IVRT 0.08 msec    Pulm vein S/D ratio 1.69     LVOT diameter 2.04 cm    LVOT area 3.27 cm2    LVOT peak VTI 24.01 cm    Ao peak porfirio 1.31 m/s    Ao VTI 26.81 cm    LVOT stroke volume 78.44 cm3    AV peak gradient 6.86 mmHg    E/E' ratio 16.00     MV Peak E Porfirio 1.04 m/s    TR Max Porfirio 2.41 m/s    MV stenosis pressure 1/2 time 35.25 ms    MV Peak A Porfirio 0.86 m/s    PV Peak S Porfirio 0.61 m/s    PV Peak D Porfirio 0.36 m/s    LV Systolic Volume 40.00 mL     LV Systolic Volume Index 20.5 mL/m2    LV Diastolic Volume 97.18 mL    LV Diastolic Volume Index 49.84 mL/m2    LA Volume Index 24.2 mL/m2    LV Mass Index 77.6 g/m2    RA Major Axis 3.92 cm    Left Atrium Minor Axis 4.72 cm    Left Atrium Major Axis 4.21 cm    Triscuspid Valve Regurgitation Peak Gradient 23.23 mmHg    RA Width 3.35 cm    Right Atrial Pressure (from IVC) 3.0 mmHg    TV rest pulmonary artery pressure 26.23 mmHg    Narrative    · Left ventricle ejection fraction is normal at 60%  · Normal LV diastolic function.  · Mitral valve shows mild regurgitation.  · Tricuspid valve shows trace regurgitation.

## 2019-12-23 NOTE — ASSESSMENT & PLAN NOTE
- Patient seen and examined  - This is atypical chest pain but enough comorbidities that stress testing is reasonable  - Troponin , EKG not impressive  - Reasonable to hold off ACS treatment she is chest pain free now  - If she develops sypmtpms, elevated troponin, EKG chnages , HD compromise start ACS tx and call cardiology  - Stress TTE in AM, NPO at midnight  - ASA + High dose statin  - Diabetes control  - Stress relief

## 2019-12-23 NOTE — ED NOTES
Pt family at bedside resting comfortably on cardiac, bp and o2 monitor. RR 18 even and unlabored. PT aware waiting for troponin lab to be drawn at 1952.

## 2019-12-23 NOTE — H&P
Hospital Medicine  History and Physical Exam    Team: Networked reference to record PCT  Travis Henderson MD  Admit Date: 12/22/2019  Principal Problem:  Chest pain   Patient information was obtained from patient, past medical records and ER records.   Primary care Physician: Azikiwe K Lombard, MD  Code status: Full Code    HPI:   58 yo F with PMHx HTN, DM2, and HLD was in her usual state of health until about 3 pm today when she woke up from a nap and started having chest pain and palpitations. The patient described the pain as a 9/10 pressure like midsternal pain with associated palpitations, SOB, and lightheadedness/dizziness. The patient states that the pain did not wake her up, but rather started just after she woke up. She denies any radiation, nausea/vomiting, or diaphoresis. She states that the pain lasted until she got to the hospital which was at least a couple of hours. On my interview, she reports some mild discomfort still present. She states that the SOB went away quickly, but the palpitations lasted until a few hours ago. She has never had chest pain like this in the past. She does report having palpitations occasionally in the middle of the night, but she did not seek medical attention until the pain occurred today.      TTE 10/2018  · Left ventricle ejection fraction is normal at 60%  · Normal LV diastolic function.  · Mitral valve shows mild regurgitation.  · Tricuspid valve shows trace regurgitation.    Hemoglobin A1C   Date Value Ref Range Status   12/18/2019 8.5 (H) 4.0 - 5.6 % Final     Comment:     ADA Screening Guidelines:  5.7-6.4%  Consistent with prediabetes  >or=6.5%  Consistent with diabetes  High levels of fetal hemoglobin interfere with the HbA1C  assay. Heterozygous hemoglobin variants (HbS, HgC, etc)do  not significantly interfere with this assay.   However, presence of multiple variants may affect accuracy.     08/30/2019 9.0 (H) 4.0 - 5.6 % Final     Comment:     ADA Screening  Guidelines:  5.7-6.4%  Consistent with prediabetes  >or=6.5%  Consistent with diabetes  High levels of fetal hemoglobin interfere with the HbA1C  assay. Heterozygous hemoglobin variants (HbS, HgC, etc)do  not significantly interfere with this assay.   However, presence of multiple variants may affect accuracy.     05/21/2019 8.3 (H) 4.0 - 5.6 % Final     Comment:     ADA Screening Guidelines:  5.7-6.4%  Consistent with prediabetes  >or=6.5%  Consistent with diabetes  High levels of fetal hemoglobin interfere with the HbA1C  assay. Heterozygous hemoglobin variants (HbS, HgC, etc)do  not significantly interfere with this assay.   However, presence of multiple variants may affect accuracy.         Past Medical History: Patient has a past medical history of Anxiety, Arthritis, Depression, Diabetes mellitus type II, Diabetic peripheral neuropathy (9/10/2012), Facial spasm - right side (9/10/2012), History of irregular menstrual bleeding, Hypertension, and Knee pain.    Past Surgical History: Patient has a past surgical history that includes Tubal ligation; breast reduction; Colonoscopy (N/A, 11/16/2017); Total Reduction Mammoplasty; and Breast biopsy.    Social History: Patient reports that she quit smoking about 29 years ago. She has never used smokeless tobacco. She reports that she drinks alcohol. She reports that she does not use drugs.    Family History: family history includes Diabetes in her father; Heart disease in her mother; Hypertension in her mother.    Medications: reviewed     Allergies: Patient is allergic to pristiq  [desvenlafaxine].    ROS  Pain Scale: 9 /10 (during episode of chest pain)   Constitutional: no fever or chills  Respiratory: Positive for associated SOB  Cardiovascular: Positive for chest pain and palpitations  Gastrointestinal: no nausea or vomiting, no abdominal pain or change in bowel habits  Genitourinary: no hematuria or dysuria  Integument/Breast: no rash or  pruritis  Hematologic/Lymphatic: no easy bruising or lymphadenopathy  Musculoskeletal: no arthralgias or myalgias  Neurological: no seizures or tremors  Behavioral/Psych: no depression or anxiety    PEx  Temp:  [98.6 °F (37 °C)]   Pulse:  []   Resp:  [13-18]   BP: (131-177)/(67-80)   SpO2:  [95 %-97 %]   Body mass index is 30.67 kg/m².     Intake/Output Summary (Last 24 hours) at 12/22/2019 2203  Last data filed at 12/22/2019 2034  Gross per 24 hour   Intake 1000 ml   Output --   Net 1000 ml     General appearance: no distress, pt. Resting comfortably  Mental status: Alert and oriented x 3  HEENT:  conjunctivae/corneas clear, PERRL  Neck: supple, thyroid not enlarged  Pulm:   normal respiratory effort, CTA B, no c/w/r  Card: RRR, S1, S2 normal, no murmur, click, rub or gallop  Abd: soft, NT, ND, BS present; no masses, no organomegaly  Ext: no c/c/e  Pulses: 2+, symmetric  Skin: color, texture, turgor normal. No rashes or lesions  Neuro: CN II-XII grossly intact, no focal numbness or weakness, normal strength and tone     Recent Results (from the past 24 hour(s))   CBC auto differential    Collection Time: 12/22/19  5:04 PM   Result Value Ref Range    WBC 9.42 3.90 - 12.70 K/uL    RBC 4.84 4.00 - 5.40 M/uL    Hemoglobin 13.3 12.0 - 16.0 g/dL    Hematocrit 40.4 37.0 - 48.5 %    Mean Corpuscular Volume 84 82 - 98 fL    Mean Corpuscular Hemoglobin 27.5 27.0 - 31.0 pg    Mean Corpuscular Hemoglobin Conc 32.9 32.0 - 36.0 g/dL    RDW 13.0 11.5 - 14.5 %    Platelets 275 150 - 350 K/uL    MPV 11.2 9.2 - 12.9 fL    Immature Granulocytes 0.2 0.0 - 0.5 %    Gran # (ANC) 6.1 1.8 - 7.7 K/uL    Immature Grans (Abs) 0.02 0.00 - 0.04 K/uL    Lymph # 2.4 1.0 - 4.8 K/uL    Mono # 0.7 0.3 - 1.0 K/uL    Eos # 0.1 0.0 - 0.5 K/uL    Baso # 0.05 0.00 - 0.20 K/uL    nRBC 0 0 /100 WBC    Gran% 65.0 38.0 - 73.0 %    Lymph% 25.5 18.0 - 48.0 %    Mono% 7.3 4.0 - 15.0 %    Eosinophil% 1.5 0.0 - 8.0 %    Basophil% 0.5 0.0 - 1.9 %     Differential Method Automated    Comprehensive metabolic panel    Collection Time: 12/22/19  5:04 PM   Result Value Ref Range    Sodium 141 136 - 145 mmol/L    Potassium 3.6 3.5 - 5.1 mmol/L    Chloride 104 95 - 110 mmol/L    CO2 24 23 - 29 mmol/L    Glucose 160 (H) 70 - 110 mg/dL    BUN, Bld 30 (H) 6 - 20 mg/dL    Creatinine 1.6 (H) 0.5 - 1.4 mg/dL    Calcium 10.3 8.7 - 10.5 mg/dL    Total Protein 7.7 6.0 - 8.4 g/dL    Albumin 4.0 3.5 - 5.2 g/dL    Total Bilirubin 0.6 0.1 - 1.0 mg/dL    Alkaline Phosphatase 124 55 - 135 U/L    AST 28 10 - 40 U/L    ALT 27 10 - 44 U/L    Anion Gap 13 8 - 16 mmol/L    eGFR if African American 40.1 (A) >60 mL/min/1.73 m^2    eGFR if non  34.8 (A) >60 mL/min/1.73 m^2   Troponin I #1    Collection Time: 12/22/19  5:04 PM   Result Value Ref Range    Troponin I 0.017 0.000 - 0.026 ng/mL   B-Type natriuretic peptide (BNP)    Collection Time: 12/22/19  5:04 PM   Result Value Ref Range    BNP <10 0 - 99 pg/mL   TSH    Collection Time: 12/22/19  5:04 PM   Result Value Ref Range    TSH 2.209 0.400 - 4.000 uIU/mL   C-reactive protein    Collection Time: 12/22/19  5:04 PM   Result Value Ref Range    CRP 2.6 0.0 - 8.2 mg/L   Sedimentation rate    Collection Time: 12/22/19  5:04 PM   Result Value Ref Range    Sed Rate 38 (H) 0 - 36 mm/Hr   Procalcitonin    Collection Time: 12/22/19  5:34 PM   Result Value Ref Range    Procalcitonin 0.04 <0.25 ng/mL   Drug screen panel, emergency    Collection Time: 12/22/19  6:13 PM   Result Value Ref Range    Benzodiazepines Negative     Methadone metabolites Negative     Cocaine (Metab.) Negative     Opiate Scrn, Ur Negative     Barbiturate Screen, Ur Negative     Amphetamine Screen, Ur Negative     THC Negative     Phencyclidine Negative     Creatinine, Random Ur 119.0 15.0 - 325.0 mg/dL    Toxicology Information SEE COMMENT    Troponin I #2    Collection Time: 12/22/19  8:05 PM   Result Value Ref Range    Troponin I 0.028 (H) 0.000 - 0.026  ng/mL   Magnesium    Collection Time: 12/22/19  8:05 PM   Result Value Ref Range    Magnesium 1.6 1.6 - 2.6 mg/dL       No results for input(s): POCTGLUCOSE in the last 168 hours.    Active Hospital Problems    Diagnosis  POA    Chest pain [R07.9]  Yes      Resolved Hospital Problems   No resolved problems to display.         Assessment and Plan:  Chest Pain  -No previous cardiac history, but risk factors DM2, HTN, HLD  -EKG without new ST changes, QT is prolonged. Troponin initially negative with mild increase to 0.028. Repeat troponin ordered  -Chest pain concerning for cardiac origin, plan to order stress echo for tomorrow am assuming troponin remains negative  -NPO at midnight    HTN  -Continue home meds hydralazine, chlorthalidone  -Hold valsartan in setting of BEAR. Can increase hydralazine dose if needed while off valsartan to control BP    DM2  -Pt. On metformin, trulicity, toujeo 58 units  -SSI ordered, diabetic diet. Levemir starting at lower dose of 40 units since pt. Will be NPO    BEAR  -Cr noted to be 1.6 on admit, baseline normal (although 1.3 4 days prior to admit)  -2L IV NS given in ED, will hold off on further fluids, urine electrolytes ordered  -Repeat BMP daily    HLD  -Continue statin    DVT PPx: Heparin    Travis Henderson MD  Hospital Medicine Staff  [phone] pager

## 2019-12-23 NOTE — PLAN OF CARE
12/23/19 1000   Discharge Assessment   Assessment Type Discharge Planning Assessment   Confirmed/corrected address and phone number on facesheet? Yes   Assessment information obtained from? Patient;Medical Record   Expected Length of Stay (days) 1   Communicated expected length of stay with patient/caregiver yes   Prior to hospitilization cognitive status: Alert/Oriented   Prior to hospitalization functional status: Independent   Current cognitive status: Alert/Oriented   Current Functional Status: Independent   Lives With alone   Able to Return to Prior Arrangements yes   Is patient able to care for self after discharge? Yes   Patient's perception of discharge disposition home or selfcare   Readmission Within the Last 30 Days no previous admission in last 30 days   Patient currently being followed by outpatient case management? No   Patient currently receives any other outside agency services? No   Equipment Currently Used at Home CPAP;glucometer   Do you have any problems affording any of your prescribed medications? TBD   Is the patient taking medications as prescribed? yes   Does the patient have transportation home? Yes   Transportation Anticipated family or friend will provide   Does the patient receive services at the [facility]? No   Discharge Plan A Home   DME Needed Upon Discharge  none   Patient/Family in Agreement with Plan yes   Placed in Obs for CP. Lives alone and is independent in her ADLs. Plan is to DC home. No DC needs identified.

## 2019-12-23 NOTE — PROGRESS NOTES
Progress Note  Hospital Medicine    Provider team: [facility] [place] [facility]  Admit Date: 12/22/2019  Encounter Date: 12/23/2019     SUBJECTIVE:     Follow-up Visit for: Chest pain    HPI (See H&P for complete P,F,SHx):   60 yo F with PMHx HTN, DM2, and HLD was in her usual state of health until about 3 pm today when she woke up from a nap and started having chest pain and palpitations. The patient described the pain as a 9/10 pressure like midsternal pain with associated palpitations, SOB, and lightheadedness/dizziness. The patient states that the pain did not wake her up, but rather started just after she woke up. She denies any radiation, nausea/vomiting, or diaphoresis. She states that the pain lasted until she got to the hospital which was at least a couple of hours. On my interview, she reports some mild discomfort still present. She states that the SOB went away quickly, but the palpitations lasted until a few hours ago. She has never had chest pain like this in the past. She does report having palpitations occasionally in the middle of the night, but she did not seek medical attention until the pain occurred today.     TTE 10/2018  · Left ventricle ejection fraction is normal at 60%  · Normal LV diastolic function.  · Mitral valve shows mild regurgitation.  · Tricuspid valve shows trace regurgitation.    Interval history:  Patient with slight rise in Love. Cardiology is consulted and not advising as treating as ACS with plans for DSE on 12/24. Patient reports resolution of chest pain and no further palpitations. She has no SOB. Rest of ROS negative.    Review of Systems:  Review of Systems   Constitutional: Negative for chills and fever.   HENT: Negative for congestion and sore throat.    Eyes: Negative for photophobia, pain and discharge.   Respiratory: Negative for cough, hemoptysis, sputum production and shortness of breath.    Cardiovascular: Positive for chest pain and palpitations. Negative for leg swelling.  "  Gastrointestinal: Negative for abdominal pain, diarrhea, nausea and vomiting.   Genitourinary: Negative for dysuria and urgency.   Musculoskeletal: Negative for myalgias and neck pain.   Skin: Negative for itching and rash.   Neurological: Negative for sensory change, focal weakness and headaches.   Endo/Heme/Allergies: Negative for polydipsia. Does not bruise/bleed easily.   Psychiatric/Behavioral: Negative for depression and suicidal ideas.     OBJECTIVE:       Intake/Output Summary (Last 24 hours) at 12/23/2019 1009  Last data filed at 12/23/2019 0000  Gross per 24 hour   Intake 1240 ml   Output --   Net 1240 ml     Vital Signs Range (Last 24H):  Temp:  [98 °F (36.7 °C)-98.9 °F (37.2 °C)]   Pulse:  []   Resp:  [13-18]   BP: (120-177)/(67-80)   SpO2:  [92 %-97 %]   Body mass index is 30.67 kg/m².    Objective:  General Appearance:  Comfortable, well-appearing, in no acute distress and not in pain.    Vital signs: (most recent): Blood pressure (!) 147/92, pulse 87, temperature 98.5 °F (36.9 °C), temperature source Oral, resp. rate 18, height 5' 6" (1.676 m), weight 86.2 kg (190 lb 0.6 oz), last menstrual period 03/03/2016, SpO2 98 %, not currently breastfeeding.  No fever.    Output: Producing urine and producing stool.    HEENT: Normal HEENT exam.    Lungs:  Normal effort.  Breath sounds clear to auscultation.  She is not in respiratory distress.  No rales or wheezes.    Heart: Normal rate.  Regular rhythm.  S1 normal and S2 normal.  No murmur.   Chest: Symmetric chest wall expansion.   Abdomen: Abdomen is soft.  Bowel sounds are normal.   There is no abdominal tenderness.     Extremities: Normal range of motion.  There is no deformity, effusion, dependent edema or local swelling.    Pulses: Distal pulses are intact.    Neurological: Patient is alert and oriented to person, place and time.  Normal strength.    Pupils:  Pupils are equal, round, and reactive to light.    Skin:  Warm and dry.  "     Medications:  Medication list was reviewed in EPIC and changes noted under Assessment/Plan and MAR.    Laboratory:  Recent Labs     12/23/19  0436   WBC 7.72   RBC 4.59   HGB 12.4   HCT 39.5      MCV 86   MCH 27.0   MCHC 31.4*   GRAN 61.3  4.7   LYMPH 26.2  2.0   MONO 9.3  0.7   EOS 0.1      Recent Labs     12/22/19 2005 12/23/19  0436   GLU  --  162*   NA  --  141   K  --  4.1   CL  --  112*   CO2  --  21*   BUN  --  21*   CREATININE  --  0.9   CALCIUM  --  9.0   ANIONGAP  --  8   MG 1.6  --        ASSESSMENT/PLAN:     Active Hospital Problems    Diagnosis  POA    *Chest pain [R07.9]  Yes    BEAR (acute kidney injury) [N17.9]  Unknown    Mixed hyperlipidemia [E78.2]  Yes    Essential hypertension [I10]  Yes    Type 2 diabetes mellitus with diabetic polyneuropathy, with long-term current use of insulin [E11.42, Z79.4]  Not Applicable      Resolved Hospital Problems   No resolved problems to display.      Chest Pain  Palpitations  -No previous cardiac history, but risk factors DM2, HTN, HLD  -EKG without new ST changes, QT is prolonged.   -Troponin initially negative with mild increase 0.028 -> ~0.05  -Plan for DSE 12/24  -NPO at midnight  -Will prescribe 48h Holter upon d/c to further evaluate her palpitations     HTN  -Continue home meds hydralazine, chlorthalidone  -Hold valsartan in setting of BEAR.   -Can increase hydralazine dose if needed while off valsartan to control BP     DM2  -Patient on metformin, trulicity, toujeo 58 units  -SSI ordered, diabetic diet.   -Levemir starting at lower dose of 40 units since pt. Will be NPO     BEAR  -Cr noted to be 1.6 on admit, baseline normal (although 1.3 4 days prior to admit)  -2L IV NS given in ED, will hold off on further fluids, urine electrolytes ordered  -Repeat BMP daily     HLD  -Continue statin     DVT PPx: Heparin    Anticipated discharge date and disposition:   Pending cardiology evaluation.  Andrea Vargas MD  [facility]

## 2019-12-24 VITALS
WEIGHT: 190 LBS | SYSTOLIC BLOOD PRESSURE: 138 MMHG | DIASTOLIC BLOOD PRESSURE: 77 MMHG | HEIGHT: 66 IN | RESPIRATION RATE: 18 BRPM | TEMPERATURE: 98 F | BODY MASS INDEX: 30.53 KG/M2 | HEART RATE: 91 BPM | OXYGEN SATURATION: 95 %

## 2019-12-24 LAB
ANION GAP SERPL CALC-SCNC: 11 MMOL/L (ref 8–16)
ASCENDING AORTA: 2.73 CM
BASOPHILS # BLD AUTO: 0.1 K/UL (ref 0–0.2)
BASOPHILS NFR BLD: 1.7 % (ref 0–1.9)
BSA FOR ECHO PROCEDURE: 2 M2
BUN SERPL-MCNC: 16 MG/DL (ref 6–20)
CALCIUM SERPL-MCNC: 9.6 MG/DL (ref 8.7–10.5)
CHLORIDE SERPL-SCNC: 110 MMOL/L (ref 95–110)
CO2 SERPL-SCNC: 20 MMOL/L (ref 23–29)
CREAT SERPL-MCNC: 0.8 MG/DL (ref 0.5–1.4)
CV ECHO LV RWT: 0.44 CM
CV STRESS BASE HR: 87 BPM
DIASTOLIC BLOOD PRESSURE: 79 MMHG
DIFFERENTIAL METHOD: ABNORMAL
DOP CALC LVOT AREA: 2.9 CM2
DOP CALC LVOT DIAMETER: 1.92 CM
DOP CALC LVOT PEAK VEL: 1.44 M/S
DOP CALC LVOT STROKE VOLUME: 85.98 CM3
DOP CALCLVOT PEAK VEL VTI: 29.71 CM
E WAVE DECELERATION TIME: 198.74 MSEC
E/A RATIO: 1.2
E/E' RATIO: 15.38 M/S
ECHO LV POSTERIOR WALL: 0.96 CM (ref 0.6–1.1)
EOSINOPHIL # BLD AUTO: 0.2 K/UL (ref 0–0.5)
EOSINOPHIL NFR BLD: 3.8 % (ref 0–8)
ERYTHROCYTE [DISTWIDTH] IN BLOOD BY AUTOMATED COUNT: 13.2 % (ref 11.5–14.5)
EST. GFR  (AFRICAN AMERICAN): >60 ML/MIN/1.73 M^2
EST. GFR  (NON AFRICAN AMERICAN): >60 ML/MIN/1.73 M^2
FRACTIONAL SHORTENING: 36 % (ref 28–44)
GLUCOSE SERPL-MCNC: 123 MG/DL (ref 70–110)
HCT VFR BLD AUTO: 45 % (ref 37–48.5)
HGB BLD-MCNC: 13.4 G/DL (ref 12–16)
IMM GRANULOCYTES # BLD AUTO: 0.01 K/UL (ref 0–0.04)
IMM GRANULOCYTES NFR BLD AUTO: 0.2 % (ref 0–0.5)
INTERVENTRICULAR SEPTUM: 0.94 CM (ref 0.6–1.1)
IVRT: 0.07 MSEC
LA MAJOR: 4.51 CM
LA MINOR: 4.81 CM
LA WIDTH: 3.11 CM
LEFT ATRIUM SIZE: 4.05 CM
LEFT ATRIUM VOLUME INDEX: 25.5 ML/M2
LEFT ATRIUM VOLUME: 49.84 CM3
LEFT INTERNAL DIMENSION IN SYSTOLE: 2.77 CM (ref 2.1–4)
LEFT VENTRICLE DIASTOLIC VOLUME INDEX: 43.51 ML/M2
LEFT VENTRICLE DIASTOLIC VOLUME: 85.15 ML
LEFT VENTRICLE MASS INDEX: 69 G/M2
LEFT VENTRICLE SYSTOLIC VOLUME INDEX: 14.7 ML/M2
LEFT VENTRICLE SYSTOLIC VOLUME: 28.72 ML
LEFT VENTRICULAR INTERNAL DIMENSION IN DIASTOLE: 4.35 CM (ref 3.5–6)
LEFT VENTRICULAR MASS: 135.24 G
LV LATERAL E/E' RATIO: 14.29 M/S
LV SEPTAL E/E' RATIO: 16.67 M/S
LYMPHOCYTES # BLD AUTO: 1.9 K/UL (ref 1–4.8)
LYMPHOCYTES NFR BLD: 31.3 % (ref 18–48)
MCH RBC QN AUTO: 27.1 PG (ref 27–31)
MCHC RBC AUTO-ENTMCNC: 29.8 G/DL (ref 32–36)
MCV RBC AUTO: 91 FL (ref 82–98)
MONOCYTES # BLD AUTO: 0.6 K/UL (ref 0.3–1)
MONOCYTES NFR BLD: 9.7 % (ref 4–15)
MV PEAK A VEL: 0.83 M/S
MV PEAK E VEL: 1 M/S
NEUTROPHILS # BLD AUTO: 3.2 K/UL (ref 1.8–7.7)
NEUTROPHILS NFR BLD: 53.3 % (ref 38–73)
NRBC BLD-RTO: 0 /100 WBC
OHS CV CPX 1 MINUTE RECOVERY HEART RATE: 141 BPM
OHS CV CPX 85 PERCENT MAX PREDICTED HEART RATE MALE: 130
OHS CV CPX MAX PREDICTED HEART RATE: 153
OHS CV CPX PATIENT IS FEMALE: 1
OHS CV CPX PATIENT IS MALE: 0
OHS CV CPX PEAK DIASTOLIC BLOOD PRESSURE: 53 MMHG
OHS CV CPX PEAK HEAR RATE: 142 BPM
OHS CV CPX PEAK RATE PRESSURE PRODUCT: NORMAL
OHS CV CPX PEAK SYSTOLIC BLOOD PRESSURE: 216 MMHG
OHS CV CPX PERCENT MAX PREDICTED HEART RATE ACHIEVED: 93
OHS CV CPX RATE PRESSURE PRODUCT PRESENTING: NORMAL
PISA TR MAX VEL: 1.94 M/S
PLATELET # BLD AUTO: 214 K/UL (ref 150–350)
PMV BLD AUTO: 11.9 FL (ref 9.2–12.9)
POCT GLUCOSE: 153 MG/DL (ref 70–110)
POCT GLUCOSE: 237 MG/DL (ref 70–110)
POTASSIUM SERPL-SCNC: 4.3 MMOL/L (ref 3.5–5.1)
PULM VEIN S/D RATIO: 1.25
PV PEAK D VEL: 0.32 M/S
PV PEAK S VEL: 0.4 M/S
RA MAJOR: 4.13 CM
RA PRESSURE: 3 MMHG
RA WIDTH: 3.11 CM
RBC # BLD AUTO: 4.94 M/UL (ref 4–5.4)
RIGHT VENTRICULAR END-DIASTOLIC DIMENSION: 2.72 CM
RV TISSUE DOPPLER FREE WALL SYSTOLIC VELOCITY 1 (APICAL 4 CHAMBER VIEW): 17.47 CM/S
SINUS: 3 CM
SODIUM SERPL-SCNC: 141 MMOL/L (ref 136–145)
STJ: 2.35 CM
SYSTOLIC BLOOD PRESSURE: 161 MMHG
TDI LATERAL: 0.07 M/S
TDI SEPTAL: 0.06 M/S
TDI: 0.07 M/S
TR MAX PG: 15 MMHG
TRICUSPID ANNULAR PLANE SYSTOLIC EXCURSION: 2.3 CM
TV REST PULMONARY ARTERY PRESSURE: 18 MMHG
WBC # BLD AUTO: 6 K/UL (ref 3.9–12.7)

## 2019-12-24 PROCEDURE — 36415 COLL VENOUS BLD VENIPUNCTURE: CPT

## 2019-12-24 PROCEDURE — 85025 COMPLETE CBC W/AUTO DIFF WBC: CPT

## 2019-12-24 PROCEDURE — 96372 THER/PROPH/DIAG INJ SC/IM: CPT

## 2019-12-24 PROCEDURE — 82962 GLUCOSE BLOOD TEST: CPT

## 2019-12-24 PROCEDURE — 25000003 PHARM REV CODE 250: Performed by: HOSPITALIST

## 2019-12-24 PROCEDURE — 63600150 PHARM REV CODE 636: Performed by: HOSPITALIST

## 2019-12-24 PROCEDURE — G0378 HOSPITAL OBSERVATION PER HR: HCPCS

## 2019-12-24 PROCEDURE — 80048 BASIC METABOLIC PNL TOTAL CA: CPT

## 2019-12-24 PROCEDURE — 99217 PR OBSERVATION CARE DISCHARGE: CPT | Mod: ,,, | Performed by: HOSPITALIST

## 2019-12-24 PROCEDURE — 99217 PR OBSERVATION CARE DISCHARGE: ICD-10-PCS | Mod: ,,, | Performed by: HOSPITALIST

## 2019-12-24 RX ORDER — METOPROLOL SUCCINATE 25 MG/1
25 TABLET, EXTENDED RELEASE ORAL DAILY
Qty: 30 TABLET | Refills: 11 | Status: SHIPPED | OUTPATIENT
Start: 2019-12-24 | End: 2020-12-22 | Stop reason: SDUPTHER

## 2019-12-24 RX ORDER — NITROGLYCERIN 0.4 MG/1
0.4 TABLET SUBLINGUAL EVERY 5 MIN PRN
Qty: 25 TABLET | Refills: 0 | Status: SHIPPED | OUTPATIENT
Start: 2019-12-24

## 2019-12-24 RX ORDER — DOBUTAMINE HYDROCHLORIDE 100 MG/100ML
20 INJECTION INTRAVENOUS
Status: COMPLETED | OUTPATIENT
Start: 2019-12-24 | End: 2019-12-24

## 2019-12-24 RX ADMIN — HYDRALAZINE HYDROCHLORIDE 25 MG: 25 TABLET ORAL at 10:12

## 2019-12-24 RX ADMIN — DOBUTAMINE IN DEXTROSE 20 MCG/KG/MIN: 100 INJECTION, SOLUTION INTRAVENOUS at 08:12

## 2019-12-24 RX ADMIN — CHLORTHALIDONE 25 MG: 25 TABLET ORAL at 10:12

## 2019-12-24 RX ADMIN — INSULIN DETEMIR 40 UNITS: 100 INJECTION, SOLUTION SUBCUTANEOUS at 10:12

## 2019-12-24 RX ADMIN — VALSARTAN 320 MG: 80 TABLET, FILM COATED ORAL at 10:12

## 2019-12-24 RX ADMIN — ASPIRIN 81 MG: 81 TABLET, DELAYED RELEASE ORAL at 09:12

## 2019-12-24 NOTE — PLAN OF CARE
Safety measures maintained. VSS on RA. Pt denies any pain. Bed in lowest position, call light within reach, side rails up x2. Pt has no complaints at this time. Will continue to monitor.

## 2019-12-24 NOTE — PLAN OF CARE
Patient given discharge paperwork, explained care and informed monitor will be mailed to home per device department/MD. Patient verbalized understanding. VSS, patient stated no chest pain or discomfort and feeling better. IVs removed, catheter intact with no complications. Patient discharged with son.

## 2019-12-24 NOTE — PLAN OF CARE
Pt remains neurologically intact and ambulates independently. PIV intact, no complaints of pain at this time. Tele and continuous pulse ox in place. Supplemental 02 at bedside. Pt aware NPO at midnight and will undergo stress testing on tomorrow. Following all safety precautions at this time with call light in reach.

## 2019-12-24 NOTE — NURSING NOTE
Patient verified by 2 identifiers and allergies reviewed.  20g IV in place to Rt FA.  DSE explained to patient, consent obtained & testing completed.  Pt C/O 9/10 chest discomfort & HA during peak testing which resolved during the recovery period. IV flushed post testing.  Post study discharge instructions reviewed with patient, patient verbalized understanding.  Patient transferred back to room via stretcher accompanied by escort  in stable condition.

## 2019-12-24 NOTE — DISCHARGE SUMMARY
4Discharge Summary  Hospital Medicine    Attending Provider on Discharge: Andrea Vargas     Discharging Team:    [facility]  [facility] [place] [facility] - CARDIOLOGY CONSULT STEPDOWN     Date of Admission:  12/22/2019         Date of Discharge:  12/24/2019      Diagnoses:     Principal Problem(s):   Chest pain     Secondary Problems:  Active Hospital Problems    Diagnosis    *Chest pain    Palpitations    BEAR (acute kidney injury)    Mixed hyperlipidemia    Essential hypertension    Type 2 diabetes mellitus with diabetic polyneuropathy, with long-term current use of insulin        Hospital Course:    HPI (See H&P for complete P,F,SHx):   60 yo F with PMHx HTN, DM2, and HLD was in her usual state of health until about 3 pm today when she woke up from a nap and started having chest pain and palpitations. The patient described the pain as a 9/10 pressure like midsternal pain with associated palpitations, SOB, and lightheadedness/dizziness. The patient states that the pain did not wake her up, but rather started just after she woke up. She denies any radiation, nausea/vomiting, or diaphoresis. She states that the pain lasted until she got to the hospital which was at least a couple of hours. On my interview, she reports some mild discomfort still present. She states that the SOB went away quickly, but the palpitations lasted until a few hours ago. She has never had chest pain like this in the past. She does report having palpitations occasionally in the middle of the night, but she did not seek medical attention until the pain occurred today.     DSE 12/24/2019  · The patient reached the end of the protocol.  · There were no arrhythmias during stress.  · The EKG portion of this study is negative for myocardial ischemia.  · Normal left ventricular systolic function. The estimated ejection fraction is 63%  · No wall motion abnormalities.  · Concentric left ventricular remodeling.  · Indeterminate left ventricular  diastolic function.  · Normal right ventricular systolic function.  · Normal central venous pressure (3 mm Hg).  · The estimated PA systolic pressure is 18 mm Hg  · Trivial posterolateral pericardial effusion.  · The stress ECG and echo portions of this study were negative for myocardial ischemia despite the 9/10 chest pain reported.     Interval history:  Patient with slight rise in Love. Cardiology is consulted and not advising as treating as ACS with plans for DSE on 12/24. DSE was negative as above.  She is planned for 48 hour holter monitor upon d/c to r/o tachyarrhythmia. I saw with patient and explained over 15 minutes the results and interpretation. I advised the test is not 100% sensitive, and her symptoms of pain may in fact be due to coronary ischemia. I advised that we did not have evidence to support a diagnosis of acute coronary syndrome. Furthermore, I think it is important to c/w ASA/statin/BB and f/u in cardiology clinic to further evaluate symptoms on this regimen. On 12/24, patient reports resolution of chest pain and no further palpitations.  She is deemed stable for discharge.  All questions answered to patient satisfaction. Please see below for further details:    Review of Systems   Constitutional: Negative for chills and fever.   HENT: Negative for congestion and sore throat.    Eyes: Negative for photophobia, pain and discharge.   Respiratory: Negative for cough, hemoptysis, sputum production and shortness of breath.    Cardiovascular: Negative for chest pain, palpitations and leg swelling.   Gastrointestinal: Negative for abdominal pain, diarrhea, nausea and vomiting.   Genitourinary: Negative for dysuria and urgency.   Musculoskeletal: Negative for myalgias and neck pain.   Skin: Negative for itching and rash.   Neurological: Negative for sensory change, focal weakness and headaches.   Endo/Heme/Allergies: Negative for polydipsia. Does not bruise/bleed easily.   Psychiatric/Behavioral:  Negative for depression and suicidal ideas.     Physical Exam   Constitutional: She is oriented to person, place, and time. No distress.   HENT:   Head: Normocephalic and atraumatic.   Eyes: Pupils are equal, round, and reactive to light. Conjunctivae are normal.   Neck: Normal range of motion. Neck supple. No JVD present. No tracheal deviation present.   Cardiovascular: Normal rate, regular rhythm, normal heart sounds and intact distal pulses.   No murmur heard.  Pulmonary/Chest: Effort normal and breath sounds normal. No respiratory distress. She has no wheezes.   Abdominal: Soft. Bowel sounds are normal. She exhibits no distension. There is no tenderness.   Musculoskeletal: Normal range of motion. She exhibits no edema or tenderness.   Neurological: She is alert and oriented to person, place, and time. She exhibits normal muscle tone.   Skin: Skin is warm and dry. She is not diaphoretic. No erythema.     Chest Pain  Palpitations  -No previous cardiac history, but risk factors DM2, HTN, HLD  -EKG without new ST changes, QT is prolonged.   -Troponin initially negative with mild increase 0.028 -> ~0.05  -DSE negative  -F/u cardiology after GDMT to evaluate symptoms; ambulatory referral placed  -Will prescribe 48h Holter upon d/c to further evaluate her palpitations     HTN, essential  -Continue home meds hydralazine, chlorthalidone  -Can resume valsartan     DM2, without complication, long term use insulin  -Patient on metformin, trulicity, toujeo 58 units  -SSI ordered, diabetic diet.   -Resume home regimen upon d/c     BEAR  -Cr noted to be 1.6 on admit, baseline normal (although 1.3 4 days prior to admit)  -2L IV NS given in ED, will hold off on further fluids, urine electrolytes ordered  -Resolved     HLD, mixed  -Continue statin    Significant Diagnostic Studies:   Labs:   Recent Labs     12/24/19  0424   WBC 6.00   RBC 4.94   HGB 13.4   HCT 45.0      MCV 91   MCH 27.1   MCHC 29.8*   GRAN 53.3  3.2    LYMPH 31.3  1.9   MONO 9.7  0.6   EOS 0.2      Recent Labs     12/22/19 2005 12/24/19  0424   GLU  --    < > 123*   NA  --    < > 141   K  --    < > 4.3   CL  --    < > 110   CO2  --    < > 20*   BUN  --    < > 16   CREATININE  --    < > 0.8   CALCIUM  --    < > 9.6   ANIONGAP  --    < > 11   MG 1.6  --   --     < > = values in this interval not displayed.     Radiology:   Results for orders placed during the hospital encounter of 09/24/18   X-Ray Chest 1 View    Narrative EXAMINATION:  XR CHEST 1 VIEW    CLINICAL HISTORY:  Shortness of breath    TECHNIQUE:  Single frontal view of the chest was performed.    COMPARISON:  01/15/2018    FINDINGS:  The cardiomediastinal silhouette is not enlarged, noting calcification of the aorta..  There is no pleural effusion.  The trachea is midline.  The lungs are symmetrically expanded bilaterally without evidence of acute parenchymal process. No large focal consolidation seen.  There is mild bilateral basilar subsegmental atelectasis.  There is no pneumothorax.  The osseous structures are remarkable for degenerative changes..      Impression 1. No acute cardiopulmonary process, hypoventilatory exam.      Electronically signed by: Tomas Josue MD  Date:    09/24/2018  Time:    12:38      Results for orders placed during the hospital encounter of 12/22/19   X-Ray Chest PA And Lateral    Narrative EXAMINATION:  XR CHEST PA AND LATERAL    CLINICAL HISTORY:  Chest Pain;    TECHNIQUE:  PA and lateral views of the chest were performed.    COMPARISON:  Chest radiograph 09/24/2018    FINDINGS:  Monitoring leads overlie the chest.  No detrimental change.The lungs are clear, with normal appearance of pulmonary vasculature and no pleural effusion or pneumothorax.    The cardiac silhouette is normal in size. The hilar and mediastinal contours are within normal limits noting calcific atherosclerosis of the arch.    Osseous structures appear stable without acute process seen.       Impression No detrimental change or radiographic acute intrathoracic process seen.      Electronically signed by: Macho Anderson MD  Date:    12/22/2019  Time:    17:31      Results for orders placed during the hospital encounter of 02/20/16   CT Abdomen Pelvis With Contrast    Narrative Time of Procedure: 02/20/16 10:32:06  Accession # [id number]    CT OF THE ABDOMEN & PELVIS WITH IV CONTRAST:       Technique: CT examination of the abdomen and pelvis was obtained using 5 mm axial images after the administration of 100 cc of Omnipaque 350 IV .  No PO Contrast.  Portal venous and delayed phase images were obtained.  Coronal and sagittal reformats were obtained.    Comparison: CT abdomen pelvis from 10/1/2008     Clinical Indication:  Abdominal pain.    Results:    Visualized portions of the lungs demonstrate mild amount of atelectasis.  No cardiomegaly.  No pericardial effusion.    Liver demonstrates relative hypoattenuation suggestive of hepatic steatosis.  1.2-cm hypodensity in the posterior aspect of the right hepatic lobe near the dome (series 2, image 16).  1.2-cm hypodense lesion in the left hepatic lobe (series two, image 13).  These are unchanged when compared to prior exam.  Gallbladder is normal in appearance.  No evidence of intra-or extrahepatic biliary ductal dilatation.  Portal vein is patent.  Spleen, stomach, duodenum, pancreas, and adrenal glands are normal in appearance.  Kidneys concentrate and excrete contrast material satisfactorily.  No hydronephrosis.  Urinary bladder is normal in appearance.  Uterus demonstrates no significant abnormalities.  Left ovary demonstrates a 2.1-cm area of peripheral enhancement.  This may represent an exophytic fibroid versus ovarian lesion.  Further evaluation with ultrasound is recommended.    Small bowel is normal in caliber.  No evidence of obstruction.  Normal appendix.  The colon demonstrates no focal wall thickening.    Abdominal aorta tapers appropriately.    No  evidence of lymphadenopathy.    Fat-containing ventral hernia.  Mild soft tissue thickening of the left anterior lower abdominal wall which may be secondary to injection versus mild cellulitis.    Osseous structures: No suspicious osseous lesions.    Impression      1.  No acute process in the abdomen or pelvis.    2.  2.0-cm peripherally enhancing hypodensity in the left ovary which may represent a small cyst, however further evaluation with ultrasound is recommended.    3.  Two stable hepatic hypodensities.    4.  Findings suggestive of hepatic steatosis.    5.  Mild soft tissue thickening along the subcutaneous tissue of the left lower abdominal wall which may be secondary to injection versus mild cellulitis.      Electronically signed by: KAYE SCHULTE MD  Date:     02/20/16  Time:    11:02       Cardiac Studies: DSE 12/24/19    Significant Treatments/Procedures:   DSE 12/24/19    Consults while in Hospital:   Cardiology    Discharge Medications:      Current Discharge Medication List      START taking these medications    Details   metoprolol succinate (TOPROL-XL) 25 MG 24 hr tablet Take 1 tablet (25 mg total) by mouth once daily.  Qty: 30 tablet, Refills: 11    Associated Diagnoses: Essential hypertension      nitroGLYCERIN (NITROSTAT) 0.4 MG SL tablet Place 1 tablet (0.4 mg total) under the tongue every 5 (five) minutes as needed for Chest pain.  Qty: 28 tablet, Refills: 0         CONTINUE these medications which have NOT CHANGED    Details   aspirin (ADULT ASPIRIN EC LOW STRENGTH) 81 MG EC tablet Take 81 mg by mouth. 1 Tablet, Delayed Release (E.C.) Oral Every day      atorvastatin (LIPITOR) 40 MG tablet Take 1 tablet (40 mg total) by mouth every evening.  Qty: 90 tablet, Refills: 3      blood sugar diagnostic (BLOOD GLUCOSE TEST) Strp One touch  Qty: 100 strip, Refills: 11    Associated Diagnoses: Type II or unspecified type diabetes mellitus without mention of complication, uncontrolled     "  chlorthalidone (HYGROTEN) 25 MG Tab Take 1 tablet (25 mg total) by mouth once daily. (replaces hydrochlorothiazide for BP)  Qty: 30 tablet, Refills: 5    Associated Diagnoses: Essential hypertension      clonazePAM (KLONOPIN) 0.5 MG tablet Take 1 tablet (0.5 mg total) by mouth 2 (two) times daily as needed for Anxiety.  Qty: 60 tablet, Refills: 2    Associated Diagnoses: Depression with anxiety      diabetic supplies, miscellan. Kit Please change sensor every 14 days. FreeStyle Malka Sensor 28-day supply (2 sensors/month)  Qty: 2 kit, Refills: 5      diclofenac sodium (VOLTAREN) 1 % Gel Apply 2 g topically 2 (two) times daily.  Qty: 200 g, Refills: 5    Associated Diagnoses: Primary osteoarthritis of both knees      dulaglutide (TRULICITY) 1.5 mg/0.5 mL PnIj Inject 1.5 mg into the skin every 7 days.  Qty: 12 Syringe, Refills: 1      FREESTYLE MALKA 14 DAY SENSOR Kit PLEASE CHANGE SENSOR EVERY 14 DAYS.  Refills: 5      hydrALAZINE (APRESOLINE) 25 MG tablet Take 1 tablet (25 mg total) by mouth 3 (three) times daily.  Qty: 90 tablet, Refills: 2    Associated Diagnoses: Essential hypertension      insulin glargine, TOUJEO, (TOUJEO SOLOSTAR U-300 INSULIN) 300 unit/mL (1.5 mL) InPn pen INJECT 58 UNITS SUBCUTANEOUSLY ONCE DAILY  Qty: 12 Syringe, Refills: 1    Comments: Please consider 90 day supplies to promote better adherence      insulin lispro (HUMALOG KWIKPEN INSULIN) 100 unit/mL InPn pen Inject each meal plus scale 150-200 +2, 201-250 +4, 251-300 +6, 301-350 +8, >350 +10.  Qty: 3 Box, Refills: 2    Associated Diagnoses: Type 2 diabetes mellitus with diabetic polyneuropathy, with long-term current use of insulin      insulin syringe-needle U-100 1/2 mL 30 x 5/16" Syrg       lancets (MICROLET LANCET) Misc Inject 1 each into the skin 3 (three) times daily.  Qty: 100 each, Refills: 11    Associated Diagnoses: Type II or unspecified type diabetes mellitus with neurological manifestations, uncontrolled(250.62)    " "  metFORMIN (GLUCOPHAGE-XR) 500 MG 24 hr tablet Take 2 tablets (1,000 mg total) by mouth once daily.  Qty: 180 tablet, Refills: 2      multivitamin (MULTIVITAMIN) per tablet Take 1 tablet by mouth once daily.        pen needle, diabetic (PEN NEEDLE) 31 gauge x 5/16" Ndle USE ONE  SUBCUTANEOUSLY 4 TIMES DAILY  Qty: 150 each, Refills: 6    Associated Diagnoses: Type 2 diabetes mellitus with diabetic polyneuropathy, with long-term current use of insulin      valsartan (DIOVAN) 320 MG tablet Take 320 mg by mouth once daily.      vitamin E 400 UNIT capsule Take 400 Units by mouth once daily.        zolpidem (AMBIEN) 5 MG Tab Take 1 tablet (5 mg total) by mouth every evening.  Qty: 30 tablet, Refills: 5    Associated Diagnoses: Insomnia due to stress      blood-glucose meter kit Use as instructed  Qty: 1 each, Refills: 11    Comments: One Touch  Associated Diagnoses: Type II or unspecified type diabetes mellitus without mention of complication, uncontrolled         STOP taking these medications       ibuprofen (ADVIL,MOTRIN) 800 MG tablet Comments:   Reason for Stopping:         NIFEdipine (PROCARDIA-XL) 60 MG (OSM) 24 hr tablet Comments:   Reason for Stopping:             Discharge Diet:cardiac diet with Normal Fluid intake of 1500 - 2000 mL per day    Activity: activity as tolerated    Discharged Condition: Stable    Discharge Disposition: Home or Self Care    Follow-up:   Future Appointments   Date Time Provider Department Center   1/16/2020  8:40 AM Opal Strange MD [facility] CARDIO [place] [facility]   1/28/2020  7:30 AM Daxa Hoyos NP [facility] -      Studies/Results pending on discharge:   None    Andrea Vargas MD  Hospital Medicine  "

## 2019-12-24 NOTE — PLAN OF CARE
12/24/2019      Cindy Billingsley  [address]  [place] [facility] [address]          Hospital Medicine Dept.  [facility] [address] [id number] [phone] after hours  [phone] fax Cindy Billingsley has been hospitalized at the [facility] since 12/22/2019.  Please excuse the patient from duties.  Patient may return on 12/30/2019.  No restrictions.     Please contact me if you have any questions.                  __________________________  Andrea Vargas MD  12/24/2019

## 2019-12-26 ENCOUNTER — CLINICAL SUPPORT (OUTPATIENT)
Dept: CARDIOLOGY | Facility: HOSPITAL | Age: 59
End: 2019-12-26
Attending: HOSPITALIST
Payer: COMMERCIAL

## 2019-12-26 DIAGNOSIS — R07.9 CHEST PAIN: ICD-10-CM

## 2019-12-26 PROCEDURE — 93227 XTRNL ECG REC<48 HR R&I: CPT | Mod: ,,, | Performed by: INTERNAL MEDICINE

## 2019-12-26 PROCEDURE — 93227 HOLTER MONITOR - 48 HOUR (CUPID ONLY): ICD-10-PCS | Mod: ,,, | Performed by: INTERNAL MEDICINE

## 2019-12-26 PROCEDURE — 93225 XTRNL ECG REC<48 HRS REC: CPT

## 2019-12-26 NOTE — PROGRESS NOTES
Digital Medicine: Health  Follow-Up    Patient reports she was admitted to the hospital for 2 days.  Reports she is feeling better, but still has a racing heart beat every now and then.  States she has an appt today to have a holter monitor placed for 48 hours to detect arrhythmias.     The history is provided by the patient.       INTERVENTION(S)  encouragement/support    PLAN  patient verbalizes understanding      There are no preventive care reminders to display for this patient.    Last 5 Patient Entered Readings                                      Current 30 Day Average: 152/87     Recent Readings 12/26/2019 12/25/2019 12/24/2019 12/23/2019 12/22/2019    SBP (mmHg) 140 159 124 138 138    DBP (mmHg) 88 88 69 73 73    Pulse 89 94 83 99 99                  Screenings    SDOH

## 2019-12-30 LAB
OHS CV EVENT MONITOR DAY: 0
OHS CV HOLTER LENGTH DECIMAL HOURS: 48
OHS CV HOLTER LENGTH HOURS: 48
OHS CV HOLTER LENGTH MINUTES: 0

## 2020-01-02 ENCOUNTER — PATIENT OUTREACH (OUTPATIENT)
Dept: OTHER | Facility: OTHER | Age: 60
End: 2020-01-02

## 2020-01-02 NOTE — PROGRESS NOTES
Left voicemail requesting call back  Nifedipine 60 mg changed to hydralazine 25 mg TID at 12/18/19 PCP appointment  Patient recently admitted for chest pain, palpitations. Discharged 12/24/19. Per chart, she was advised to resume home BP medications. BEAR noted and resolved with 2 L NS. Noted that she could resume valsartan.  BP trending down since PCP appointment.  BP average 150/86 mmHg  Cardiology follow up appointment 1/16/20    Hypertension Medications             chlorthalidone (HYGROTEN) 25 MG Tab Take 1 tablet (25 mg total) by mouth once daily. (replaces hydrochlorothiazide for BP)    hydrALAZINE (APRESOLINE) 25 MG tablet Take 1 tablet (25 mg total) by mouth 3 (three) times daily.    metoprolol succinate (TOPROL-XL) 25 MG 24 hr tablet Take 1 tablet (25 mg total) by mouth once daily.    nitroGLYCERIN (NITROSTAT) 0.4 MG SL tablet Place 1 tablet (0.4 mg total) under the tongue every 5 (five) minutes as needed for Chest pain.    valsartan (DIOVAN) 320 MG tablet Take 320 mg by mouth once daily.

## 2020-01-06 DIAGNOSIS — Z79.4 TYPE 2 DIABETES MELLITUS WITH DIABETIC POLYNEUROPATHY, WITH LONG-TERM CURRENT USE OF INSULIN: ICD-10-CM

## 2020-01-06 DIAGNOSIS — E11.42 TYPE 2 DIABETES MELLITUS WITH DIABETIC POLYNEUROPATHY, WITH LONG-TERM CURRENT USE OF INSULIN: ICD-10-CM

## 2020-01-06 RX ORDER — INSULIN GLARGINE 300 U/ML
INJECTION, SOLUTION SUBCUTANEOUS
Qty: 6 SYRINGE | Refills: 5 | Status: SHIPPED | OUTPATIENT
Start: 2020-01-06 | End: 2020-01-06 | Stop reason: SDUPTHER

## 2020-01-06 RX ORDER — INSULIN LISPRO 100 [IU]/ML
INJECTION, SOLUTION INTRAVENOUS; SUBCUTANEOUS
Qty: 45 ML | Refills: 0 | OUTPATIENT
Start: 2020-01-06

## 2020-01-06 RX ORDER — INSULIN LISPRO 100 [IU]/ML
INJECTION, SOLUTION INTRAVENOUS; SUBCUTANEOUS
Qty: 3 BOX | Refills: 1 | Status: SHIPPED | OUTPATIENT
Start: 2020-01-06 | End: 2020-03-11 | Stop reason: SDUPTHER

## 2020-01-06 RX ORDER — INSULIN GLARGINE 300 [IU]/ML
INJECTION, SOLUTION SUBCUTANEOUS
Qty: 12 SYRINGE | Refills: 1 | Status: SHIPPED | OUTPATIENT
Start: 2020-01-06 | End: 2020-02-19 | Stop reason: CLARIF

## 2020-01-06 RX ORDER — INSULIN LISPRO 100 [IU]/ML
INJECTION, SOLUTION INTRAVENOUS; SUBCUTANEOUS
Qty: 3 BOX | Refills: 2 | Status: CANCELLED
Start: 2020-01-06

## 2020-01-08 RX ORDER — VALSARTAN 320 MG/1
TABLET ORAL
Qty: 90 TABLET | Refills: 1 | Status: SHIPPED | OUTPATIENT
Start: 2020-01-08 | End: 2020-08-08 | Stop reason: SDUPTHER

## 2020-01-08 RX ORDER — METFORMIN HYDROCHLORIDE 500 MG/1
TABLET, EXTENDED RELEASE ORAL
Qty: 180 TABLET | Refills: 0 | Status: SHIPPED | OUTPATIENT
Start: 2020-01-08 | End: 2020-05-06 | Stop reason: SDUPTHER

## 2020-01-08 RX ORDER — METFORMIN HYDROCHLORIDE 500 MG/1
1000 TABLET, EXTENDED RELEASE ORAL DAILY
Qty: 180 TABLET | Refills: 0 | OUTPATIENT
Start: 2020-01-08

## 2020-01-20 NOTE — PROGRESS NOTES
Left voicemail requesting call back  Nifedipine 60 mg changed to hydralazine 25 mg TID at 12/18/19 PCP appointment  Patient recently admitted for chest pain, palpitations. Discharged 12/24/19. Per chart, she was advised to resume home BP medications. BEAR noted and resolved with 2 L NS. Noted that she could resume valsartan.  BP trending down since PCP appointment.  BP average 139/80 mmHg  Cardiology follow up appointment 1/16/20 was cancelled    Hypertension Medications             chlorthalidone (HYGROTEN) 25 MG Tab Take 1 tablet (25 mg total) by mouth once daily. (replaces hydrochlorothiazide for BP)    hydrALAZINE (APRESOLINE) 25 MG tablet Take 1 tablet (25 mg total) by mouth 3 (three) times daily.    metoprolol succinate (TOPROL-XL) 25 MG 24 hr tablet Take 1 tablet (25 mg total) by mouth once daily.    nitroGLYCERIN (NITROSTAT) 0.4 MG SL tablet Place 1 tablet (0.4 mg total) under the tongue every 5 (five) minutes as needed for Chest pain.    valsartan (DIOVAN) 320 MG tablet TAKE 1 TABLET BY MOUTH ONCE DAILY

## 2020-02-17 NOTE — PROGRESS NOTES
Patient answered and states she will call me back  BP continues to improve, average 137/79 mmHg  Recommend repeat BMP due to prior BEAR    Hypertension Medications             chlorthalidone (HYGROTEN) 25 MG Tab Take 1 tablet (25 mg total) by mouth once daily. (replaces hydrochlorothiazide for BP)    hydrALAZINE (APRESOLINE) 25 MG tablet Take 1 tablet (25 mg total) by mouth 3 (three) times daily.    metoprolol succinate (TOPROL-XL) 25 MG 24 hr tablet Take 1 tablet (25 mg total) by mouth once daily.    nitroGLYCERIN (NITROSTAT) 0.4 MG SL tablet Place 1 tablet (0.4 mg total) under the tongue every 5 (five) minutes as needed for Chest pain.    valsartan (DIOVAN) 320 MG tablet TAKE 1 TABLET BY MOUTH ONCE DAILY

## 2020-02-18 ENCOUNTER — PATIENT MESSAGE (OUTPATIENT)
Dept: ADMINISTRATIVE | Facility: OTHER | Age: 60
End: 2020-02-18

## 2020-02-19 ENCOUNTER — TELEPHONE (OUTPATIENT)
Dept: ENDOCRINOLOGY | Facility: CLINIC | Age: 60
End: 2020-02-19

## 2020-02-19 RX ORDER — INSULIN DEGLUDEC 200 U/ML
INJECTION, SOLUTION SUBCUTANEOUS
Qty: 3 SYRINGE | Refills: 1 | Status: SHIPPED | OUTPATIENT
Start: 2020-02-19 | End: 2020-03-19

## 2020-02-19 NOTE — TELEPHONE ENCOUNTER
Spoke with pt and informed her that Maurice needs a PA so Tresiba has been sent. The pt stated she no longer uses [facility] only [facility] due to being with [facility] now.

## 2020-02-20 ENCOUNTER — TELEPHONE (OUTPATIENT)
Dept: FAMILY MEDICINE | Facility: CLINIC | Age: 60
End: 2020-02-20

## 2020-02-21 ENCOUNTER — TELEPHONE (OUTPATIENT)
Dept: FAMILY MEDICINE | Facility: CLINIC | Age: 60
End: 2020-02-21

## 2020-02-21 NOTE — TELEPHONE ENCOUNTER
Patient checking on status of Valsartan is on backorder and if Dr. Lombard called in a new rx. Please advise patient

## 2020-02-24 ENCOUNTER — TELEPHONE (OUTPATIENT)
Dept: FAMILY MEDICINE | Facility: CLINIC | Age: 60
End: 2020-02-24

## 2020-02-24 DIAGNOSIS — I10 ESSENTIAL HYPERTENSION: Primary | ICD-10-CM

## 2020-02-24 NOTE — TELEPHONE ENCOUNTER
----- Message from Deysi Indio sent at 2/24/2020 11:44 AM CST -----  Contact: pt  Type: Patient Call Back    Who called:pt    What is the request in detail:pt is calling to get alternative for valsartan. It is on backorder. Call pt    Can the clinic reply by [facility]?    Would the patient rather a call back or a response via [facility]? call    Best call back number:[phone]    Additional Information:

## 2020-02-27 RX ORDER — OLMESARTAN MEDOXOMIL 40 MG/1
40 TABLET ORAL DAILY
Qty: 90 TABLET | Refills: 0 | Status: SHIPPED | OUTPATIENT
Start: 2020-02-27 | End: 2020-04-20

## 2020-02-27 NOTE — TELEPHONE ENCOUNTER
.Informed pt of medication alternative being sent to pharmacy. Verbalizes understanding. But states that she was able to get Valsartan.

## 2020-03-11 DIAGNOSIS — E11.42 TYPE 2 DIABETES MELLITUS WITH DIABETIC POLYNEUROPATHY, WITH LONG-TERM CURRENT USE OF INSULIN: ICD-10-CM

## 2020-03-11 DIAGNOSIS — Z79.4 TYPE 2 DIABETES MELLITUS WITH DIABETIC POLYNEUROPATHY, WITH LONG-TERM CURRENT USE OF INSULIN: ICD-10-CM

## 2020-03-11 RX ORDER — INSULIN LISPRO 100 [IU]/ML
INJECTION, SOLUTION INTRAVENOUS; SUBCUTANEOUS
Qty: 1 BOX | Refills: 1 | Status: SHIPPED | OUTPATIENT
Start: 2020-03-11 | End: 2020-03-19 | Stop reason: ALTCHOICE

## 2020-03-12 ENCOUNTER — TELEPHONE (OUTPATIENT)
Dept: ENDOCRINOLOGY | Facility: CLINIC | Age: 60
End: 2020-03-12

## 2020-03-12 ENCOUNTER — PATIENT OUTREACH (OUTPATIENT)
Dept: OTHER | Facility: OTHER | Age: 60
End: 2020-03-12

## 2020-03-12 NOTE — TELEPHONE ENCOUNTER
Tried contacting pt to inform her that her refill has been sent to the pharmacy for Humalog. Also she is due for a follow-up appt with labs the week before the appt per Daxa. The pt did not answer LVM to return call to set up those appts.

## 2020-03-16 ENCOUNTER — TELEPHONE (OUTPATIENT)
Dept: ENDOCRINOLOGY | Facility: CLINIC | Age: 60
End: 2020-03-16

## 2020-03-16 ENCOUNTER — PATIENT MESSAGE (OUTPATIENT)
Dept: ENDOCRINOLOGY | Facility: CLINIC | Age: 60
End: 2020-03-16

## 2020-03-16 ENCOUNTER — PATIENT OUTREACH (OUTPATIENT)
Dept: ADMINISTRATIVE | Facility: OTHER | Age: 60
End: 2020-03-16

## 2020-03-16 DIAGNOSIS — Z79.4 TYPE 2 DIABETES MELLITUS WITH DIABETIC POLYNEUROPATHY, WITH LONG-TERM CURRENT USE OF INSULIN: Primary | ICD-10-CM

## 2020-03-16 DIAGNOSIS — E11.42 TYPE 2 DIABETES MELLITUS WITH DIABETIC POLYNEUROPATHY, WITH LONG-TERM CURRENT USE OF INSULIN: Primary | ICD-10-CM

## 2020-03-16 NOTE — TELEPHONE ENCOUNTER
Spoke to patient today re: cancelling today's appt d/t COVID19 exposure. She states she is taking Trulicity, metformin 500 mg bid, Tresiba 58 units every AM, and lispro 12 units ac. She has not been using Malka because she needs the adhesive for it and will purchase it. She is testing BG 2-3x/day. She needs refill on Trulicity and Tresiba, and a new rx for Fiasp.     Advised pt to message over a BG log for further advice on insulin doses. She voiced understanding.

## 2020-03-16 NOTE — PROGRESS NOTES
Chart reviewed.   Requested updates from Care Everywhere.  Immunizations reconciled.   HM updated.    Placed eye cam orders.

## 2020-03-16 NOTE — PROGRESS NOTES
Pushing back outreach as health  is attempting contact, last attempt 3/12/20  BP has increased slightly, average 143/81 mmHg  Recommend repeat BMP due to prior BEAR -- will defer until COVIID-19 risk decreases  Confirm if she is taking olmesartan or valsartan (suspect olmesartan due to valsartan backorder)  Have room to increase hydralazine    Hypertension Medications             chlorthalidone (HYGROTEN) 25 MG Tab Take 1 tablet (25 mg total) by mouth once daily. (replaces hydrochlorothiazide for BP)    hydrALAZINE (APRESOLINE) 25 MG tablet Take 1 tablet (25 mg total) by mouth 3 (three) times daily.    metoprolol succinate (TOPROL-XL) 25 MG 24 hr tablet Take 1 tablet (25 mg total) by mouth once daily.    nitroGLYCERIN (NITROSTAT) 0.4 MG SL tablet Place 1 tablet (0.4 mg total) under the tongue every 5 (five) minutes as needed for Chest pain.    olmesartan (BENICAR) 40 MG tablet Take 1 tablet (40 mg total) by mouth once daily.    valsartan (DIOVAN) 320 MG tablet TAKE 1 TABLET BY MOUTH ONCE DAILY

## 2020-03-17 ENCOUNTER — TELEPHONE (OUTPATIENT)
Dept: FAMILY MEDICINE | Facility: CLINIC | Age: 60
End: 2020-03-17

## 2020-03-17 DIAGNOSIS — I10 ESSENTIAL HYPERTENSION: ICD-10-CM

## 2020-03-18 RX ORDER — HYDRALAZINE HYDROCHLORIDE 25 MG/1
TABLET, FILM COATED ORAL
Qty: 90 TABLET | Refills: 2 | Status: SHIPPED | OUTPATIENT
Start: 2020-03-18 | End: 2020-06-08

## 2020-03-19 ENCOUNTER — TELEPHONE (OUTPATIENT)
Dept: ENDOCRINOLOGY | Facility: CLINIC | Age: 60
End: 2020-03-19

## 2020-03-19 RX ORDER — INSULIN DEGLUDEC 200 U/ML
INJECTION, SOLUTION SUBCUTANEOUS
Qty: 9 SYRINGE | Refills: 1 | Status: SHIPPED | OUTPATIENT
Start: 2020-03-19 | End: 2020-04-21

## 2020-03-19 NOTE — TELEPHONE ENCOUNTER
----- Message from Nya Henderson sent at 3/19/2020  8:35 AM CDT -----  Contact: Self   Type: Patient Call Back    Who called: Self     What is the request in detail:patient states she was told to call in her sugar reading and her insulin would be called in but it was not . Please call     Can the clinic reply by [facility]?  No     Would the patient rather a call back or a response via [facility]?  Call   Best call back number: [phone]      [id number] [place] [address] [address] [phone] (Phone)  [phone] (Fax)

## 2020-03-19 NOTE — TELEPHONE ENCOUNTER
Rt pt call and she states insurance plan will only cover Fiasp insulin . Previous ordered insulin will cost her $95 out of pocket .  She's out of of your insulin and would like Fiasp sent to [facility] on [address] .  Please advise

## 2020-03-19 NOTE — TELEPHONE ENCOUNTER
Advised patient that I was awaiting her response on [facility] after she sent in her glucose log, in order to better determine insulin doses and send refills accordingly.   She states her BGs in the mornings are higher depending on what she eats at night.   Reviewed importance of good DM control to avoid severe COVID19 illness and this includes eating healthy.   Continue Novolog 12 units ac with ss and take 16 units before large meals with ss. She voiced understanding.

## 2020-03-20 ENCOUNTER — TELEPHONE (OUTPATIENT)
Dept: FAMILY MEDICINE | Facility: CLINIC | Age: 60
End: 2020-03-20

## 2020-03-20 DIAGNOSIS — F51.02 INSOMNIA DUE TO STRESS: ICD-10-CM

## 2020-03-23 RX ORDER — ZOLPIDEM TARTRATE 5 MG/1
5 TABLET ORAL NIGHTLY
Qty: 30 TABLET | Refills: 5 | Status: SHIPPED | OUTPATIENT
Start: 2020-03-23 | End: 2020-05-18 | Stop reason: SDUPTHER

## 2020-03-24 NOTE — TELEPHONE ENCOUNTER
Rx went to [facility], patient states that she no longer use [facility]. Can you resend to [facility]?

## 2020-03-24 NOTE — TELEPHONE ENCOUNTER
----- Message from Deysi Borjas sent at 3/24/2020 11:31 AM CDT -----  Contact: pt  Type: RX Refill Request    Who Called: pt    Have you contacted your pharmacy:    Refill or New Rx:zolpidem (AMBIEN) 5 MG Tab     RX Name and Strength:    How is the patient currently taking it? (ex. 1XDay):    Is this a 30 day or 90 day RX:    Preferred Pharmacy with phone number:[facility]    Local or Mail Order:    Ordering Provider:    Would the patient rather a call back or a response via [facility]?   Best Call Back Number:[phone]    Additional Information:

## 2020-03-25 ENCOUNTER — TELEPHONE (OUTPATIENT)
Dept: FAMILY MEDICINE | Facility: CLINIC | Age: 60
End: 2020-03-25

## 2020-03-25 NOTE — TELEPHONE ENCOUNTER
Called and informed pt that Nurse Carlos is actively working on sending Ambien RX to [facility] (RX was sent to [facility]; pt has changed pharmacy). Pt to call [facility] before going to make sure RX is ready for pickup. Pt verbalized understanding.     ----- Message from Destiny Garcia sent at 3/25/2020  2:49 PM CDT -----  Type: Patient Call Back    Who called: pt     What is the request in detail: pt states she spoke to staff yesterday who assured her that her med Ambien would be sent to [facility]. She states she is at the pharmacy now and they do not have it.      Can the clinic reply by [facility]? No     Would the patient rather a call back or a response via [facility]? Call back     Best call back number: [phone]    Additional Information:

## 2020-03-25 NOTE — TELEPHONE ENCOUNTER
[place] [facility] 3/[facility]/20; called rx into [facility] for 30 tabs with 5 refills

## 2020-04-07 NOTE — PROGRESS NOTES
"Digital Medicine: Health  Follow-Up    Reports she has been working her same job but different department, which has been stressful.  States she does not have 1 hour to wait to take BP readings.  Encouraged patient to try to take readings when she gets home.    The history is provided by the patient.     Follow Up  Follow-up reason(s): reading review      Readings are trending down due to lifestyle change.  Patient reports she is still not comfortable with BP readings in 140s.      INTERVENTION(S)  recommend physical activity, reviewed monitoring technique and denied questions    PLAN  patient verbalizes understanding and continue monitoring          Topic    Eye Exam     Hemoglobin A1C        Last 5 Patient Entered Readings                                      Current 30 Day Average: 142/79     Recent Readings 4/7/2020 4/4/2020 4/2/2020 4/2/2020 4/1/2020    SBP (mmHg) 149 134 140 142 134    DBP (mmHg) 77 82 75 75 81    Pulse 90 93 85 86 88                      Diet Screening   No change to diet.      Physical Activity Screening   No change to exercise routine.    When asked if exercising, patient responded: yes    Patient participates in the following activities: biking    States she uses her exercise bike "when I can."      SDOH  "

## 2020-04-20 ENCOUNTER — TELEPHONE (OUTPATIENT)
Dept: FAMILY MEDICINE | Facility: CLINIC | Age: 60
End: 2020-04-20

## 2020-04-20 ENCOUNTER — OFFICE VISIT (OUTPATIENT)
Dept: FAMILY MEDICINE | Facility: CLINIC | Age: 60
End: 2020-04-20
Payer: COMMERCIAL

## 2020-04-20 DIAGNOSIS — E78.2 MIXED HYPERLIPIDEMIA: ICD-10-CM

## 2020-04-20 DIAGNOSIS — I10 ESSENTIAL HYPERTENSION: Primary | ICD-10-CM

## 2020-04-20 DIAGNOSIS — E11.42 TYPE 2 DIABETES MELLITUS WITH DIABETIC POLYNEUROPATHY, WITH LONG-TERM CURRENT USE OF INSULIN: ICD-10-CM

## 2020-04-20 DIAGNOSIS — Z79.4 TYPE 2 DIABETES MELLITUS WITH DIABETIC POLYNEUROPATHY, WITH LONG-TERM CURRENT USE OF INSULIN: ICD-10-CM

## 2020-04-20 PROCEDURE — 99214 PR OFFICE/OUTPT VISIT, EST, LEVL IV, 30-39 MIN: ICD-10-PCS | Mod: 95,,, | Performed by: FAMILY MEDICINE

## 2020-04-20 PROCEDURE — 99214 OFFICE O/P EST MOD 30 MIN: CPT | Mod: 95,,, | Performed by: FAMILY MEDICINE

## 2020-04-20 NOTE — TELEPHONE ENCOUNTER
----- Message from Mirza Parnell sent at 4/20/2020  8:33 AM CDT -----  Contact: Cindy [phone]  Type: Patient Call Back    Who called:Cindy     What is the request in detail: The patient is requesting a call back from the staff. She stated that she had a virtual appt with Dr. Lombard this morning at 8, but never received a call. She fears she has missed the appt.    Can the clinic reply by [facility]?no    Would the patient rather a call back or a response via [facility]? Call back     Best call back number: [phone]

## 2020-04-20 NOTE — PROGRESS NOTES
Cindy Billingsley is a 59 y.o. female.      Visit type: Virtual visit with synchronous audio only  The patient is located outside of this physical clinic but within the State of [place], and a thorough physical exam was not performed.  The chief complaint was presented by patient and discussed during visit and is documented below.    100% of visit was audio counseling, and total visit lasted 20 minutes.     Each patient provided medical services by telemedicine is:  (1) informed of the relationship between the physician and patient and the respective role of any other health care provider with respect to management of the patient; and (2) notified that he or she may decline to receive medical services by telemedicine and may withdraw from such care at any time.  Patient consented to audio only visit.    Notes:    Hypertension   This is a chronic problem. The current episode started more than 1 year ago. The problem has been waxing and waning since onset. The problem is uncontrolled. Associated symptoms include anxiety, blurred vision and headaches. Pertinent negatives include no chest pain, neck pain, palpitations, peripheral edema, shortness of breath or sweats. There are no associated agents to hypertension. Risk factors for coronary artery disease include diabetes mellitus, dyslipidemia and post-menopausal state. Past treatments include angiotensin blockers and diuretics. The current treatment provides moderate improvement. Compliance problems include psychosocial issues.  Hypertensive end-organ damage includes retinopathy. There is no history of angina, kidney disease, CAD/MI, CVA, heart failure, left ventricular hypertrophy or PVD. There is no history of chronic renal disease, coarctation of the aorta, hyperaldosteronism, hypercortisolism, hyperparathyroidism, a hypertension causing med, pheochromocytoma, renovascular disease, sleep apnea or a thyroid problem.   Diabetes   She presents for her follow-up  diabetic visit. She has type 2 diabetes mellitus. Her disease course has been worsening. Hypoglycemia symptoms include headaches and nervousness/anxiousness. Pertinent negatives for hypoglycemia include no confusion, dizziness, mood changes, seizures, sleepiness, speech difficulty, sweats or tremors. Associated symptoms include blurred vision, foot paresthesias and visual change. Pertinent negatives for diabetes include no chest pain, no fatigue, no foot ulcerations, no polydipsia, no polyphagia, no polyuria, no weakness and no weight loss. There are no hypoglycemic complications. Symptoms are worsening. Diabetic complications include peripheral neuropathy and retinopathy. Pertinent negatives for diabetic complications include no autonomic neuropathy, CVA, heart disease, impotence, nephropathy or PVD. Risk factors for coronary artery disease include diabetes mellitus, hypertension and stress. Current diabetic treatment includes insulin injections and oral agent (monotherapy). She is compliant with treatment most of the time. Her weight is stable. She is following a generally healthy diet. She has not had a previous visit with a dietitian. She participates in exercise intermittently. An ACE inhibitor/angiotensin II receptor blocker is being taken. She does not see a podiatrist.Eye exam is current.     ROS  Review of Systems   Constitutional: Negative.  Negative for activity change, appetite change, chills, diaphoresis, fatigue, fever, unexpected weight change and weight loss.   HENT: Negative.  Negative for congestion, ear pain, hearing loss, nosebleeds, postnasal drip, rhinorrhea, sinus pressure, sneezing, sore throat and trouble swallowing.    Eyes: Positive for blurred vision. Negative for pain and visual disturbance.   Respiratory: Negative for cough, choking and shortness of breath.    Cardiovascular: Negative for chest pain, palpitations and leg swelling.   Gastrointestinal: Negative for abdominal pain,  constipation, diarrhea, nausea and vomiting.   Endocrine: Negative for polydipsia, polyphagia and polyuria.   Genitourinary: Negative for difficulty urinating, dysuria, frequency, impotence and urgency.   Musculoskeletal: Positive for arthralgias (both knees; right worse). Negative for back pain, gait problem, joint swelling, myalgias and neck pain.   Skin: Negative.    Allergic/Immunologic: Negative for environmental allergies and food allergies.   Neurological: Positive for headaches. Negative for dizziness, tingling, tremors, seizures, syncope, speech difficulty, weakness, light-headedness and numbness.   Psychiatric/Behavioral: Positive for sleep disturbance. Negative for behavioral problems, confusion, decreased concentration, dysphoric mood, hallucinations, self-injury and suicidal ideas. The patient is nervous/anxious. The patient is not hyperactive.        Assessment & Plan    Discussion of plan of care including treatment options regarding health and wellness were reviewed and discussed with patient.  Any changes to medication or treatment plan, as well as any screening blood test, imaging, or referrals to specialist, are documented.  Follow up as indicated.     1. Essential hypertension  Patient was counseled and encouraged to maintain a low sodium diet, as well as increasing physical activity.  Recommend random BP checks at home on a regular basis.  Will continue medication at this time, and follow up in 4-6 weeks, or sooner if blood pressure does not improve over time.  Patient advised to add additional 25 mg of hydralazine as needed at her second.     - Basic metabolic panel; Future    2. Type 2 diabetes mellitus with diabetic polyneuropathy, with long-term current use of insulin  Patient is encouraged to follow a diet low in carbohydrates and simple sugars.  Discussed simple vs. complex carbohydrates as well as eating times of certain meals. Advised to focus on good food choices and increased physical  activity and encouraged to adhere to medication regimen and/or lifestyle adjustments, and to check glucose level as recommended.  Contact office if glucose levels are not improving over time.  Will monitor HbA1c appropriately.   - Hemoglobin A1c; Future  - Basic metabolic panel; Future    3. Mixed hyperlipidemia  We discussed ways to manage cholesterol levels, including increasing whole grain foods and decreasing fried and fatty foods.  I also recommended OTC Omega 3 and Omega 6 supplements to improve overall cholesterol levels.  Will continue current therapy at this visit and will monitor lipids appropriately.   - Lipid panel; Future       Follow up in about 2 months (around 6/20/2020), or if symptoms worsen or fail to improve.        ACTIVE MEDICAL ISSUES:  Documented in Problem List    PAST MEDICAL HISTORY  Documented    PAST SURGICAL HISTORY:  Documented    SOCIAL HISTORY:  Documented    FAMILY HISTORY:  Documented    ALLERGIES AND MEDICATIONS: updated and reviewed.  Documented    Health Maintenance       Date Due Completion Date    HIV Screening [birth year]/1974 ---    Shingles Vaccine (1 of 2) 12/15/2009 ---    Eye Exam 03/14/2020 3/14/2019    Override on 6/19/2017: Done (Dr Enrique)    Override on 12/16/2016: Done (Dr. Royal)    Override on 4/1/2016: Done (Dr Brown)    Override on 6/26/2015: Declined ( she will go until 08/2015)    Override on 6/24/2014: Declined (had one done)    Hemoglobin A1c 03/18/2020 12/18/2019    Override on 6/26/2015: Done (will do future ,  appoint 06/29/15)    TETANUS VACCINE 08/01/2020 8/1/2010    Foot Exam 09/14/2020 9/14/2019    Override on 6/26/2018: Done    Override on 6/12/2017: Done    Override on 6/26/2015: Done (today)    Lipid Panel 12/18/2020 12/18/2019    Override on 1/12/2015: Done (future)    Low Dose Statin 12/22/2020 12/22/2019    Mammogram 06/18/2021 6/18/2019    Pap Smear with HPV Cotest 01/29/2022 1/29/2019    Colonoscopy 11/16/2027 11/16/2017

## 2020-04-21 ENCOUNTER — LAB VISIT (OUTPATIENT)
Dept: LAB | Facility: HOSPITAL | Age: 60
End: 2020-04-21
Attending: FAMILY MEDICINE
Payer: COMMERCIAL

## 2020-04-21 ENCOUNTER — PATIENT MESSAGE (OUTPATIENT)
Dept: FAMILY MEDICINE | Facility: CLINIC | Age: 60
End: 2020-04-21

## 2020-04-21 DIAGNOSIS — I10 ESSENTIAL HYPERTENSION: ICD-10-CM

## 2020-04-21 DIAGNOSIS — Z79.4 TYPE 2 DIABETES MELLITUS WITH DIABETIC POLYNEUROPATHY, WITH LONG-TERM CURRENT USE OF INSULIN: ICD-10-CM

## 2020-04-21 DIAGNOSIS — E11.42 TYPE 2 DIABETES MELLITUS WITH DIABETIC POLYNEUROPATHY, WITH LONG-TERM CURRENT USE OF INSULIN: ICD-10-CM

## 2020-04-21 DIAGNOSIS — E78.2 MIXED HYPERLIPIDEMIA: ICD-10-CM

## 2020-04-21 LAB
ANION GAP SERPL CALC-SCNC: 9 MMOL/L (ref 8–16)
BUN SERPL-MCNC: 25 MG/DL (ref 6–20)
CALCIUM SERPL-MCNC: 9.3 MG/DL (ref 8.7–10.5)
CHLORIDE SERPL-SCNC: 105 MMOL/L (ref 95–110)
CHOLEST SERPL-MCNC: 110 MG/DL (ref 120–199)
CHOLEST/HDLC SERPL: 2.6 {RATIO} (ref 2–5)
CO2 SERPL-SCNC: 26 MMOL/L (ref 23–29)
CREAT SERPL-MCNC: 0.9 MG/DL (ref 0.5–1.4)
EST. GFR  (AFRICAN AMERICAN): >60 ML/MIN/1.73 M^2
EST. GFR  (NON AFRICAN AMERICAN): >60 ML/MIN/1.73 M^2
ESTIMATED AVG GLUCOSE: 134 MG/DL (ref 68–131)
GLUCOSE SERPL-MCNC: 127 MG/DL (ref 70–110)
HBA1C MFR BLD HPLC: 6.3 % (ref 4–5.6)
HDLC SERPL-MCNC: 43 MG/DL (ref 40–75)
HDLC SERPL: 39.1 % (ref 20–50)
LDLC SERPL CALC-MCNC: 49.4 MG/DL (ref 63–159)
NONHDLC SERPL-MCNC: 67 MG/DL
POTASSIUM SERPL-SCNC: 4.3 MMOL/L (ref 3.5–5.1)
SODIUM SERPL-SCNC: 140 MMOL/L (ref 136–145)
TRIGL SERPL-MCNC: 88 MG/DL (ref 30–150)

## 2020-04-21 PROCEDURE — 80061 LIPID PANEL: CPT

## 2020-04-21 PROCEDURE — 36415 COLL VENOUS BLD VENIPUNCTURE: CPT | Mod: PO

## 2020-04-21 PROCEDURE — 83036 HEMOGLOBIN GLYCOSYLATED A1C: CPT

## 2020-04-21 PROCEDURE — 80048 BASIC METABOLIC PNL TOTAL CA: CPT

## 2020-04-21 RX ORDER — INSULIN DEGLUDEC 200 U/ML
INJECTION, SOLUTION SUBCUTANEOUS
Qty: 9 ML | Refills: 3 | Status: SHIPPED | OUTPATIENT
Start: 2020-04-21 | End: 2020-09-22

## 2020-05-06 ENCOUNTER — PATIENT MESSAGE (OUTPATIENT)
Dept: FAMILY MEDICINE | Facility: CLINIC | Age: 60
End: 2020-05-06

## 2020-05-06 DIAGNOSIS — F41.8 DEPRESSION WITH ANXIETY: ICD-10-CM

## 2020-05-06 RX ORDER — METFORMIN HYDROCHLORIDE 500 MG/1
1000 TABLET, EXTENDED RELEASE ORAL DAILY
Qty: 180 TABLET | Refills: 0 | Status: SHIPPED | OUTPATIENT
Start: 2020-05-06 | End: 2020-06-24

## 2020-05-12 ENCOUNTER — TELEPHONE (OUTPATIENT)
Dept: FAMILY MEDICINE | Facility: CLINIC | Age: 60
End: 2020-05-12

## 2020-05-12 ENCOUNTER — PATIENT MESSAGE (OUTPATIENT)
Dept: FAMILY MEDICINE | Facility: CLINIC | Age: 60
End: 2020-05-12

## 2020-05-12 DIAGNOSIS — F41.8 DEPRESSION WITH ANXIETY: ICD-10-CM

## 2020-05-12 DIAGNOSIS — G47.33 OSA (OBSTRUCTIVE SLEEP APNEA): Primary | ICD-10-CM

## 2020-05-12 RX ORDER — CLONAZEPAM 0.5 MG/1
0.5 TABLET ORAL 2 TIMES DAILY PRN
Qty: 60 TABLET | Refills: 1 | Status: SHIPPED | OUTPATIENT
Start: 2020-05-12 | End: 2020-06-16 | Stop reason: SDUPTHER

## 2020-05-12 RX ORDER — CLONAZEPAM 0.5 MG/1
0.5 TABLET ORAL 2 TIMES DAILY PRN
Qty: 60 TABLET | Refills: 2 | OUTPATIENT
Start: 2020-05-12 | End: 2021-05-12

## 2020-05-12 NOTE — TELEPHONE ENCOUNTER
----- Message from Destiny Garcia sent at 5/12/2020  9:07 AM CDT -----  Type: Patient Call Back    Who called: pt    What is the request in detail: pt asking for a call back from nurse. Pt declined to leave details.     Can the clinic reply by [facility]? No     Would the patient rather a call back or a response via [facility]? Call back     Best call back number: [phone]    Additional Information:

## 2020-05-14 ENCOUNTER — TELEPHONE (OUTPATIENT)
Dept: FAMILY MEDICINE | Facility: CLINIC | Age: 60
End: 2020-05-14

## 2020-05-14 RX ORDER — HYDROCHLOROTHIAZIDE 25 MG/1
25 TABLET ORAL DAILY
COMMUNITY
Start: 2020-03-11 | End: 2020-08-08 | Stop reason: SDUPTHER

## 2020-05-14 NOTE — PROGRESS NOTES
"Digital Medicine: Clinician Follow-Up    Reports she recently had an appointment with Dr. Lombard. She has increased Hydralazine to 50/50/25 mg TID but states she is not consistent with it. She was previously taking 25 mg TID. States she has been experiencing "racing" heart sensation for months that has not changed with hydralazine dosing.  States she is also taking Hctz 25 mg daily (was previously changed to chlorthalidone 25 mg daily). She is unclear why she changed back to hctz but appears it was an automatic refill at the pharmacy.  Wakes up 4:30 am, takes BP reading 4:30-5am  Difficult to take reading after medications on work days  meds first thing in morning on work days 4:30-5, Time varies on off days (depends on time she goes to sleep and wakes)  She works 3 days per week but days vary      The history is provided by the patient.     Follow Up  Follow-up reason(s): reading review          INTERVENTION(S)  reviewed appropriate dose schedule, encouragement/support and denied questions    PLAN  patient verbalizes understanding, await MD intervention and continue monitoring    Patient expresses frustration/concern about taking multiple BP medications, however, at the same time she is concerned with elevated SBP  Discussed options of increasing hydralazine further or increasing metoprolol  She has been recently discussing medication changes with Dr. Lombard. To decrease risk of confusion and per her preference, will route note to Dr. Lombard today and ask him to advise her on whether to further increase hydralazine or increase metoprolol instead.  Requested she take BP readings at least 45 minutes after medications on days she is not working  Patient experienced MCKAYLA with amlodipine 10 mg in past. Nifedipine was changed to hydralazine 12/2019.        Last 5 Patient Entered Readings                                      Current 30 Day Average: 141/77     Recent Readings 5/13/2020 5/12/2020 5/11/2020 5/9/2020 " 5/8/2020    SBP (mmHg) 140 145 131 144 137    DBP (mmHg) 85 68 75 83 68    Pulse 80 95 91 73 99             Hypertension Medications             hydrALAZINE (APRESOLINE) 25 MG tablet TAKE 1 TABLET BY MOUTH THREE TIMES DAILY    metoprolol succinate (TOPROL-XL) 25 MG 24 hr tablet Take 1 tablet (25 mg total) by mouth once daily.    nitroGLYCERIN (NITROSTAT) 0.4 MG SL tablet Place 1 tablet (0.4 mg total) under the tongue every 5 (five) minutes as needed for Chest pain.    valsartan (DIOVAN) 320 MG tablet TAKE 1 TABLET BY MOUTH ONCE DAILY      hctz 25 mg daily                       Medication Adherence Screening       Adherence tools used: Pill box

## 2020-05-18 ENCOUNTER — PATIENT MESSAGE (OUTPATIENT)
Dept: FAMILY MEDICINE | Facility: CLINIC | Age: 60
End: 2020-05-18

## 2020-05-18 DIAGNOSIS — F51.02 INSOMNIA DUE TO STRESS: ICD-10-CM

## 2020-05-18 NOTE — TELEPHONE ENCOUNTER
RX originally sent to [facility] in march but we called and cancelled because pt requested be sent to [facility]; pt now stating [facility] is out of medication and she would like new rx sent to [facility]; orders pended

## 2020-05-20 ENCOUNTER — PATIENT MESSAGE (OUTPATIENT)
Dept: FAMILY MEDICINE | Facility: CLINIC | Age: 60
End: 2020-05-20

## 2020-05-25 RX ORDER — ZOLPIDEM TARTRATE 5 MG/1
5 TABLET ORAL NIGHTLY
Qty: 30 TABLET | Refills: 1 | Status: SHIPPED | OUTPATIENT
Start: 2020-05-25 | End: 2020-11-04 | Stop reason: SDUPTHER

## 2020-06-05 ENCOUNTER — PATIENT OUTREACH (OUTPATIENT)
Dept: OTHER | Facility: OTHER | Age: 60
End: 2020-06-05

## 2020-06-16 DIAGNOSIS — F41.8 DEPRESSION WITH ANXIETY: ICD-10-CM

## 2020-06-22 ENCOUNTER — TELEPHONE (OUTPATIENT)
Dept: ENDOCRINOLOGY | Facility: CLINIC | Age: 60
End: 2020-06-22

## 2020-06-22 DIAGNOSIS — F41.8 DEPRESSION WITH ANXIETY: ICD-10-CM

## 2020-06-22 RX ORDER — CLONAZEPAM 0.5 MG/1
TABLET ORAL
Qty: 60 TABLET | OUTPATIENT
Start: 2020-06-22

## 2020-06-22 RX ORDER — CLONAZEPAM 0.5 MG/1
0.5 TABLET ORAL 2 TIMES DAILY PRN
Qty: 60 TABLET | Refills: 1 | Status: SHIPPED | OUTPATIENT
Start: 2020-06-22 | End: 2020-09-21

## 2020-06-22 NOTE — TELEPHONE ENCOUNTER
Spoke with Candice at the pharmacy and she states the Fiasp is covered for the pt. Her copay is $22.

## 2020-06-23 DIAGNOSIS — Z12.31 ENCOUNTER FOR SCREENING MAMMOGRAM FOR MALIGNANT NEOPLASM OF BREAST: Primary | ICD-10-CM

## 2020-06-23 DIAGNOSIS — F41.8 DEPRESSION WITH ANXIETY: ICD-10-CM

## 2020-06-23 NOTE — TELEPHONE ENCOUNTER
----- Message from Regina Lowe sent at 6/23/2020  9:51 AM CDT -----  Regarding: Self/ [phone]  Type: RX Refill Request    Who Called      Refill or New Rx:  Refill    RX Name and Strength:  clonazePAM (KLONOPIN) 0.5 MG tablet    Preferred Pharmacy with phone number:  [id number] [address]    Local or Mail Order:  Local    Ordering Provider:  Lombard, A    Would the patient rather a call back or a response via [facility]?   Call back    Best Call Back Number:    [phone]

## 2020-06-24 ENCOUNTER — PATIENT MESSAGE (OUTPATIENT)
Dept: ENDOCRINOLOGY | Facility: CLINIC | Age: 60
End: 2020-06-24

## 2020-06-24 RX ORDER — CLONAZEPAM 0.5 MG/1
0.5 TABLET ORAL 2 TIMES DAILY PRN
Qty: 60 TABLET | Refills: 1 | Status: CANCELLED | OUTPATIENT
Start: 2020-06-24 | End: 2021-06-24

## 2020-06-24 RX ORDER — METFORMIN HYDROCHLORIDE 500 MG/1
1000 TABLET, EXTENDED RELEASE ORAL DAILY
Qty: 180 TABLET | Refills: 2 | Status: SHIPPED | OUTPATIENT
Start: 2020-06-24 | End: 2020-07-31

## 2020-06-25 ENCOUNTER — PATIENT OUTREACH (OUTPATIENT)
Dept: OTHER | Facility: OTHER | Age: 60
End: 2020-06-25

## 2020-06-25 NOTE — PROGRESS NOTES
Left voicemail requesting call back  On last outreach, patient wanted Dr. Lombard's advice on whether to adjust metoprolol or hydralazine. Follow up if she received response.    BP average 142/79 mmHg

## 2020-07-06 ENCOUNTER — TELEPHONE (OUTPATIENT)
Dept: ENDOCRINOLOGY | Facility: CLINIC | Age: 60
End: 2020-07-06

## 2020-07-06 NOTE — TELEPHONE ENCOUNTER
Spoke with [facility] about the status of the pt's Metformin. The pharmacy stated they tried contacting the pt when the Rx was sent but the pt did not return their call. They can not send the Rx without talking to the pt.   I called the pt and informed her that she has to call to finish the process for her Metformin to be mailed to her. The pt was given the pharmacy's number.

## 2020-07-13 ENCOUNTER — TELEPHONE (OUTPATIENT)
Dept: FAMILY MEDICINE | Facility: CLINIC | Age: 60
End: 2020-07-13

## 2020-07-13 NOTE — TELEPHONE ENCOUNTER
----- Message from Erendira Sorto PharmD sent at 7/13/2020  1:34 PM CDT -----  Patient is requesting a virtual appointment to discuss her BP. She continues to have elevated readings and is resistant to further medication adjustments. She endorses inconsistent adherence with evening hydralazine dose and we discussed that today. She rarely uses her CPAP machine due to feeling claustrophobic and wonders if there are other options. Does not appear she has had a renal artery ultrasound either.

## 2020-07-13 NOTE — PROGRESS NOTES
"Digital Medicine: Clinician Follow-Up    Patient reports her /70 mmHg when woke up this morning. She took her medication, waited an hour and BP was 154/80s mmhg. Waited another 2 hours and /72 mmHg, pulse 85. Drank water and laid down quietly, /76 mmHg, pulse 88. Reports she has been having headaches since yesterday and a little dizzy.  Reports being less active this weekend due to heat. States she ate some greens which could have been salty but she "didn't eat that much."  She is frustrated that her BP is elevated despite multiple medications. She feels claustrophobic when she uses CPAP.  Reports she typically takes hydralazine 50/50/25 mg TID but frequently misses the evening dose. Thinks she took the evening dose 3 or 4 days this past week; she forgets it on her days off work.     The history is provided by the patient.   Follow Up  Follow-up reason(s): reading review      Adherence Barriers: patient forgets        INTERVENTION(S)  reviewed appropriate dose schedule, encouragement/support and denied questions    PLAN  patient verbalizes understanding, await MD intervention and continue monitoring    BP elevated  Reminded patient to submit all BP readings  Patient's primary concern continues to be that BP is elevated despite multiple medications. She reports poor adherence with CPAP. She is amenable to follow up appointment with Dr. Lombard to discuss possible secondary cause of htn and possible alternatives for CPAP. Sent a message to Dr. Lombard's staff requesting virtual appointment for patient.  Continue current regimen. Stressed adherence with hydralazine TID. Discussed maximum dose of 100 mg TID and that she could take an extra 25 mg today if SBP remains > 150 mmHg.  Have room to increase maintenance hydralazine or metoprolol if needed          Last 5 Patient Entered Readings                                      Current 30 Day Average: 144/83     Recent Readings 7/12/2020 7/5/2020 6/30/2020 " 6/25/2020 6/24/2020    SBP (mmHg) 148 132 145 144 143    DBP (mmHg) 92 70 85 79 88    Pulse 90 99 85 83 80             Hypertension Medications             hydrALAZINE (APRESOLINE) 25 MG tablet TAKE 1 TABLET BY MOUTH THREE TIMES DAILY    hydroCHLOROthiazide (HYDRODIURIL) 25 MG tablet Take 25 mg by mouth once daily.    metoprolol succinate (TOPROL-XL) 25 MG 24 hr tablet Take 1 tablet (25 mg total) by mouth once daily.    nitroGLYCERIN (NITROSTAT) 0.4 MG SL tablet Place 1 tablet (0.4 mg total) under the tongue every 5 (five) minutes as needed for Chest pain.    valsartan (DIOVAN) 320 MG tablet TAKE 1 TABLET BY MOUTH ONCE DAILY                             Medication Adherence Screening   She missed a dose more than once this week.  Patient knows purpose of medications.      Patient identified the following reasons for non-compliance: Patient forgets

## 2020-07-14 NOTE — TELEPHONE ENCOUNTER
Call made to pt in attempt to schedule pt a virtual visit per request. But unable to hear pt, called back 3 times, pt still having telephone problems. Appointment set and mailed to pt.

## 2020-07-14 NOTE — TELEPHONE ENCOUNTER
----- Message from Miguelina Arevalo sent at 7/13/2020  5:20 PM CDT -----  Patient called and said she missed your call to please call back at [phone]

## 2020-07-14 NOTE — PROGRESS NOTES
"Received voicemail from patient that she went to ED overnight due to elevated BP  She states no heart attack or stroke and that they "didn't do anything"  She noted she was going to bed due to being up several hours. Will attempt to contact her tomorrow.  She is scheduled for follow up with Dr. Lombard 8/8/20  Suspect anxiety regarding BP is contributing to elevations  "

## 2020-07-15 ENCOUNTER — PATIENT OUTREACH (OUTPATIENT)
Dept: OTHER | Facility: OTHER | Age: 60
End: 2020-07-15

## 2020-07-30 NOTE — PROGRESS NOTES
Digital Medicine: Clinician Follow-Up    Patient reports she is taking Hydralazine 50/50/25 mg daily. She admits she frequently misses the evening dose on days she is off work. She works 3 days a week.    She continues to express concern that her BP is elevated while being on multiple medications. She has PCP appointment 8/8/20.    She relates that she is stressed during COVID pandemic. She continues to work and wears a mask. She sees her family less often. She reports prayer and talking with friends as coping tools.        The history is provided by the patient.   Follow-up reason(s): routine follow up.         Last 5 Patient Entered Readings                                      Current 30 Day Average: 148/79     Recent Readings 7/30/2020 7/29/2020 7/28/2020 7/27/2020 7/26/2020    SBP (mmHg) 142 149 148 153 150    DBP (mmHg) 78 84 78 88 74    Pulse 83 81 83 75 83               Depression Screening  Did not address depression screening.    Sleep Apnea Screening    Did not address sleep apnea screening.     Medication Affordability Screening  Did not address medication affordability screening.     Medication Adherence-Medication adherence was asssessed.    Patient reported missing medication: more than once a week and third hydralazine dose.    Patient identified the following reasons for non-adherence:  Patient forgets:            ASSESSMENT(S)  Patients BP average is 148/79 mmHg, which is above goal. Patient's BP goal is less than or equal to 130/80 per 2017 ACC/AHA Hypertension Guidelines.   Suspect stress and medication adherence affecting BP control    PLAN  Continue current therapy: Reinforced medication adherence. Encouraged patient to discuss BP concerns with Dr. Lombard at upcoming appointment and discuss possibility of secondary causes.  Await MD intervention:  Recommended adherence tool: Discussed placing post-it note on mirror and/or setting alarm/reminder in cell phone for third dose  Provided patient  education: Encouraged patient to consider if she would like to speak with a therapist/counselor regarding stress. Encouraged her to discuss concerns further with Dr. Lombard.    Patient verbalizes understanding. Patient did not express questions or concerns and patient has contact information if needed.              Hypertension Medications             hydrALAZINE (APRESOLINE) 25 MG tablet TAKE 1 TABLET BY MOUTH THREE TIMES DAILY    hydroCHLOROthiazide (HYDRODIURIL) 25 MG tablet Take 25 mg by mouth once daily.    metoprolol succinate (TOPROL-XL) 25 MG 24 hr tablet Take 1 tablet (25 mg total) by mouth once daily.    nitroGLYCERIN (NITROSTAT) 0.4 MG SL tablet Place 1 tablet (0.4 mg total) under the tongue every 5 (five) minutes as needed for Chest pain.    valsartan (DIOVAN) 320 MG tablet TAKE 1 TABLET BY MOUTH ONCE DAILY

## 2020-07-31 ENCOUNTER — HOSPITAL ENCOUNTER (OUTPATIENT)
Dept: RADIOLOGY | Facility: HOSPITAL | Age: 60
Discharge: HOME | End: 2020-07-31
Attending: FAMILY MEDICINE
Payer: COMMERCIAL

## 2020-07-31 DIAGNOSIS — Z79.4 TYPE 2 DIABETES MELLITUS WITH DIABETIC POLYNEUROPATHY, WITH LONG-TERM CURRENT USE OF INSULIN: Primary | ICD-10-CM

## 2020-07-31 DIAGNOSIS — E11.42 TYPE 2 DIABETES MELLITUS WITH DIABETIC POLYNEUROPATHY, WITH LONG-TERM CURRENT USE OF INSULIN: Primary | ICD-10-CM

## 2020-07-31 DIAGNOSIS — Z12.31 ENCOUNTER FOR SCREENING MAMMOGRAM FOR MALIGNANT NEOPLASM OF BREAST: ICD-10-CM

## 2020-07-31 PROCEDURE — 77067 SCR MAMMO BI INCL CAD: CPT | Mod: TC,PO

## 2020-07-31 PROCEDURE — 77063 BREAST TOMOSYNTHESIS BI: CPT | Mod: 26,,, | Performed by: RADIOLOGY

## 2020-07-31 PROCEDURE — 77067 MAMMO DIGITAL SCREENING BILAT WITH TOMOSYNTHESIS_CAD: ICD-10-PCS | Mod: 26,,, | Performed by: RADIOLOGY

## 2020-07-31 PROCEDURE — 77063 MAMMO DIGITAL SCREENING BILAT WITH TOMOSYNTHESIS_CAD: ICD-10-PCS | Mod: 26,,, | Performed by: RADIOLOGY

## 2020-07-31 PROCEDURE — 77067 SCR MAMMO BI INCL CAD: CPT | Mod: 26,,, | Performed by: RADIOLOGY

## 2020-07-31 RX ORDER — METFORMIN HYDROCHLORIDE 500 MG/1
TABLET, EXTENDED RELEASE ORAL
Qty: 180 TABLET | Refills: 0 | Status: SHIPPED | OUTPATIENT
Start: 2020-07-31 | End: 2020-09-10

## 2020-08-08 ENCOUNTER — OFFICE VISIT (OUTPATIENT)
Dept: FAMILY MEDICINE | Facility: CLINIC | Age: 60
End: 2020-08-08
Payer: COMMERCIAL

## 2020-08-08 DIAGNOSIS — L30.9 DERMATITIS: ICD-10-CM

## 2020-08-08 DIAGNOSIS — E11.42 TYPE 2 DIABETES MELLITUS WITH DIABETIC POLYNEUROPATHY, WITH LONG-TERM CURRENT USE OF INSULIN: ICD-10-CM

## 2020-08-08 DIAGNOSIS — E78.2 MIXED HYPERLIPIDEMIA: ICD-10-CM

## 2020-08-08 DIAGNOSIS — I10 ESSENTIAL HYPERTENSION: Primary | ICD-10-CM

## 2020-08-08 DIAGNOSIS — Z79.4 TYPE 2 DIABETES MELLITUS WITH DIABETIC POLYNEUROPATHY, WITH LONG-TERM CURRENT USE OF INSULIN: ICD-10-CM

## 2020-08-08 PROCEDURE — 99214 OFFICE O/P EST MOD 30 MIN: CPT | Mod: 95,,, | Performed by: FAMILY MEDICINE

## 2020-08-08 PROCEDURE — 99214 PR OFFICE/OUTPT VISIT, EST, LEVL IV, 30-39 MIN: ICD-10-PCS | Mod: 95,,, | Performed by: FAMILY MEDICINE

## 2020-08-08 RX ORDER — VALSARTAN 320 MG/1
320 TABLET ORAL DAILY
Qty: 90 TABLET | Refills: 1 | Status: SHIPPED | OUTPATIENT
Start: 2020-08-08 | End: 2022-11-08

## 2020-08-08 RX ORDER — HYDRALAZINE HYDROCHLORIDE 50 MG/1
50 TABLET, FILM COATED ORAL EVERY 8 HOURS
Qty: 270 TABLET | Refills: 1 | Status: SHIPPED | OUTPATIENT
Start: 2020-08-08 | End: 2021-02-23 | Stop reason: SDUPTHER

## 2020-08-08 RX ORDER — HYDROCHLOROTHIAZIDE 25 MG/1
25 TABLET ORAL DAILY
Qty: 90 TABLET | Refills: 1 | Status: SHIPPED | OUTPATIENT
Start: 2020-08-08

## 2020-08-08 RX ORDER — ATORVASTATIN CALCIUM 40 MG/1
40 TABLET, FILM COATED ORAL NIGHTLY
Qty: 90 TABLET | Refills: 1 | Status: SHIPPED | OUTPATIENT
Start: 2020-08-08 | End: 2021-02-25

## 2020-08-08 RX ORDER — TRIAMCINOLONE ACETONIDE 5 MG/G
OINTMENT TOPICAL 2 TIMES DAILY
Qty: 30 G | Refills: 5 | Status: SHIPPED | OUTPATIENT
Start: 2020-08-08

## 2020-08-08 NOTE — PROGRESS NOTES
Cindy Billingsley is a 59 y.o. female.      Visit type: Virtual visit with synchronous audio and video  The patient is located outside of this physical clinic but within the State of [place], and a thorough physical exam was not performed.  The chief complaint was presented by patient and discussed during visit and is documented below.    100% of visit was face to face counseling, and total visit lasted 20 minutes.     Each patient provided medical services by telemedicine is:  (1) informed of the relationship between the physician and patient and the respective role of any other health care provider with respect to management of the patient; and (2) notified that he or she may decline to receive medical services by telemedicine and may withdraw from such care at any time.    Notes:    Hypertension  This is a chronic problem. The current episode started more than 1 year ago. The problem has been waxing and waning since onset. The problem is uncontrolled. Associated symptoms include anxiety, blurred vision and headaches. Pertinent negatives include no chest pain, neck pain, palpitations, peripheral edema, shortness of breath or sweats. There are no associated agents to hypertension. Risk factors for coronary artery disease include diabetes mellitus, dyslipidemia and post-menopausal state. Past treatments include angiotensin blockers and diuretics. The current treatment provides moderate improvement. Compliance problems include psychosocial issues.  Hypertensive end-organ damage includes retinopathy. There is no history of angina, kidney disease, CAD/MI, CVA, heart failure, left ventricular hypertrophy or PVD. There is no history of chronic renal disease, coarctation of the aorta, hyperaldosteronism, hypercortisolism, hyperparathyroidism, a hypertension causing med, pheochromocytoma, renovascular disease, sleep apnea or a thyroid problem.   Diabetes  She presents for her follow-up diabetic visit. She has type 2  diabetes mellitus. Her disease course has been worsening. Hypoglycemia symptoms include headaches and nervousness/anxiousness. Pertinent negatives for hypoglycemia include no confusion, dizziness, mood changes, seizures, sleepiness, speech difficulty, sweats or tremors. Associated symptoms include blurred vision, foot paresthesias and visual change. Pertinent negatives for diabetes include no chest pain, no fatigue, no foot ulcerations, no polydipsia, no polyphagia, no polyuria, no weakness and no weight loss. There are no hypoglycemic complications. Symptoms are worsening. Diabetic complications include peripheral neuropathy and retinopathy. Pertinent negatives for diabetic complications include no autonomic neuropathy, CVA, heart disease, impotence, nephropathy or PVD. Risk factors for coronary artery disease include diabetes mellitus, hypertension and stress. Current diabetic treatment includes insulin injections and oral agent (monotherapy). She is compliant with treatment most of the time. Her weight is stable. She is following a generally healthy diet. She has not had a previous visit with a dietitian. She participates in exercise intermittently. An ACE inhibitor/angiotensin II receptor blocker is being taken. She does not see a podiatrist.Eye exam is current.        ROS  Review of Systems   Constitutional: Negative.  Negative for activity change, appetite change, chills, diaphoresis, fatigue, fever, unexpected weight change and weight loss.   HENT: Negative.  Negative for congestion, ear pain, hearing loss, nosebleeds, postnasal drip, rhinorrhea, sinus pressure, sneezing, sore throat and trouble swallowing.    Eyes: Positive for blurred vision. Negative for pain, discharge and visual disturbance.   Respiratory: Negative for cough, choking, chest tightness, shortness of breath and wheezing.    Cardiovascular: Negative for chest pain, palpitations and leg swelling.   Gastrointestinal: Negative for abdominal  pain, blood in stool, constipation, diarrhea, nausea and vomiting.   Endocrine: Negative for polydipsia, polyphagia and polyuria.   Genitourinary: Negative for difficulty urinating, dysuria, frequency, hematuria, impotence, menstrual problem and urgency.   Musculoskeletal: Negative.  Negative for arthralgias, back pain, gait problem, joint swelling, myalgias and neck pain.   Skin: Negative.    Allergic/Immunologic: Negative for environmental allergies and food allergies.   Neurological: Positive for headaches. Negative for dizziness, tremors, seizures, syncope, speech difficulty, weakness and light-headedness.   Psychiatric/Behavioral: Negative for confusion, decreased concentration, dysphoric mood and sleep disturbance. The patient is nervous/anxious.        Assessment & Plan    Discussion of plan of care including treatment options regarding health and wellness were reviewed and discussed with patient.  Any changes to medication or treatment plan, as well as any screening blood test, imaging, or referrals to specialist, are documented.  Follow up as indicated.     1. Essential hypertension  Patient was counseled and encouraged to maintain a low sodium diet, as well as increasing physical activity.  Recommend random BP checks at home on a regular basis.  Repeat BP at end of visit was not necessary. Will continue medication at this time, and follow up in 3-6 months, or sooner if blood pressure begins to increase.  Patient is active with Digital HTN program.  - hydrALAZINE (APRESOLINE) 50 MG tablet; Take 1 tablet (50 mg total) by mouth every 8 (eight) hours.  Dispense: 270 tablet; Refill: 1  - hydroCHLOROthiazide (HYDRODIURIL) 25 MG tablet; Take 1 tablet (25 mg total) by mouth once daily.  Dispense: 90 tablet; Refill: 1  - valsartan (DIOVAN) 320 MG tablet; Take 1 tablet (320 mg total) by mouth once daily.  Dispense: 90 tablet; Refill: 1    2. Type 2 diabetes mellitus with diabetic polyneuropathy, with long-term  current use of insulin  Patient is encouraged to follow a diet low in carbohydrates and simple sugars.  Discussed simple vs. complex carbohydrates as well as eating times of certain meals. Advised to focus on good food choices and increased physical activity and encouraged to adhere to medication regimen and/or lifestyle adjustments, and to check glucose level as recommended.  Contact office if glucose levels are not improving over time.  Will monitor HbA1c appropriately.     3. Mixed hyperlipidemia  We discussed ways to manage cholesterol levels, including increasing whole grain foods and decreasing fried and fatty foods.  I also recommended OTC Omega 3 and Omega 6 supplements to improve overall cholesterol levels.  Will continue current therapy at this visit and will monitor lipids appropriately.   - atorvastatin (LIPITOR) 40 MG tablet; Take 1 tablet (40 mg total) by mouth every evening.  Dispense: 90 tablet; Refill: 1    4. Dermatitis  Topical steroid ordered to alleviate chronic rash; first mentioned in 12/2018. Referral to appropriate specialist for evaluation and management placed in Epic.   - triamcinolone (KENALOG) 0.5 % ointment; Apply topically 2 (two) times daily.  Dispense: 30 g; Refill: 5  - Ambulatory referral/consult to Dermatology; Future       Follow up in about 6 months (around 2/8/2021) for Chronic Disease Management.        ACTIVE MEDICAL ISSUES:  Documented in Problem List    PAST MEDICAL HISTORY  Documented    PAST SURGICAL HISTORY:  Documented    SOCIAL HISTORY:  Documented    FAMILY HISTORY:  Documented    ALLERGIES AND MEDICATIONS: updated and reviewed.  Documented    Health Maintenance       Date Due Completion Date    HIV Screening 12/15/1974 ---    Shingles Vaccine (1 of 2) 12/15/2009 ---    Eye Exam 03/14/2020 3/14/2019    Override on 6/19/2017: Done (Dr Enrique)    Override on 12/16/2016: Done (Dr. Royal)    Override on 4/1/2016: Done (Dr Brown)    Override on 6/26/2015:  Declined ( she will go until 08/2015)    Override on 6/24/2014: Declined (had one done)    Hemoglobin A1c 07/21/2020 4/21/2020    Override on 6/26/2015: Done (will do future ,  appoint 06/29/15)    TETANUS VACCINE 08/01/2020 8/1/2010    Foot Exam 09/14/2020 9/14/2019    Override on 6/26/2018: Done    Override on 6/12/2017: Done    Override on 6/26/2015: Done (today)    Influenza Vaccine (1) 09/01/2020 10/4/2019    Override on 10/30/2015: Done    Override on 6/26/2015: Declined (not at this present time)    Override on 10/16/2014: Done    Low Dose Statin 12/22/2020 12/22/2019    Lipid Panel 04/21/2021 4/21/2020    Override on 1/12/2015: Done (future)    Cervical Cancer Screening 01/29/2022 1/29/2019    Mammogram 07/31/2022 7/31/2020    Colorectal Cancer Screening 11/16/2027 11/16/2017

## 2020-08-25 ENCOUNTER — PATIENT OUTREACH (OUTPATIENT)
Dept: OTHER | Facility: OTHER | Age: 60
End: 2020-08-25
Payer: COMMERCIAL

## 2020-08-25 DIAGNOSIS — I10 ESSENTIAL HYPERTENSION: Primary | ICD-10-CM

## 2020-09-09 DIAGNOSIS — E11.42 TYPE 2 DIABETES MELLITUS WITH DIABETIC POLYNEUROPATHY, WITH LONG-TERM CURRENT USE OF INSULIN: Primary | ICD-10-CM

## 2020-09-09 DIAGNOSIS — Z79.4 TYPE 2 DIABETES MELLITUS WITH DIABETIC POLYNEUROPATHY, WITH LONG-TERM CURRENT USE OF INSULIN: Primary | ICD-10-CM

## 2020-09-10 RX ORDER — METFORMIN HYDROCHLORIDE 500 MG/1
TABLET, EXTENDED RELEASE ORAL
Qty: 180 TABLET | Refills: 0 | Status: SHIPPED | OUTPATIENT
Start: 2020-09-10 | End: 2020-10-23 | Stop reason: SDUPTHER

## 2020-09-10 NOTE — TELEPHONE ENCOUNTER
Spoke with the pt and informed her that her refill on Metformin has been sent. Also she scheduled her follow up appointment and will call back once she finds out where she can get her labs done.

## 2020-09-22 RX ORDER — INSULIN DEGLUDEC 200 U/ML
INJECTION, SOLUTION SUBCUTANEOUS
Qty: 3 SYRINGE | Refills: 0 | Status: SHIPPED | OUTPATIENT
Start: 2020-09-22 | End: 2020-10-23 | Stop reason: SDUPTHER

## 2020-09-22 NOTE — TELEPHONE ENCOUNTER
Tried contacting the pt to inform her that her refill for Tresiba has been sent. Also she is due for a follow up visit with labs the week prior. She did not answer LVM to return call or send a message through the portal with availability.

## 2020-10-01 ENCOUNTER — PATIENT OUTREACH (OUTPATIENT)
Dept: ADMINISTRATIVE | Facility: OTHER | Age: 60
End: 2020-10-01

## 2020-10-08 ENCOUNTER — PATIENT MESSAGE (OUTPATIENT)
Dept: FAMILY MEDICINE | Facility: CLINIC | Age: 60
End: 2020-10-08

## 2020-10-23 ENCOUNTER — PATIENT MESSAGE (OUTPATIENT)
Dept: FAMILY MEDICINE | Facility: CLINIC | Age: 60
End: 2020-10-23

## 2020-10-23 DIAGNOSIS — E11.42 TYPE 2 DIABETES MELLITUS WITH DIABETIC POLYNEUROPATHY, WITH LONG-TERM CURRENT USE OF INSULIN: Primary | ICD-10-CM

## 2020-10-23 DIAGNOSIS — Z79.4 TYPE 2 DIABETES MELLITUS WITH DIABETIC POLYNEUROPATHY, WITH LONG-TERM CURRENT USE OF INSULIN: Primary | ICD-10-CM

## 2020-10-23 RX ORDER — INSULIN ASPART INJECTION 100 [IU]/ML
INJECTION, SOLUTION SUBCUTANEOUS
Qty: 20 SYRINGE | Refills: 1 | Status: SHIPPED | OUTPATIENT
Start: 2020-10-23

## 2020-10-23 RX ORDER — DULAGLUTIDE 1.5 MG/.5ML
INJECTION, SOLUTION SUBCUTANEOUS
Qty: 10 ML | Refills: 0 | Status: SHIPPED | OUTPATIENT
Start: 2020-10-23 | End: 2021-02-04 | Stop reason: SDUPTHER

## 2020-10-23 RX ORDER — METFORMIN HYDROCHLORIDE 500 MG/1
1000 TABLET, EXTENDED RELEASE ORAL DAILY
Qty: 180 TABLET | Refills: 0 | Status: SHIPPED | OUTPATIENT
Start: 2020-10-23 | End: 2021-02-08 | Stop reason: SDUPTHER

## 2020-10-23 RX ORDER — INSULIN DEGLUDEC 200 U/ML
INJECTION, SOLUTION SUBCUTANEOUS
Qty: 3 SYRINGE | Refills: 0 | Status: SHIPPED | OUTPATIENT
Start: 2020-10-23 | End: 2020-12-03 | Stop reason: SDUPTHER

## 2020-11-04 ENCOUNTER — PATIENT MESSAGE (OUTPATIENT)
Dept: FAMILY MEDICINE | Facility: CLINIC | Age: 60
End: 2020-11-04

## 2020-11-04 DIAGNOSIS — F51.02 INSOMNIA DUE TO STRESS: ICD-10-CM

## 2020-11-06 RX ORDER — ZOLPIDEM TARTRATE 5 MG/1
5 TABLET ORAL NIGHTLY
Qty: 30 TABLET | Refills: 1 | Status: SHIPPED | OUTPATIENT
Start: 2020-11-06 | End: 2021-01-19 | Stop reason: SDUPTHER

## 2020-11-11 ENCOUNTER — HOSPITAL ENCOUNTER (EMERGENCY)
Facility: HOSPITAL | Age: 60
Discharge: HOME | End: 2020-11-11
Attending: EMERGENCY MEDICINE
Payer: COMMERCIAL

## 2020-11-11 ENCOUNTER — NURSE TRIAGE (OUTPATIENT)
Dept: ADMINISTRATIVE | Facility: CLINIC | Age: 60
End: 2020-11-11

## 2020-11-11 VITALS
BODY MASS INDEX: 28.93 KG/M2 | OXYGEN SATURATION: 95 % | HEART RATE: 81 BPM | RESPIRATION RATE: 18 BRPM | HEIGHT: 66 IN | DIASTOLIC BLOOD PRESSURE: 93 MMHG | SYSTOLIC BLOOD PRESSURE: 199 MMHG | TEMPERATURE: 98 F | WEIGHT: 180 LBS

## 2020-11-11 DIAGNOSIS — M43.02 SPONDYLOLYSIS, CERVICAL REGION: ICD-10-CM

## 2020-11-11 DIAGNOSIS — G44.319 ACUTE POST-TRAUMATIC HEADACHE, NOT INTRACTABLE: ICD-10-CM
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
## 2020-11-11 DIAGNOSIS — M54.2 NECK PAIN: ICD-10-CM

## 2020-11-11 PROCEDURE — 99284 EMERGENCY DEPT VISIT MOD MDM: CPT | Mod: 25

## 2020-11-11 PROCEDURE — 25000003 PHARM REV CODE 250: Performed by: NURSE PRACTITIONER

## 2020-11-11 RX ORDER — CYCLOBENZAPRINE HCL 10 MG
10 TABLET ORAL 3 TIMES DAILY PRN
Qty: 15 TABLET | Refills: 0 | Status: SHIPPED | OUTPATIENT
Start: 2020-11-11 | End: 2020-11-16

## 2020-11-11 RX ORDER — ACETAMINOPHEN 500 MG
1000 TABLET ORAL
Status: COMPLETED | OUTPATIENT
Start: 2020-11-11 | End: 2020-11-11

## 2020-11-11 RX ORDER — LIDOCAINE 50 MG/G
1 PATCH TOPICAL DAILY
Qty: 15 PATCH | Refills: 0 | Status: SHIPPED | OUTPATIENT
Start: 2020-11-11

## 2020-11-11 RX ORDER — NAPROXEN 500 MG/1
500 TABLET ORAL 2 TIMES DAILY PRN
Qty: 20 TABLET | Refills: 0 | Status: SHIPPED | OUTPATIENT
Start: 2020-11-11 | End: 2020-11-16

## 2020-11-11 RX ADMIN — ACETAMINOPHEN 1000 MG: 500 TABLET ORAL at 01:11
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
## 2020-11-11 NOTE — DISCHARGE INSTRUCTIONS
You have been prescribed FLEXERIL for pain. Please do not take this medication while working, drinking alcohol, swimming, or while driving/operating heavy machinery. This medication may cause drowsiness, impair judgment, and reduce physical capabilities.    You have been prescribed Naproxen for pain. This is an Non-Steroidal Anti-Inflammatory (NSAID) Medication. Please do not take any additional NSAIDs while you are taking this medication including (Advil, Aleve, Motrin, Ibuprofen, Mobic\meloxicam, Naprosyn, etc.). Please stop taking this medication if you experience: weakness, itching, yellow skin or eyes, joint pains, vomiting blood, blood or black stools, unusual weight gain, or swelling in your arms, legs, hands, or feet.     Please return to the Emergency Department for any new or worsening symptoms including: fever, chest pain, shortness of breath, loss of consciousness, dizziness, weakness, or any other concerns.     Please follow up with your Primary Care Provider within in the week. If you do not have one, you may contact the one listed on your discharge paperwork or you may also call the [facility] Appointment Desk at [phone] to schedule an appointment with one.     Please take all medication as prescribed.
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
## 2020-12-03 DIAGNOSIS — Z79.4 TYPE 2 DIABETES MELLITUS WITH DIABETIC POLYNEUROPATHY, WITH LONG-TERM CURRENT USE OF INSULIN: ICD-10-CM

## 2020-12-03 DIAGNOSIS — E11.42 TYPE 2 DIABETES MELLITUS WITH DIABETIC POLYNEUROPATHY, WITH LONG-TERM CURRENT USE OF INSULIN: ICD-10-CM

## 2020-12-03 NOTE — TELEPHONE ENCOUNTER
Last Office Visit Info:   The patient's last visit with Azikiwe K Lombard, MD was on 8/8/2020.    The patient's last visit in current department was on 8/8/2020.        Last CBC Results:   Lab Results   Component Value Date    WBC 6.00 12/24/2019    HGB 13.4 12/24/2019    HCT 45.0 12/24/2019     12/24/2019       Last CMP Results  Lab Results   Component Value Date     04/21/2020    K 4.3 04/21/2020     04/21/2020    CO2 26 04/21/2020    BUN 25 (H) 04/21/2020    CREATININE 0.9 04/21/2020    CALCIUM 9.3 04/21/2020    ALBUMIN 4.0 12/22/2019    AST 28 12/22/2019    ALT 27 12/22/2019       Last Lipids  Lab Results   Component Value Date    CHOL 110 (L) 04/21/2020    TRIG 88 04/21/2020    HDL 43 04/21/2020    LDLCALC 49.4 (L) 04/21/2020       Last A1C  Lab Results   Component Value Date    HGBA1C 6.0 (H) 09/11/2020       Last TSH  Lab Results   Component Value Date    TSH 2.209 12/22/2019         Current Med Refills  Medication List with Changes/Refills   Current Medications    ASPIRIN (ADULT ASPIRIN EC LOW STRENGTH) 81 MG EC TABLET    Take 81 mg by mouth. 1 Tablet, Delayed Release (E.C.) Oral Every day       Start Date: --        End Date: --    ATORVASTATIN (LIPITOR) 40 MG TABLET    Take 1 tablet (40 mg total) by mouth every evening.       Start Date: 8/8/2020  End Date: --    BLOOD SUGAR DIAGNOSTIC (BLOOD GLUCOSE TEST) STRP    One touch       Start Date: 1/30/2015 End Date: --    BLOOD-GLUCOSE METER KIT    Use as instructed       Start Date: 10/13/2014End Date: 12/18/2019    CLONAZEPAM (KLONOPIN) 0.5 MG TABLET    TAKE 1 TABLET BY MOUTH TWICE DAILY AS NEEDED FOR ANXIETY.       Start Date: 9/21/2020 End Date: --    DIABETIC SUPPLIES, MISCELLAN. KIT    Please change sensor every 14 days. FreeStyle Malka Sensor 28-day supply (2 sensors/month)       Start Date: 9/4/2019  End Date: --    DICLOFENAC SODIUM (VOLTAREN) 1 % GEL    Apply 2 g topically 2 (two) times daily.       Start Date: 5/21/2019 End Date:  "--    DULAGLUTIDE (TRULICITY) 1.5 MG/0.5 ML PEN INJECTOR    INJECT 1 SYRINGE AS DIRECTED EVERY 7 DAYS       Start Date: 10/23/2020End Date: --    FREESTYLE CHANDLER 14 DAY SENSOR KIT    PLEASE CHANGE SENSOR EVERY 14 DAYS.       Start Date: 10/5/2019 End Date: --    HYDRALAZINE (APRESOLINE) 50 MG TABLET    Take 1 tablet (50 mg total) by mouth every 8 (eight) hours.       Start Date: 8/8/2020  End Date: --    HYDROCHLOROTHIAZIDE (HYDRODIURIL) 25 MG TABLET    Take 1 tablet (25 mg total) by mouth once daily.       Start Date: 8/8/2020  End Date: --    INSULIN ASPART, NIACINAMIDE, (FIASP FLEXTOUCH U-100 INSULIN) 100 UNIT/ML (3 ML) INPN    Inject 12 units before regular meals and 16 units before large meals with scale: 150-200=+2, 201-250=+4; 251-300=+6; 301-350=+8, >350=+10 units       Start Date: 10/23/2020End Date: --    INSULIN DEGLUDEC (TRESIBA FLEXTOUCH U-200) 200 UNIT/ML (3 ML) INPN    ADMINISTER 58 UNITS UNDER THE SKIN EVERY DAY       Start Date: 10/23/2020End Date: --    INSULIN SYRINGE-NEEDLE U-100 1/2 ML 30 X 5/16" SYRG           Start Date: 12/17/2015End Date: --    LANCETS (MICROLET LANCET) MISC    Inject 1 each into the skin 3 (three) times daily.       Start Date: 11/6/2014 End Date: --    LIDOCAINE (LIDODERM) 5 %    Place 1 patch onto the skin once daily. Remove & Discard patch within 12 hours or as directed by MD       Start Date: 11/11/2020End Date: --    METFORMIN (GLUCOPHAGE-XR) 500 MG ER 24HR TABLET    Take 2 tablets (1,000 mg total) by mouth once daily.       Start Date: 10/23/2020End Date: --    METOPROLOL SUCCINATE (TOPROL-XL) 25 MG 24 HR TABLET    Take 1 tablet (25 mg total) by mouth once daily.       Start Date: 12/24/2019End Date: 12/23/2020    MULTIVITAMIN (MULTIVITAMIN) PER TABLET    Take 1 tablet by mouth once daily.         Start Date: 9/7/2012  End Date: --    NITROGLYCERIN (NITROSTAT) 0.4 MG SL TABLET    Place 1 tablet (0.4 mg total) under the tongue every 5 (five) minutes as needed for Chest " "pain.       Start Date: 12/24/2019End Date: --    PEN NEEDLE, DIABETIC (PEN NEEDLE) 31 GAUGE X 5/16" NDLE    USE ONE  SUBCUTANEOUSLY 4 TIMES DAILY       Start Date: 5/1/2017  End Date: --    TRIAMCINOLONE (KENALOG) 0.5 % OINTMENT    Apply topically 2 (two) times daily.       Start Date: 8/8/2020  End Date: --    VALSARTAN (DIOVAN) 320 MG TABLET    Take 1 tablet (320 mg total) by mouth once daily.       Start Date: 8/8/2020  End Date: --    VITAMIN E 400 UNIT CAPSULE    Take 400 Units by mouth once daily.         Start Date: 9/7/2012  End Date: --    ZOLPIDEM (AMBIEN) 5 MG TAB    Take 1 tablet (5 mg total) by mouth every evening.       Start Date: 11/6/2020 End Date: --       Order(s) placed per written order guidelines:     Please advise.              "

## 2020-12-04 RX ORDER — INSULIN DEGLUDEC 200 U/ML
INJECTION, SOLUTION SUBCUTANEOUS
Qty: 3 SYRINGE | Refills: 0 | Status: SHIPPED | OUTPATIENT
Start: 2020-12-04 | End: 2021-01-04

## 2020-12-15 ENCOUNTER — TELEPHONE (OUTPATIENT)
Dept: FAMILY MEDICINE | Facility: CLINIC | Age: 60
End: 2020-12-15

## 2020-12-15 NOTE — TELEPHONE ENCOUNTER
----- Message from Perla France sent at 12/15/2020  8:33 AM CST -----  Regarding: self [phone]  .Type: Patient Call Back    Who called: self     What is the request in detail: Requesting to have papers fax over for diabetic shoes and also needs Dr.Lombard to fill them out     Can the clinic reply by [facility]? Call back     Would the patient rather a call back or a response via [facility]? Call back     Best call back number: [phone]

## 2020-12-15 NOTE — TELEPHONE ENCOUNTER
Patient given fax number. Patient states that the forms are in her work locker and will fax them over today.

## 2020-12-17 ENCOUNTER — TELEPHONE (OUTPATIENT)
Dept: FAMILY MEDICINE | Facility: CLINIC | Age: 60
End: 2020-12-17

## 2020-12-17 NOTE — TELEPHONE ENCOUNTER
Pt states she faxed over forms for diabetic shoes from podiatrist 2 days ago; she is following up to see if it has been completed

## 2020-12-17 NOTE — TELEPHONE ENCOUNTER
----- Message from Candice Lopez sent at 12/17/2020  9:55 AM CST -----  Regarding: Patient Call Back  Contact: Patient  Type: Patient Call Back    Who called: Patient     What is the request in detail: Pt is requesting a call back in regards to her referral     Can the clinic reply by [facility]?    Would the patient rather a call back or a response via [facility]? Call back            Best call back number: [phone]

## 2020-12-22 ENCOUNTER — PATIENT MESSAGE (OUTPATIENT)
Dept: FAMILY MEDICINE | Facility: CLINIC | Age: 60
End: 2020-12-22

## 2020-12-22 DIAGNOSIS — I10 ESSENTIAL HYPERTENSION: ICD-10-CM

## 2020-12-22 RX ORDER — METOPROLOL SUCCINATE 25 MG/1
25 TABLET, EXTENDED RELEASE ORAL DAILY
Qty: 90 TABLET | Refills: 0 | Status: SHIPPED | OUTPATIENT
Start: 2020-12-22 | End: 2021-03-29

## 2021-01-05 ENCOUNTER — TELEPHONE (OUTPATIENT)
Dept: FAMILY MEDICINE | Facility: CLINIC | Age: 61
End: 2021-01-05

## 2021-01-05 DIAGNOSIS — R05.9 COUGH: Primary | ICD-10-CM

## 2021-01-19 DIAGNOSIS — F51.02 INSOMNIA DUE TO STRESS: ICD-10-CM

## 2021-01-19 DIAGNOSIS — F41.8 DEPRESSION WITH ANXIETY: ICD-10-CM

## 2021-01-24 ENCOUNTER — PATIENT MESSAGE (OUTPATIENT)
Dept: FAMILY MEDICINE | Facility: CLINIC | Age: 61
End: 2021-01-24

## 2021-01-24 DIAGNOSIS — F41.8 DEPRESSION WITH ANXIETY: ICD-10-CM

## 2021-01-24 DIAGNOSIS — F51.02 INSOMNIA DUE TO STRESS: ICD-10-CM

## 2021-01-24 RX ORDER — ZOLPIDEM TARTRATE 5 MG/1
5 TABLET ORAL NIGHTLY
Qty: 30 TABLET | Refills: 1 | Status: CANCELLED | OUTPATIENT
Start: 2021-01-24

## 2021-01-24 RX ORDER — CLONAZEPAM 0.5 MG/1
0.5 TABLET ORAL 2 TIMES DAILY
Qty: 60 TABLET | Refills: 2 | Status: SHIPPED | OUTPATIENT
Start: 2021-01-24

## 2021-01-24 RX ORDER — CLONAZEPAM 0.5 MG/1
0.5 TABLET ORAL 2 TIMES DAILY
Qty: 60 TABLET | Refills: 2 | Status: CANCELLED | OUTPATIENT
Start: 2021-01-24

## 2021-01-24 RX ORDER — ZOLPIDEM TARTRATE 5 MG/1
5 TABLET ORAL NIGHTLY
Qty: 30 TABLET | Refills: 1 | Status: SHIPPED | OUTPATIENT
Start: 2021-01-24

## 2021-02-04 DIAGNOSIS — E11.42 TYPE 2 DIABETES MELLITUS WITH DIABETIC POLYNEUROPATHY, WITH LONG-TERM CURRENT USE OF INSULIN: ICD-10-CM

## 2021-02-04 DIAGNOSIS — Z79.4 TYPE 2 DIABETES MELLITUS WITH DIABETIC POLYNEUROPATHY, WITH LONG-TERM CURRENT USE OF INSULIN: ICD-10-CM

## 2021-02-08 DIAGNOSIS — E11.42 TYPE 2 DIABETES MELLITUS WITH DIABETIC POLYNEUROPATHY, WITH LONG-TERM CURRENT USE OF INSULIN: ICD-10-CM

## 2021-02-08 DIAGNOSIS — Z79.4 TYPE 2 DIABETES MELLITUS WITH DIABETIC POLYNEUROPATHY, WITH LONG-TERM CURRENT USE OF INSULIN: ICD-10-CM

## 2021-02-08 RX ORDER — DULAGLUTIDE 1.5 MG/.5ML
INJECTION, SOLUTION SUBCUTANEOUS
Qty: 10 ML | Refills: 1 | Status: SHIPPED | OUTPATIENT
Start: 2021-02-08 | End: 2021-04-30 | Stop reason: SDUPTHER

## 2021-02-08 RX ORDER — METFORMIN HYDROCHLORIDE 500 MG/1
1000 TABLET, EXTENDED RELEASE ORAL DAILY
Qty: 180 TABLET | Refills: 0 | Status: SHIPPED | OUTPATIENT
Start: 2021-02-08 | End: 2021-05-07 | Stop reason: SDUPTHER

## 2021-02-08 RX ORDER — INSULIN DEGLUDEC 200 U/ML
INJECTION, SOLUTION SUBCUTANEOUS
Qty: 9 ML | Refills: 1 | Status: SHIPPED | OUTPATIENT
Start: 2021-02-08

## 2021-02-08 RX ORDER — METFORMIN HYDROCHLORIDE 500 MG/1
1000 TABLET, EXTENDED RELEASE ORAL DAILY
Qty: 180 TABLET | Refills: 0 | OUTPATIENT
Start: 2021-02-08

## 2021-02-22 DIAGNOSIS — I10 ESSENTIAL HYPERTENSION: ICD-10-CM

## 2021-02-22 DIAGNOSIS — E78.2 MIXED HYPERLIPIDEMIA: ICD-10-CM

## 2021-02-24 ENCOUNTER — TELEPHONE (OUTPATIENT)
Dept: FAMILY MEDICINE | Facility: CLINIC | Age: 61
End: 2021-02-24

## 2021-02-24 DIAGNOSIS — E78.2 MIXED HYPERLIPIDEMIA: ICD-10-CM

## 2021-02-25 RX ORDER — HYDRALAZINE HYDROCHLORIDE 50 MG/1
50 TABLET, FILM COATED ORAL EVERY 8 HOURS
Qty: 270 TABLET | Refills: 1 | Status: SHIPPED | OUTPATIENT
Start: 2021-02-25 | End: 2021-12-27

## 2021-02-25 RX ORDER — ATORVASTATIN CALCIUM 40 MG/1
40 TABLET, FILM COATED ORAL NIGHTLY
Qty: 90 TABLET | Refills: 1 | Status: CANCELLED | OUTPATIENT
Start: 2021-02-25

## 2021-02-25 RX ORDER — ATORVASTATIN CALCIUM 40 MG/1
TABLET, FILM COATED ORAL
Qty: 90 TABLET | Refills: 0 | Status: SHIPPED | OUTPATIENT
Start: 2021-02-25

## 2021-03-12 ENCOUNTER — PATIENT OUTREACH (OUTPATIENT)
Dept: ADMINISTRATIVE | Facility: HOSPITAL | Age: 61
End: 2021-03-12

## 2021-03-21 DIAGNOSIS — I10 ESSENTIAL HYPERTENSION: ICD-10-CM

## 2021-03-29 RX ORDER — METOPROLOL SUCCINATE 25 MG/1
TABLET, EXTENDED RELEASE ORAL
Qty: 90 TABLET | Refills: 0 | Status: SHIPPED | OUTPATIENT
Start: 2021-03-29 | End: 2022-11-08 | Stop reason: ALTCHOICE

## 2021-04-30 DIAGNOSIS — E11.42 TYPE 2 DIABETES MELLITUS WITH DIABETIC POLYNEUROPATHY, WITH LONG-TERM CURRENT USE OF INSULIN: ICD-10-CM

## 2021-04-30 DIAGNOSIS — Z79.4 TYPE 2 DIABETES MELLITUS WITH DIABETIC POLYNEUROPATHY, WITH LONG-TERM CURRENT USE OF INSULIN: ICD-10-CM

## 2021-05-02 RX ORDER — DULAGLUTIDE 1.5 MG/.5ML
1.5 INJECTION, SOLUTION SUBCUTANEOUS WEEKLY
Qty: 12 PEN | Refills: 0 | Status: SHIPPED | OUTPATIENT
Start: 2021-05-02

## 2021-05-07 DIAGNOSIS — Z79.4 TYPE 2 DIABETES MELLITUS WITH DIABETIC POLYNEUROPATHY, WITH LONG-TERM CURRENT USE OF INSULIN: ICD-10-CM

## 2021-05-07 DIAGNOSIS — E11.42 TYPE 2 DIABETES MELLITUS WITH DIABETIC POLYNEUROPATHY, WITH LONG-TERM CURRENT USE OF INSULIN: ICD-10-CM

## 2021-05-07 RX ORDER — METFORMIN HYDROCHLORIDE 500 MG/1
1000 TABLET, EXTENDED RELEASE ORAL DAILY
Qty: 180 TABLET | Refills: 0 | Status: SHIPPED | OUTPATIENT
Start: 2021-05-07 | End: 2021-08-11

## 2021-06-18 DIAGNOSIS — F41.8 DEPRESSION WITH ANXIETY: ICD-10-CM

## 2021-06-21 RX ORDER — CLONAZEPAM 0.5 MG/1
0.5 TABLET ORAL 2 TIMES DAILY
Qty: 60 TABLET | Refills: 0 | OUTPATIENT
Start: 2021-06-21

## 2021-07-02 ENCOUNTER — PATIENT MESSAGE (OUTPATIENT)
Dept: ADMINISTRATIVE | Facility: HOSPITAL | Age: 61
End: 2021-07-02

## 2021-07-16 ENCOUNTER — PATIENT OUTREACH (OUTPATIENT)
Dept: ADMINISTRATIVE | Facility: HOSPITAL | Age: 61
End: 2021-07-16

## 2021-07-16 ENCOUNTER — TELEPHONE (OUTPATIENT)
Dept: ADMINISTRATIVE | Facility: HOSPITAL | Age: 61
End: 2021-07-16

## 2022-02-01 NOTE — TELEPHONE ENCOUNTER
Prior Authorization initiated for Victoza 18mg/3mL.  Awaiting response, 24-72 hours.    Reference # [id number]   Opt out

## 2024-06-13 ENCOUNTER — HOSPITAL ENCOUNTER (EMERGENCY)
Facility: HOSPITAL | Age: 64
Discharge: HOME | End: 2024-06-13
Attending: EMERGENCY MEDICINE
Payer: COMMERCIAL

## 2024-06-13 VITALS
BODY MASS INDEX: 26.52 KG/M2 | HEART RATE: 77 BPM | DIASTOLIC BLOOD PRESSURE: 59 MMHG | OXYGEN SATURATION: 99 % | RESPIRATION RATE: 20 BRPM | HEIGHT: 66 IN | SYSTOLIC BLOOD PRESSURE: 101 MMHG | TEMPERATURE: 98 F | WEIGHT: 165 LBS

## 2024-06-13 DIAGNOSIS — I10 MALIGNANT HYPERTENSION: ICD-10-CM

## 2024-06-13 DIAGNOSIS — R51.9 NONINTRACTABLE HEADACHE, UNSPECIFIED CHRONICITY PATTERN, UNSPECIFIED HEADACHE TYPE: Primary | ICD-10-CM

## 2024-06-13 LAB
ALBUMIN SERPL BCP-MCNC: 3.4 G/DL (ref 3.5–5.2)
ALP SERPL-CCNC: 136 U/L (ref 55–135)
ALT SERPL W/O P-5'-P-CCNC: 21 U/L (ref 10–44)
ANION GAP SERPL CALC-SCNC: 11 MMOL/L (ref 8–16)
AST SERPL-CCNC: 26 U/L (ref 10–40)
BASOPHILS # BLD AUTO: 0.06 K/UL (ref 0–0.2)
BASOPHILS NFR BLD: 0.9 % (ref 0–1.9)
BILIRUB SERPL-MCNC: 1 MG/DL (ref 0.1–1)
BILIRUB UR QL STRIP: NEGATIVE
BNP SERPL-MCNC: 129 PG/ML (ref 0–99)
BUN SERPL-MCNC: 11 MG/DL (ref 8–23)
CALCIUM SERPL-MCNC: 9.7 MG/DL (ref 8.7–10.5)
CHLORIDE SERPL-SCNC: 104 MMOL/L (ref 95–110)
CLARITY UR: CLEAR
CO2 SERPL-SCNC: 20 MMOL/L (ref 23–29)
COLOR UR: COLORLESS
CREAT SERPL-MCNC: 1 MG/DL (ref 0.5–1.4)
DIFFERENTIAL METHOD BLD: NORMAL
EOSINOPHIL # BLD AUTO: 0.2 K/UL (ref 0–0.5)
EOSINOPHIL NFR BLD: 3.2 % (ref 0–8)
ERYTHROCYTE [DISTWIDTH] IN BLOOD BY AUTOMATED COUNT: 13.2 % (ref 11.5–14.5)
EST. GFR  (NO RACE VARIABLE): >60 ML/MIN/1.73 M^2
GLUCOSE SERPL-MCNC: 253 MG/DL (ref 70–110)
GLUCOSE UR QL STRIP: ABNORMAL
HCT VFR BLD AUTO: 43.9 % (ref 37–48.5)
HGB BLD-MCNC: 14.2 G/DL (ref 12–16)
HGB UR QL STRIP: NEGATIVE
IMM GRANULOCYTES # BLD AUTO: 0.01 K/UL (ref 0–0.04)
IMM GRANULOCYTES NFR BLD AUTO: 0.2 % (ref 0–0.5)
KETONES UR QL STRIP: ABNORMAL
LEUKOCYTE ESTERASE UR QL STRIP: NEGATIVE
LYMPHOCYTES # BLD AUTO: 1.3 K/UL (ref 1–4.8)
LYMPHOCYTES NFR BLD: 19.4 % (ref 18–48)
MAGNESIUM SERPL-MCNC: 1.5 MG/DL (ref 1.6–2.6)
MCH RBC QN AUTO: 27.4 PG (ref 27–31)
MCHC RBC AUTO-ENTMCNC: 32.3 G/DL (ref 32–36)
MCV RBC AUTO: 85 FL (ref 82–98)
MONOCYTES # BLD AUTO: 0.4 K/UL (ref 0.3–1)
MONOCYTES NFR BLD: 6.2 % (ref 4–15)
NEUTROPHILS # BLD AUTO: 4.6 K/UL (ref 1.8–7.7)
NEUTROPHILS NFR BLD: 70.1 % (ref 38–73)
NITRITE UR QL STRIP: NEGATIVE
NRBC BLD-RTO: 0 /100 WBC
PH UR STRIP: 6 [PH] (ref 5–8)
PLATELET # BLD AUTO: 229 K/UL (ref 150–450)
PMV BLD AUTO: 11.5 FL (ref 9.2–12.9)
POTASSIUM SERPL-SCNC: 3.8 MMOL/L (ref 3.5–5.1)
PROT SERPL-MCNC: 7.4 G/DL (ref 6–8.4)
PROT UR QL STRIP: NEGATIVE
RBC # BLD AUTO: 5.18 M/UL (ref 4–5.4)
SODIUM SERPL-SCNC: 135 MMOL/L (ref 136–145)
SP GR UR STRIP: <1.005 (ref 1–1.03)
TROPONIN I SERPL DL<=0.01 NG/ML-MCNC: <0.006 NG/ML (ref 0–0.03)
URN SPEC COLLECT METH UR: ABNORMAL
UROBILINOGEN UR STRIP-ACNC: NEGATIVE EU/DL
WBC # BLD AUTO: 6.5 K/UL (ref 3.9–12.7)

## 2024-06-13 PROCEDURE — 63600175 PHARM REV CODE 636 W HCPCS: Performed by: EMERGENCY MEDICINE

## 2024-06-13 PROCEDURE — 84484 ASSAY OF TROPONIN QUANT: CPT | Performed by: EMERGENCY MEDICINE

## 2024-06-13 PROCEDURE — 96375 TX/PRO/DX INJ NEW DRUG ADDON: CPT

## 2024-06-13 PROCEDURE — 83735 ASSAY OF MAGNESIUM: CPT | Performed by: EMERGENCY MEDICINE

## 2024-06-13 PROCEDURE — 85025 COMPLETE CBC W/AUTO DIFF WBC: CPT | Performed by: EMERGENCY MEDICINE

## 2024-06-13 PROCEDURE — 93005 ELECTROCARDIOGRAM TRACING: CPT

## 2024-06-13 PROCEDURE — 96361 HYDRATE IV INFUSION ADD-ON: CPT

## 2024-06-13 PROCEDURE — 81003 URINALYSIS AUTO W/O SCOPE: CPT | Performed by: EMERGENCY MEDICINE

## 2024-06-13 PROCEDURE — 96374 THER/PROPH/DIAG INJ IV PUSH: CPT

## 2024-06-13 PROCEDURE — 99285 EMERGENCY DEPT VISIT HI MDM: CPT | Mod: 25

## 2024-06-13 PROCEDURE — 80053 COMPREHEN METABOLIC PANEL: CPT | Performed by: EMERGENCY MEDICINE

## 2024-06-13 PROCEDURE — 83880 ASSAY OF NATRIURETIC PEPTIDE: CPT | Performed by: EMERGENCY MEDICINE

## 2024-06-13 PROCEDURE — 93010 ELECTROCARDIOGRAM REPORT: CPT | Mod: 76,,, | Performed by: INTERNAL MEDICINE

## 2024-06-13 PROCEDURE — 25000003 PHARM REV CODE 250: Performed by: EMERGENCY MEDICINE

## 2024-06-13 RX ORDER — HYDRALAZINE HYDROCHLORIDE 50 MG/1
50 TABLET, FILM COATED ORAL 3 TIMES DAILY PRN
Qty: 90 TABLET | Refills: 0 | Status: SHIPPED | OUTPATIENT
Start: 2024-06-13 | End: 2025-06-13

## 2024-06-13 RX ORDER — METOCLOPRAMIDE HYDROCHLORIDE 5 MG/ML
10 INJECTION INTRAMUSCULAR; INTRAVENOUS
Status: COMPLETED | OUTPATIENT
Start: 2024-06-13 | End: 2024-06-13

## 2024-06-13 RX ORDER — CLONAZEPAM 0.5 MG/1
1 TABLET ORAL
Status: COMPLETED | OUTPATIENT
Start: 2024-06-13 | End: 2024-06-13

## 2024-06-13 RX ORDER — HYDRALAZINE HYDROCHLORIDE 20 MG/ML
5 INJECTION INTRAMUSCULAR; INTRAVENOUS
Status: COMPLETED | OUTPATIENT
Start: 2024-06-13 | End: 2024-06-13

## 2024-06-13 RX ORDER — HYDRALAZINE HYDROCHLORIDE 20 MG/ML
10 INJECTION INTRAMUSCULAR; INTRAVENOUS
Status: DISCONTINUED | OUTPATIENT
Start: 2024-06-13 | End: 2024-06-13

## 2024-06-13 RX ORDER — KETOROLAC TROMETHAMINE 30 MG/ML
10 INJECTION, SOLUTION INTRAMUSCULAR; INTRAVENOUS
Status: COMPLETED | OUTPATIENT
Start: 2024-06-13 | End: 2024-06-13

## 2024-06-13 RX ORDER — DIPHENHYDRAMINE HYDROCHLORIDE 50 MG/ML
12.5 INJECTION INTRAMUSCULAR; INTRAVENOUS
Status: COMPLETED | OUTPATIENT
Start: 2024-06-13 | End: 2024-06-13

## 2024-06-13 RX ADMIN — HYDRALAZINE HYDROCHLORIDE 5 MG: 20 INJECTION INTRAMUSCULAR; INTRAVENOUS at 01:06

## 2024-06-13 RX ADMIN — KETOROLAC TROMETHAMINE 10 MG: 30 INJECTION, SOLUTION INTRAMUSCULAR at 01:06

## 2024-06-13 RX ADMIN — CLONAZEPAM 1 MG: 0.5 TABLET ORAL at 12:06

## 2024-06-13 RX ADMIN — SODIUM CHLORIDE 1000 ML: 9 INJECTION, SOLUTION INTRAVENOUS at 01:06

## 2024-06-13 RX ADMIN — METOCLOPRAMIDE 10 MG: 5 INJECTION, SOLUTION INTRAMUSCULAR; INTRAVENOUS at 01:06

## 2024-06-13 RX ADMIN — DIPHENHYDRAMINE HYDROCHLORIDE 12.5 MG: 50 INJECTION INTRAMUSCULAR; INTRAVENOUS at 01:06

## 2024-06-13 NOTE — ED NOTES
64 yo female PMHx of DM 2, HTN to the ED complaining of hypertension and headache. Pt reports a generalized pressure 10/10 headache. She went to  today after checking her BP at home and was sent here due to pressure being over 200. Pt states she has taken 0.4 of clonidine since 0400 with no relief. Pt denies CP, SOB, N/V/D, weakness, cough, and/or fevers. VSS, AAOx4, NAD. Pt has been placed on all cardiac monitoring, call light in reach, bed in lowest position, side rails raised x 2, and instructed to call staff for assistance whenever needed.

## 2024-06-13 NOTE — Clinical Note
"Cindy Billingsley (Joan) was seen and treated in our emergency department on 6/13/2024.  She may return to work on 06/15/2024.       If you have any questions or concerns, please don't hesitate to call.      Opal Linares RN    "

## 2024-06-16 LAB
OHS QRS DURATION: 74 MS
OHS QRS DURATION: 78 MS
OHS QTC CALCULATION: 455 MS
OHS QTC CALCULATION: 499 MS